# Patient Record
Sex: FEMALE | Race: BLACK OR AFRICAN AMERICAN | Employment: UNEMPLOYED | URBAN - METROPOLITAN AREA
[De-identification: names, ages, dates, MRNs, and addresses within clinical notes are randomized per-mention and may not be internally consistent; named-entity substitution may affect disease eponyms.]

---

## 2017-02-02 RX ORDER — TRAMADOL HYDROCHLORIDE 50 MG/1
50 TABLET ORAL EVERY 6 HOURS PRN
Qty: 30 TABLET | Refills: 0 | Status: SHIPPED | OUTPATIENT
Start: 2017-02-02 | End: 2017-03-29 | Stop reason: SDUPTHER

## 2017-02-24 RX ORDER — ATORVASTATIN CALCIUM 20 MG/1
TABLET, FILM COATED ORAL
Qty: 90 TABLET | Refills: 0 | Status: SHIPPED | OUTPATIENT
Start: 2017-02-24 | End: 2017-03-29 | Stop reason: SDUPTHER

## 2017-03-29 ENCOUNTER — OFFICE VISIT (OUTPATIENT)
Dept: PRIMARY CARE CLINIC | Age: 57
End: 2017-03-29
Payer: MEDICARE

## 2017-03-29 VITALS
DIASTOLIC BLOOD PRESSURE: 96 MMHG | RESPIRATION RATE: 16 BRPM | SYSTOLIC BLOOD PRESSURE: 122 MMHG | HEART RATE: 92 BPM | BODY MASS INDEX: 50.02 KG/M2 | HEIGHT: 64 IN | WEIGHT: 293 LBS

## 2017-03-29 DIAGNOSIS — E66.01 MORBID OBESITY WITH BMI OF 50.0-59.9, ADULT (HCC): ICD-10-CM

## 2017-03-29 DIAGNOSIS — I10 ESSENTIAL HYPERTENSION: Primary | ICD-10-CM

## 2017-03-29 DIAGNOSIS — Z86.73 HISTORY OF CVA (CEREBROVASCULAR ACCIDENT): ICD-10-CM

## 2017-03-29 PROCEDURE — G8419 CALC BMI OUT NRM PARAM NOF/U: HCPCS | Performed by: INTERNAL MEDICINE

## 2017-03-29 PROCEDURE — G8484 FLU IMMUNIZE NO ADMIN: HCPCS | Performed by: INTERNAL MEDICINE

## 2017-03-29 PROCEDURE — 1036F TOBACCO NON-USER: CPT | Performed by: INTERNAL MEDICINE

## 2017-03-29 PROCEDURE — G8427 DOCREV CUR MEDS BY ELIG CLIN: HCPCS | Performed by: INTERNAL MEDICINE

## 2017-03-29 PROCEDURE — 99213 OFFICE O/P EST LOW 20 MIN: CPT | Performed by: INTERNAL MEDICINE

## 2017-03-29 PROCEDURE — 3017F COLORECTAL CA SCREEN DOC REV: CPT | Performed by: INTERNAL MEDICINE

## 2017-03-29 PROCEDURE — 3014F SCREEN MAMMO DOC REV: CPT | Performed by: INTERNAL MEDICINE

## 2017-03-29 RX ORDER — AMLODIPINE BESYLATE 10 MG/1
TABLET ORAL
Qty: 90 TABLET | Refills: 3 | Status: SHIPPED | OUTPATIENT
Start: 2017-03-29 | End: 2018-03-14 | Stop reason: SDUPTHER

## 2017-03-29 RX ORDER — LORATADINE 10 MG/1
TABLET ORAL
Qty: 90 TABLET | Refills: 3 | Status: SHIPPED | OUTPATIENT
Start: 2017-03-29 | End: 2018-03-01 | Stop reason: SDUPTHER

## 2017-03-29 RX ORDER — ACETAMINOPHEN, ASPIRIN AND CAFFEINE 250; 250; 65 MG/1; MG/1; MG/1
1 TABLET, FILM COATED ORAL EVERY 6 HOURS PRN
Qty: 90 TABLET | Refills: 3 | Status: SHIPPED | OUTPATIENT
Start: 2017-03-29

## 2017-03-29 RX ORDER — ATORVASTATIN CALCIUM 20 MG/1
TABLET, FILM COATED ORAL
Qty: 90 TABLET | Refills: 0 | Status: SHIPPED | OUTPATIENT
Start: 2017-03-29 | End: 2017-07-13 | Stop reason: SDUPTHER

## 2017-03-29 RX ORDER — ASPIRIN AND DIPYRIDAMOLE 25; 200 MG/1; MG/1
CAPSULE, EXTENDED RELEASE ORAL
Qty: 180 CAPSULE | Refills: 3 | Status: SHIPPED | OUTPATIENT
Start: 2017-03-29 | End: 2018-06-07 | Stop reason: SDUPTHER

## 2017-03-29 RX ORDER — MELOXICAM 7.5 MG/1
TABLET ORAL
Qty: 90 TABLET | Refills: 3 | Status: SHIPPED | OUTPATIENT
Start: 2017-03-29 | End: 2018-03-14 | Stop reason: SDUPTHER

## 2017-03-29 RX ORDER — TRAMADOL HYDROCHLORIDE 50 MG/1
50 TABLET ORAL EVERY 6 HOURS PRN
Qty: 30 TABLET | Refills: 0 | Status: SHIPPED | OUTPATIENT
Start: 2017-03-29 | End: 2018-03-14 | Stop reason: SDUPTHER

## 2017-03-29 RX ORDER — HYDROCHLOROTHIAZIDE 25 MG/1
TABLET ORAL
Qty: 90 TABLET | Refills: 3 | Status: SHIPPED | OUTPATIENT
Start: 2017-03-29 | End: 2018-03-14 | Stop reason: SDUPTHER

## 2017-03-29 RX ORDER — HYDRALAZINE HYDROCHLORIDE 25 MG/1
TABLET, FILM COATED ORAL
Qty: 180 TABLET | Refills: 3 | Status: SHIPPED | OUTPATIENT
Start: 2017-03-29 | End: 2018-05-02 | Stop reason: SDUPTHER

## 2017-03-29 RX ORDER — LORATADINE 10 MG/1
10 CAPSULE, LIQUID FILLED ORAL DAILY
Qty: 90 CAPSULE | Refills: 3 | Status: SHIPPED | OUTPATIENT
Start: 2017-03-29 | End: 2018-04-20 | Stop reason: SDUPTHER

## 2017-03-29 RX ORDER — MECLIZINE HCL 12.5 MG/1
TABLET ORAL
Qty: 90 TABLET | Refills: 3 | Status: SHIPPED | OUTPATIENT
Start: 2017-03-29 | End: 2018-06-07 | Stop reason: SDUPTHER

## 2017-03-31 ENCOUNTER — CLINICAL DOCUMENTATION (OUTPATIENT)
Dept: PRIMARY CARE CLINIC | Age: 57
End: 2017-03-31

## 2017-04-03 ASSESSMENT — ENCOUNTER SYMPTOMS
SHORTNESS OF BREATH: 0
CHEST TIGHTNESS: 0
ABDOMINAL PAIN: 0

## 2017-07-13 RX ORDER — ATORVASTATIN CALCIUM 20 MG/1
TABLET, FILM COATED ORAL
Qty: 90 TABLET | Refills: 0 | Status: SHIPPED | OUTPATIENT
Start: 2017-07-13 | End: 2017-12-15 | Stop reason: SDUPTHER

## 2017-12-15 RX ORDER — ATORVASTATIN CALCIUM 20 MG/1
TABLET, FILM COATED ORAL
Qty: 90 TABLET | Refills: 0 | Status: SHIPPED | OUTPATIENT
Start: 2017-12-15 | End: 2018-03-14 | Stop reason: SDUPTHER

## 2018-03-01 ENCOUNTER — OFFICE VISIT (OUTPATIENT)
Dept: PRIMARY CARE CLINIC | Age: 58
End: 2018-03-01
Payer: MEDICARE

## 2018-03-01 VITALS
WEIGHT: 293 LBS | TEMPERATURE: 98.7 F | OXYGEN SATURATION: 98 % | BODY MASS INDEX: 50.02 KG/M2 | DIASTOLIC BLOOD PRESSURE: 84 MMHG | HEIGHT: 64 IN | SYSTOLIC BLOOD PRESSURE: 130 MMHG | HEART RATE: 106 BPM

## 2018-03-01 DIAGNOSIS — M17.10 ARTHRITIS OF KNEE: Primary | ICD-10-CM

## 2018-03-01 DIAGNOSIS — M17.0 PRIMARY OSTEOARTHRITIS OF BOTH KNEES: ICD-10-CM

## 2018-03-01 DIAGNOSIS — I10 ESSENTIAL HYPERTENSION: ICD-10-CM

## 2018-03-01 DIAGNOSIS — Z86.73 HISTORY OF CVA (CEREBROVASCULAR ACCIDENT): ICD-10-CM

## 2018-03-01 DIAGNOSIS — E66.01 MORBID OBESITY WITH BMI OF 50.0-59.9, ADULT (HCC): ICD-10-CM

## 2018-03-01 PROCEDURE — G8484 FLU IMMUNIZE NO ADMIN: HCPCS | Performed by: INTERNAL MEDICINE

## 2018-03-01 PROCEDURE — 1036F TOBACCO NON-USER: CPT | Performed by: INTERNAL MEDICINE

## 2018-03-01 PROCEDURE — 3014F SCREEN MAMMO DOC REV: CPT | Performed by: INTERNAL MEDICINE

## 2018-03-01 PROCEDURE — 99214 OFFICE O/P EST MOD 30 MIN: CPT | Performed by: INTERNAL MEDICINE

## 2018-03-01 PROCEDURE — G8427 DOCREV CUR MEDS BY ELIG CLIN: HCPCS | Performed by: INTERNAL MEDICINE

## 2018-03-01 PROCEDURE — G8417 CALC BMI ABV UP PARAM F/U: HCPCS | Performed by: INTERNAL MEDICINE

## 2018-03-01 PROCEDURE — 3017F COLORECTAL CA SCREEN DOC REV: CPT | Performed by: INTERNAL MEDICINE

## 2018-03-01 ASSESSMENT — PATIENT HEALTH QUESTIONNAIRE - PHQ9
1. LITTLE INTEREST OR PLEASURE IN DOING THINGS: 1
2. FEELING DOWN, DEPRESSED OR HOPELESS: 1
SUM OF ALL RESPONSES TO PHQ QUESTIONS 1-9: 2
SUM OF ALL RESPONSES TO PHQ9 QUESTIONS 1 & 2: 2

## 2018-03-01 NOTE — Clinical Note
Referral to pain management clinic at [facility] [place] [address] [facility] care [facility]. For bilateral knee injections. Patient's BMI is over 50 so you know.

## 2018-03-01 NOTE — PROGRESS NOTES
[address]  [address]  [place] [address]  Dept: [phone]  Dept Fax: [phone]    Dr. Lexie Villegas    Progress Note    Date of patient's visit: 3/1/2018  YOB: 1960  Patient's Name:  Juan Manuel Bundy    Patient Care Team:  Arsen Ervin MD as PCP - General (Internal Medicine)  Beatriz Westbrook DO as PCP - Internal Medicine (Internal Medicine)  Beatriz Westbrook DO as PCP - [facility] Attributed Provider    REASON FOR VISIT: Patient is here to discuss the following:  Hypertension, joint pains, knee pain, left foot pain. Stroke in 2006with weakness of right arm and right leg. Speech was not affected. medications, laboratory values and test results    HISTORY OF PRESENT ILLNESS:    History was obtained from the patient. Juan Manuel Bundy is a 61 y.o. is here for a      Patient Active Problem List   Diagnosis    Syncope    Osteoarthritis    History of CVA (cerebrovascular accident)    Essential hypertension       Subjective: Juan Manuel Bundy is a 61 y.o. female who presents with knee swelling involving and knee pain; both knees. Onset was gradual, starting about several months ago. Inciting event: this is a longstanding problem which has been getting worse. Current symptoms include: crepitus sensation, giving out, locking, stiffness and swelling. Pain is aggravated by any weight bearing, going up and down stairs, inactivity, lateral movements, pivoting, rising after sitting, standing and walking. Patient has had prior knee problems. Evaluation to date: plain films: abnormal with severe arthritic changes. . Treatment to date: avoidance of offending activity and OTC analgesics which are ineffective. Patient's medications, allergies, past medical, surgical, social and family histories were reviewed and updated as appropriate.                ALLERGIES      Allergies   Allergen Reactions    Ace Inhibitors     Pcn [Penicillins] voiced      understanding. 4.  Reviewed prior labs and health maintenance  5. Continue current medications, diet and exercise. Form  for [facility] non physician information needs filled out by office staff. Return in 4  Months with Lauro Michelle M.D., for Re-evaluation and further plan of treatment.      Ortega Conklin M.D.      3/1/2018, 9:59 AM

## 2018-03-05 ENCOUNTER — TELEPHONE (OUTPATIENT)
Dept: PAIN MANAGEMENT | Age: 58
End: 2018-03-05

## 2018-03-12 ENCOUNTER — TELEPHONE (OUTPATIENT)
Dept: PAIN MANAGEMENT | Age: 58
End: 2018-03-12

## 2018-03-14 DIAGNOSIS — M17.10 ARTHRITIS OF KNEE: Primary | ICD-10-CM

## 2018-03-14 RX ORDER — AMLODIPINE BESYLATE 10 MG/1
TABLET ORAL
Qty: 90 TABLET | Refills: 3 | Status: SHIPPED | OUTPATIENT
Start: 2018-03-14 | End: 2018-06-07 | Stop reason: SDUPTHER

## 2018-03-14 RX ORDER — MELOXICAM 7.5 MG/1
TABLET ORAL
Qty: 90 TABLET | Refills: 3 | Status: SHIPPED | OUTPATIENT
Start: 2018-03-14 | End: 2018-04-20 | Stop reason: SDUPTHER

## 2018-03-14 RX ORDER — TRAMADOL HYDROCHLORIDE 50 MG/1
50 TABLET ORAL EVERY 6 HOURS PRN
Qty: 30 TABLET | Refills: 0 | Status: SHIPPED | OUTPATIENT
Start: 2018-03-14 | End: 2018-04-13 | Stop reason: SDUPTHER

## 2018-03-14 RX ORDER — HYDROCHLOROTHIAZIDE 25 MG/1
TABLET ORAL
Qty: 90 TABLET | Refills: 3 | Status: SHIPPED | OUTPATIENT
Start: 2018-03-14 | End: 2018-04-20 | Stop reason: SDUPTHER

## 2018-03-14 RX ORDER — ATORVASTATIN CALCIUM 20 MG/1
TABLET, FILM COATED ORAL
Qty: 90 TABLET | Refills: 0 | Status: SHIPPED | OUTPATIENT
Start: 2018-03-14 | End: 2018-06-07 | Stop reason: SDUPTHER

## 2018-03-16 ENCOUNTER — TELEPHONE (OUTPATIENT)
Dept: PRIMARY CARE CLINIC | Age: 58
End: 2018-03-16

## 2018-03-20 ENCOUNTER — HOSPITAL ENCOUNTER (OUTPATIENT)
Dept: GENERAL RADIOLOGY | Age: 58
Discharge: HOME | End: 2018-03-22
Payer: MEDICARE

## 2018-03-20 ENCOUNTER — HOSPITAL ENCOUNTER (OUTPATIENT)
Dept: PAIN MANAGEMENT | Age: 58
Discharge: HOME | End: 2018-03-20
Payer: MEDICARE

## 2018-03-20 VITALS
DIASTOLIC BLOOD PRESSURE: 76 MMHG | BODY MASS INDEX: 50.02 KG/M2 | HEIGHT: 64 IN | SYSTOLIC BLOOD PRESSURE: 157 MMHG | HEART RATE: 88 BPM | TEMPERATURE: 98.2 F | RESPIRATION RATE: 16 BRPM | OXYGEN SATURATION: 98 % | WEIGHT: 293 LBS

## 2018-03-20 DIAGNOSIS — M17.0 PRIMARY OSTEOARTHRITIS OF BOTH KNEES: Primary | ICD-10-CM

## 2018-03-20 DIAGNOSIS — M17.0 PRIMARY OSTEOARTHRITIS OF BOTH KNEES: ICD-10-CM

## 2018-03-20 PROCEDURE — 77002 NEEDLE LOCALIZATION BY XRAY: CPT

## 2018-03-20 PROCEDURE — 3209999900 FLUORO FOR SURGICAL PROCEDURES

## 2018-03-20 PROCEDURE — 6360000002 HC RX W HCPCS

## 2018-03-20 PROCEDURE — 20610 DRAIN/INJ JOINT/BURSA W/O US: CPT | Performed by: PAIN MEDICINE

## 2018-03-20 PROCEDURE — 20610 DRAIN/INJ JOINT/BURSA W/O US: CPT

## 2018-03-20 ASSESSMENT — ACTIVITIES OF DAILY LIVING (ADL): EFFECT OF PAIN ON DAILY ACTIVITIES: PAIN INCREASES WITH STANDING

## 2018-03-20 ASSESSMENT — PAIN DESCRIPTION - ONSET: ONSET: ON-GOING

## 2018-03-20 ASSESSMENT — PAIN - FUNCTIONAL ASSESSMENT: PAIN_FUNCTIONAL_ASSESSMENT: 0-10

## 2018-03-20 ASSESSMENT — PAIN DESCRIPTION - PROGRESSION: CLINICAL_PROGRESSION: NOT CHANGED

## 2018-03-20 ASSESSMENT — PAIN DESCRIPTION - ORIENTATION: ORIENTATION: RIGHT;LEFT

## 2018-03-20 ASSESSMENT — PAIN DESCRIPTION - PAIN TYPE: TYPE: CHRONIC PAIN

## 2018-03-20 ASSESSMENT — PAIN SCALES - GENERAL: PAINLEVEL_OUTOF10: 5

## 2018-03-20 ASSESSMENT — PAIN DESCRIPTION - FREQUENCY: FREQUENCY: INTERMITTENT

## 2018-03-20 ASSESSMENT — PAIN DESCRIPTION - LOCATION: LOCATION: KNEE

## 2018-03-20 ASSESSMENT — PAIN DESCRIPTION - DESCRIPTORS: DESCRIPTORS: ACHING

## 2018-03-20 NOTE — PROGRESS NOTES
Discharge criteria met. Post procedure dressing dry and intact. Sensory and motor function intact as per pre-procedure. Patient alert and oriented x3  Instructions and follow up reviewed with pt at patient at discharge. Discharged home transported by wheelchair, accompanied by family .   Discharged @237

## 2018-04-04 ENCOUNTER — HOSPITAL ENCOUNTER (OUTPATIENT)
Dept: PAIN MANAGEMENT | Age: 58
Discharge: HOME | End: 2018-04-04
Payer: MEDICARE

## 2018-04-04 VITALS
OXYGEN SATURATION: 98 % | HEIGHT: 64 IN | TEMPERATURE: 98.6 F | SYSTOLIC BLOOD PRESSURE: 139 MMHG | HEART RATE: 98 BPM | BODY MASS INDEX: 50.02 KG/M2 | DIASTOLIC BLOOD PRESSURE: 85 MMHG | WEIGHT: 293 LBS | RESPIRATION RATE: 18 BRPM

## 2018-04-04 DIAGNOSIS — Z86.73 HISTORY OF CVA (CEREBROVASCULAR ACCIDENT): ICD-10-CM

## 2018-04-04 DIAGNOSIS — M17.0 PRIMARY OSTEOARTHRITIS OF BOTH KNEES: Primary | ICD-10-CM

## 2018-04-04 PROCEDURE — 99213 OFFICE O/P EST LOW 20 MIN: CPT

## 2018-04-04 PROCEDURE — 99214 OFFICE O/P EST MOD 30 MIN: CPT | Performed by: PAIN MEDICINE

## 2018-04-04 ASSESSMENT — ENCOUNTER SYMPTOMS
DIARRHEA: 0
WHEEZING: 0
ABDOMINAL PAIN: 0
DOUBLE VISION: 0
CONSTIPATION: 0
SPUTUM PRODUCTION: 0
BACK PAIN: 0
EYE REDNESS: 0
SINUS PAIN: 0
SHORTNESS OF BREATH: 0
NAUSEA: 1
SORE THROAT: 0
EYE PAIN: 0
PHOTOPHOBIA: 0
STRIDOR: 0
HEARTBURN: 0
EYE DISCHARGE: 0
COUGH: 0
BLURRED VISION: 0
VOMITING: 0
HEMOPTYSIS: 0
RESPIRATORY NEGATIVE: 1
BLOOD IN STOOL: 0
ORTHOPNEA: 0
EYES NEGATIVE: 1

## 2018-04-04 ASSESSMENT — PAIN DESCRIPTION - ORIENTATION: ORIENTATION: RIGHT;LEFT

## 2018-04-04 ASSESSMENT — PAIN DESCRIPTION - DESCRIPTORS: DESCRIPTORS: ACHING

## 2018-04-04 ASSESSMENT — PAIN DESCRIPTION - LOCATION: LOCATION: KNEE

## 2018-04-04 ASSESSMENT — PAIN DESCRIPTION - FREQUENCY: FREQUENCY: INTERMITTENT

## 2018-04-04 ASSESSMENT — PAIN DESCRIPTION - ONSET: ONSET: ON-GOING

## 2018-04-04 ASSESSMENT — PAIN SCALES - GENERAL: PAINLEVEL_OUTOF10: 7

## 2018-04-04 ASSESSMENT — PAIN DESCRIPTION - PAIN TYPE: TYPE: CHRONIC PAIN

## 2018-04-10 DIAGNOSIS — M51.36 DDD (DEGENERATIVE DISC DISEASE), LUMBAR: Primary | ICD-10-CM

## 2018-04-10 NOTE — TELEPHONE ENCOUNTER
Handicapped placard done again. It is there that it is for five years.      Electronically signed by Simone Hodgkin, MD on 4/10/2018 at 4:35 PM

## 2018-04-13 DIAGNOSIS — M17.10 ARTHRITIS OF KNEE: ICD-10-CM

## 2018-04-13 RX ORDER — TRAMADOL HYDROCHLORIDE 50 MG/1
50 TABLET ORAL EVERY 6 HOURS PRN
Qty: 30 TABLET | Refills: 0 | Status: SHIPPED | OUTPATIENT
Start: 2018-04-13 | End: 2018-04-24 | Stop reason: SDUPTHER

## 2018-04-16 DIAGNOSIS — M17.10 ARTHRITIS OF KNEE: ICD-10-CM

## 2018-04-16 RX ORDER — TRAMADOL HYDROCHLORIDE 50 MG/1
TABLET ORAL
Qty: 30 TABLET | Refills: 0 | OUTPATIENT
Start: 2018-04-16

## 2018-04-17 ENCOUNTER — HOSPITAL ENCOUNTER (OUTPATIENT)
Dept: PAIN MANAGEMENT | Age: 58
Discharge: HOME | End: 2018-04-17
Payer: MEDICARE

## 2018-04-17 ENCOUNTER — HOSPITAL ENCOUNTER (OUTPATIENT)
Dept: GENERAL RADIOLOGY | Age: 58
Discharge: HOME | End: 2018-04-19
Payer: MEDICARE

## 2018-04-17 VITALS
DIASTOLIC BLOOD PRESSURE: 90 MMHG | TEMPERATURE: 98.6 F | HEART RATE: 99 BPM | OXYGEN SATURATION: 97 % | WEIGHT: 293 LBS | SYSTOLIC BLOOD PRESSURE: 160 MMHG | BODY MASS INDEX: 50.02 KG/M2 | RESPIRATION RATE: 16 BRPM | HEIGHT: 64 IN

## 2018-04-17 DIAGNOSIS — M17.0 PRIMARY OSTEOARTHRITIS OF BOTH KNEES: ICD-10-CM

## 2018-04-17 PROCEDURE — 2500000003 HC RX 250 WO HCPCS

## 2018-04-17 PROCEDURE — 20610 DRAIN/INJ JOINT/BURSA W/O US: CPT

## 2018-04-17 PROCEDURE — 77002 NEEDLE LOCALIZATION BY XRAY: CPT

## 2018-04-17 PROCEDURE — 20610 DRAIN/INJ JOINT/BURSA W/O US: CPT | Performed by: ANESTHESIOLOGY

## 2018-04-17 PROCEDURE — 3209999900 FLUORO FOR SURGICAL PROCEDURES

## 2018-04-17 PROCEDURE — 6360000002 HC RX W HCPCS

## 2018-04-17 ASSESSMENT — PAIN DESCRIPTION - ONSET: ONSET: ON-GOING

## 2018-04-17 ASSESSMENT — PAIN DESCRIPTION - LOCATION: LOCATION: KNEE

## 2018-04-17 ASSESSMENT — PAIN DESCRIPTION - FREQUENCY: FREQUENCY: INTERMITTENT

## 2018-04-17 ASSESSMENT — PAIN DESCRIPTION - DESCRIPTORS: DESCRIPTORS: ACHING

## 2018-04-17 ASSESSMENT — PAIN SCALES - GENERAL: PAINLEVEL_OUTOF10: 7

## 2018-04-17 ASSESSMENT — PAIN - FUNCTIONAL ASSESSMENT: PAIN_FUNCTIONAL_ASSESSMENT: 0-10

## 2018-04-17 ASSESSMENT — PAIN DESCRIPTION - PAIN TYPE: TYPE: CHRONIC PAIN

## 2018-04-17 ASSESSMENT — PAIN DESCRIPTION - ORIENTATION: ORIENTATION: RIGHT;LEFT

## 2018-04-23 RX ORDER — LORATADINE 10 MG/1
10 CAPSULE, LIQUID FILLED ORAL DAILY
Qty: 90 CAPSULE | Refills: 3 | Status: SHIPPED | OUTPATIENT
Start: 2018-04-23 | End: 2018-06-07 | Stop reason: SDUPTHER

## 2018-04-23 RX ORDER — HYDROCHLOROTHIAZIDE 25 MG/1
TABLET ORAL
Qty: 90 TABLET | Refills: 3 | Status: SHIPPED | OUTPATIENT
Start: 2018-04-23 | End: 2018-06-07 | Stop reason: SDUPTHER

## 2018-04-23 RX ORDER — MELOXICAM 7.5 MG/1
TABLET ORAL
Qty: 90 TABLET | Refills: 3 | Status: SHIPPED | OUTPATIENT
Start: 2018-04-23 | End: 2018-06-07 | Stop reason: SDUPTHER

## 2018-04-24 RX ORDER — TRAMADOL HYDROCHLORIDE 50 MG/1
50 TABLET ORAL 3 TIMES DAILY PRN
Qty: 90 TABLET | Refills: 0 | Status: SHIPPED | OUTPATIENT
Start: 2018-04-24 | End: 2018-06-07 | Stop reason: SDUPTHER

## 2018-05-02 RX ORDER — HYDRALAZINE HYDROCHLORIDE 25 MG/1
TABLET, FILM COATED ORAL
Qty: 180 TABLET | Refills: 3 | Status: SHIPPED | OUTPATIENT
Start: 2018-05-02 | End: 2018-06-07 | Stop reason: SDUPTHER

## 2018-06-07 ENCOUNTER — OFFICE VISIT (OUTPATIENT)
Dept: PRIMARY CARE CLINIC | Age: 58
End: 2018-06-07
Payer: MEDICARE

## 2018-06-07 VITALS
WEIGHT: 293 LBS | OXYGEN SATURATION: 96 % | BODY MASS INDEX: 50.02 KG/M2 | DIASTOLIC BLOOD PRESSURE: 83 MMHG | HEIGHT: 64 IN | SYSTOLIC BLOOD PRESSURE: 137 MMHG | HEART RATE: 100 BPM

## 2018-06-07 DIAGNOSIS — Z86.73 HISTORY OF CVA (CEREBROVASCULAR ACCIDENT): ICD-10-CM

## 2018-06-07 DIAGNOSIS — J30.9 CHRONIC ALLERGIC RHINITIS, UNSPECIFIED SEASONALITY, UNSPECIFIED TRIGGER: ICD-10-CM

## 2018-06-07 DIAGNOSIS — R42 VERTIGO: ICD-10-CM

## 2018-06-07 DIAGNOSIS — Z12.39 SCREENING FOR BREAST CANCER: ICD-10-CM

## 2018-06-07 DIAGNOSIS — I10 ESSENTIAL HYPERTENSION: ICD-10-CM

## 2018-06-07 DIAGNOSIS — M17.10 ARTHRITIS OF KNEE: Primary | ICD-10-CM

## 2018-06-07 DIAGNOSIS — Z00.00 HEALTHCARE MAINTENANCE: ICD-10-CM

## 2018-06-07 PROCEDURE — 1036F TOBACCO NON-USER: CPT | Performed by: NURSE PRACTITIONER

## 2018-06-07 PROCEDURE — G8417 CALC BMI ABV UP PARAM F/U: HCPCS | Performed by: NURSE PRACTITIONER

## 2018-06-07 PROCEDURE — 99214 OFFICE O/P EST MOD 30 MIN: CPT | Performed by: NURSE PRACTITIONER

## 2018-06-07 PROCEDURE — G8427 DOCREV CUR MEDS BY ELIG CLIN: HCPCS | Performed by: NURSE PRACTITIONER

## 2018-06-07 PROCEDURE — 3017F COLORECTAL CA SCREEN DOC REV: CPT | Performed by: NURSE PRACTITIONER

## 2018-06-07 RX ORDER — HYDRALAZINE HYDROCHLORIDE 25 MG/1
TABLET, FILM COATED ORAL
Qty: 180 TABLET | Refills: 1 | Status: SHIPPED | OUTPATIENT
Start: 2018-06-07 | End: 2019-07-18 | Stop reason: SDUPTHER

## 2018-06-07 RX ORDER — ATORVASTATIN CALCIUM 20 MG/1
TABLET, FILM COATED ORAL
Qty: 90 TABLET | Refills: 1 | Status: SHIPPED | OUTPATIENT
Start: 2018-06-07 | End: 2018-12-07 | Stop reason: SDUPTHER

## 2018-06-07 RX ORDER — MELOXICAM 7.5 MG/1
TABLET ORAL
Qty: 90 TABLET | Refills: 1 | Status: SHIPPED | OUTPATIENT
Start: 2018-06-07 | End: 2018-12-09 | Stop reason: SDUPTHER

## 2018-06-07 RX ORDER — MECLIZINE HCL 12.5 MG/1
TABLET ORAL
Qty: 90 TABLET | Refills: 1 | Status: SHIPPED | OUTPATIENT
Start: 2018-06-07 | End: 2019-07-18 | Stop reason: SDUPTHER

## 2018-06-07 RX ORDER — TRAMADOL HYDROCHLORIDE 50 MG/1
50 TABLET ORAL 3 TIMES DAILY PRN
Qty: 90 TABLET | Refills: 0 | Status: SHIPPED | OUTPATIENT
Start: 2018-06-07 | End: 2018-08-22 | Stop reason: SDUPTHER

## 2018-06-07 RX ORDER — HYDROCHLOROTHIAZIDE 25 MG/1
TABLET ORAL
Qty: 90 TABLET | Refills: 3 | Status: SHIPPED | OUTPATIENT
Start: 2018-06-07 | End: 2019-07-18 | Stop reason: SDUPTHER

## 2018-06-07 RX ORDER — ASPIRIN AND DIPYRIDAMOLE 25; 200 MG/1; MG/1
CAPSULE, EXTENDED RELEASE ORAL
Qty: 180 CAPSULE | Refills: 1 | Status: SHIPPED | OUTPATIENT
Start: 2018-06-07 | End: 2018-12-07 | Stop reason: SDUPTHER

## 2018-06-07 RX ORDER — AMLODIPINE BESYLATE 10 MG/1
TABLET ORAL
Qty: 90 TABLET | Refills: 1 | Status: SHIPPED | OUTPATIENT
Start: 2018-06-07 | End: 2019-01-19 | Stop reason: SDUPTHER

## 2018-06-07 RX ORDER — LORATADINE 10 MG/1
10 CAPSULE, LIQUID FILLED ORAL DAILY
Qty: 90 CAPSULE | Refills: 1 | Status: SHIPPED | OUTPATIENT
Start: 2018-06-07 | End: 2019-07-18 | Stop reason: SDUPTHER

## 2018-06-07 ASSESSMENT — ENCOUNTER SYMPTOMS
CONSTIPATION: 0
VOMITING: 0
COUGH: 0
CHEST TIGHTNESS: 0
NAUSEA: 0
DIARRHEA: 0
SHORTNESS OF BREATH: 0

## 2018-08-22 DIAGNOSIS — M17.10 ARTHRITIS OF KNEE: ICD-10-CM

## 2018-08-22 RX ORDER — TRAMADOL HYDROCHLORIDE 50 MG/1
50 TABLET ORAL 3 TIMES DAILY PRN
Qty: 90 TABLET | Refills: 0 | Status: SHIPPED | OUTPATIENT
Start: 2018-08-22 | End: 2018-12-09 | Stop reason: SDUPTHER

## 2018-12-07 DIAGNOSIS — Z86.73 HISTORY OF CVA (CEREBROVASCULAR ACCIDENT): ICD-10-CM

## 2018-12-07 RX ORDER — ASPIRIN/DIPYRIDAMOLE 25MG-200MG
CAPSULE,EXTENDED RELEASE MULTIPHASE 12HR ORAL
Qty: 180 CAPSULE | Refills: 1 | Status: SHIPPED | OUTPATIENT
Start: 2018-12-07 | End: 2019-07-18 | Stop reason: SDUPTHER

## 2018-12-07 RX ORDER — ATORVASTATIN CALCIUM 20 MG/1
TABLET, FILM COATED ORAL
Qty: 90 TABLET | Refills: 1 | Status: SHIPPED | OUTPATIENT
Start: 2018-12-07 | End: 2019-06-06 | Stop reason: SDUPTHER

## 2018-12-09 DIAGNOSIS — M17.10 ARTHRITIS OF KNEE: ICD-10-CM

## 2018-12-10 RX ORDER — MELOXICAM 7.5 MG/1
TABLET ORAL
Qty: 90 TABLET | Refills: 0 | Status: SHIPPED | OUTPATIENT
Start: 2018-12-10 | End: 2019-07-18 | Stop reason: SDUPTHER

## 2018-12-10 RX ORDER — TRAMADOL HYDROCHLORIDE 50 MG/1
50 TABLET ORAL 3 TIMES DAILY PRN
Qty: 90 TABLET | Refills: 0 | Status: SHIPPED | OUTPATIENT
Start: 2018-12-10 | End: 2019-07-18 | Stop reason: SDUPTHER

## 2019-01-19 DIAGNOSIS — I10 ESSENTIAL HYPERTENSION: ICD-10-CM

## 2019-01-21 RX ORDER — AMLODIPINE BESYLATE 10 MG/1
TABLET ORAL
Qty: 90 TABLET | Refills: 1 | Status: SHIPPED | OUTPATIENT
Start: 2019-01-21 | End: 2019-07-18 | Stop reason: SDUPTHER

## 2019-06-06 DIAGNOSIS — Z86.73 HISTORY OF CVA (CEREBROVASCULAR ACCIDENT): ICD-10-CM

## 2019-06-06 RX ORDER — ATORVASTATIN CALCIUM 20 MG/1
TABLET, FILM COATED ORAL
Qty: 30 TABLET | Refills: 0 | Status: SHIPPED | OUTPATIENT
Start: 2019-06-06 | End: 2019-07-07 | Stop reason: SDUPTHER

## 2019-06-06 NOTE — TELEPHONE ENCOUNTER
Last OV 06/07/2018    Health Maintenance   Topic Date Due    Creatinine monitoring  1960    DTaP/Tdap/Td vaccine (1 - Tdap) 09/02/1978    Diabetes screen  09/02/1999    Shingles Vaccine (1 of 2) 09/02/2009    Breast cancer screen  09/01/2015    Potassium monitoring  03/24/2016    Cervical cancer screen  09/01/2017    Flu vaccine (Season Ended) 09/01/2019    Lipid screen  06/06/2021    Colon cancer screen colonoscopy  09/01/2022    Hepatitis C screen  Addressed    HIV screen  Addressed    Pneumococcal 0-64 years Vaccine  Aged Out             (applicable per patient's age: Cancer Screenings, Depression Screening, Fall Risk Screening, Immunizations)    LDL Cholesterol (mg/dL)   Date Value   06/06/2016 72     AST (U/L)   Date Value   03/24/2015 15     ALT (U/L)   Date Value   03/24/2015 9     BUN (mg/dL)   Date Value   03/24/2015 18      (goal A1C is < 7)   (goal LDL is <100) need 30-50% reduction from baseline     BP Readings from Last 3 Encounters:   06/07/18 137/83   04/17/18 (!) 160/90   04/04/18 139/85    (goal /80)      All Future Testing planned in [facility]:  Lab Frequency Next Occurrence   HEMA DIGITAL SCREEN W OR WO CAD BILATERAL Once 06/07/2018       Next Visit Date:  No future appointments.          Patient Active Problem List:     Syncope     Osteoarthritis     History of CVA (cerebrovascular accident)     Essential hypertension     Vertigo     Chronic allergic rhinitis

## 2019-07-07 DIAGNOSIS — Z86.73 HISTORY OF CVA (CEREBROVASCULAR ACCIDENT): ICD-10-CM

## 2019-07-08 RX ORDER — ATORVASTATIN CALCIUM 20 MG/1
TABLET, FILM COATED ORAL
Qty: 30 TABLET | Refills: 0 | Status: SHIPPED | OUTPATIENT
Start: 2019-07-08 | End: 2019-07-18 | Stop reason: SDUPTHER

## 2019-07-18 ENCOUNTER — OFFICE VISIT (OUTPATIENT)
Dept: PRIMARY CARE CLINIC | Age: 59
End: 2019-07-18
Payer: MEDICARE

## 2019-07-18 VITALS
HEART RATE: 100 BPM | DIASTOLIC BLOOD PRESSURE: 72 MMHG | RESPIRATION RATE: 18 BRPM | BODY MASS INDEX: 53.92 KG/M2 | WEIGHT: 293 LBS | SYSTOLIC BLOOD PRESSURE: 118 MMHG | HEIGHT: 62 IN

## 2019-07-18 DIAGNOSIS — J30.9 CHRONIC ALLERGIC RHINITIS: ICD-10-CM

## 2019-07-18 DIAGNOSIS — R42 VERTIGO: ICD-10-CM

## 2019-07-18 DIAGNOSIS — E66.01 MORBID OBESITY WITH BMI OF 50.0-59.9, ADULT (HCC): ICD-10-CM

## 2019-07-18 DIAGNOSIS — E78.2 MIXED HYPERLIPIDEMIA: ICD-10-CM

## 2019-07-18 DIAGNOSIS — Z13.29 SCREENING FOR THYROID DISORDER: ICD-10-CM

## 2019-07-18 DIAGNOSIS — M17.0 PRIMARY OSTEOARTHRITIS OF BOTH KNEES: Primary | ICD-10-CM

## 2019-07-18 DIAGNOSIS — I10 ESSENTIAL HYPERTENSION: ICD-10-CM

## 2019-07-18 DIAGNOSIS — Z86.73 HISTORY OF CVA (CEREBROVASCULAR ACCIDENT): ICD-10-CM

## 2019-07-18 DIAGNOSIS — Z12.31 ENCOUNTER FOR SCREENING MAMMOGRAM FOR BREAST CANCER: ICD-10-CM

## 2019-07-18 DIAGNOSIS — Z13.0 SCREENING FOR OTHER AND UNSPECIFIED DEFICIENCY ANEMIA: ICD-10-CM

## 2019-07-18 PROCEDURE — 99214 OFFICE O/P EST MOD 30 MIN: CPT | Performed by: NURSE PRACTITIONER

## 2019-07-18 PROCEDURE — 3017F COLORECTAL CA SCREEN DOC REV: CPT | Performed by: NURSE PRACTITIONER

## 2019-07-18 PROCEDURE — 1036F TOBACCO NON-USER: CPT | Performed by: NURSE PRACTITIONER

## 2019-07-18 PROCEDURE — G8417 CALC BMI ABV UP PARAM F/U: HCPCS | Performed by: NURSE PRACTITIONER

## 2019-07-18 PROCEDURE — G8427 DOCREV CUR MEDS BY ELIG CLIN: HCPCS | Performed by: NURSE PRACTITIONER

## 2019-07-18 RX ORDER — ASPIRIN AND DIPYRIDAMOLE 25; 200 MG/1; MG/1
CAPSULE, EXTENDED RELEASE ORAL
Qty: 180 CAPSULE | Refills: 1 | Status: SHIPPED | OUTPATIENT
Start: 2019-07-18 | End: 2020-04-20

## 2019-07-18 RX ORDER — ATORVASTATIN CALCIUM 20 MG/1
TABLET, FILM COATED ORAL
Qty: 90 TABLET | Refills: 1 | Status: SHIPPED | OUTPATIENT
Start: 2019-07-18 | End: 2020-08-10 | Stop reason: SDUPTHER

## 2019-07-18 RX ORDER — HYDROCHLOROTHIAZIDE 25 MG/1
TABLET ORAL
Qty: 90 TABLET | Refills: 1 | Status: SHIPPED | OUTPATIENT
Start: 2019-07-18 | End: 2020-01-14

## 2019-07-18 RX ORDER — MECLIZINE HCL 12.5 MG/1
TABLET ORAL
Qty: 90 TABLET | Refills: 1 | Status: SHIPPED | OUTPATIENT
Start: 2019-07-18 | End: 2019-09-13 | Stop reason: SDUPTHER

## 2019-07-18 RX ORDER — LORATADINE 10 MG/1
10 CAPSULE, LIQUID FILLED ORAL DAILY
Qty: 90 CAPSULE | Refills: 1 | Status: SHIPPED | OUTPATIENT
Start: 2019-07-18 | End: 2020-08-16 | Stop reason: SDUPTHER

## 2019-07-18 RX ORDER — TRAMADOL HYDROCHLORIDE 50 MG/1
50 TABLET ORAL 3 TIMES DAILY PRN
Qty: 90 TABLET | Refills: 0 | Status: SHIPPED | OUTPATIENT
Start: 2019-07-18 | End: 2020-08-10 | Stop reason: SDUPTHER

## 2019-07-18 RX ORDER — HYDRALAZINE HYDROCHLORIDE 25 MG/1
TABLET, FILM COATED ORAL
Qty: 180 TABLET | Refills: 1 | Status: SHIPPED | OUTPATIENT
Start: 2019-07-18 | End: 2020-01-14

## 2019-07-18 RX ORDER — AMLODIPINE BESYLATE 10 MG/1
TABLET ORAL
Qty: 90 TABLET | Refills: 1 | Status: SHIPPED | OUTPATIENT
Start: 2019-07-18 | End: 2020-01-14

## 2019-07-18 RX ORDER — MELOXICAM 15 MG/1
TABLET ORAL
Qty: 90 TABLET | Refills: 3 | Status: SHIPPED | OUTPATIENT
Start: 2019-07-18 | End: 2020-08-10 | Stop reason: SDUPTHER

## 2019-07-18 ASSESSMENT — ENCOUNTER SYMPTOMS
TROUBLE SWALLOWING: 0
ABDOMINAL PAIN: 0
COUGH: 0
NAUSEA: 0
WHEEZING: 0
VOMITING: 0
SINUS PRESSURE: 0
BLOOD IN STOOL: 0
DIARRHEA: 0
SHORTNESS OF BREATH: 0
CONSTIPATION: 0
SORE THROAT: 0

## 2019-07-18 ASSESSMENT — PATIENT HEALTH QUESTIONNAIRE - PHQ9
2. FEELING DOWN, DEPRESSED OR HOPELESS: 0
1. LITTLE INTEREST OR PLEASURE IN DOING THINGS: 0
SUM OF ALL RESPONSES TO PHQ QUESTIONS 1-9: 0
SUM OF ALL RESPONSES TO PHQ QUESTIONS 1-9: 0
SUM OF ALL RESPONSES TO PHQ9 QUESTIONS 1 & 2: 0

## 2019-07-22 ENCOUNTER — TELEPHONE (OUTPATIENT)
Dept: PRIMARY CARE CLINIC | Age: 59
End: 2019-07-22

## 2019-07-22 NOTE — LETTER
[facility] [address]   [address] [facility] [address]  P: [phone] F: [phone]      07/22/19      Kaye Sexton  [facility] [address]      Our records are showing that you are due for your Colon Cancer Screening. We now have more options available for you to complete this requirement. A colonoscopy is the most effective screening method. Your primary care physician will   place an order for a referral to a Gastroenterologist of your choice. You will need to call   their office to schedule your colonoscopy. They will forward the results to our office, and   we will call you with the results. If any further treatment is needed, we will explain at   that point. Colonoscopies are repeated every 10 years. Another option is a Cologuard test. To complete this your primary care physician will   place an order, that we will then forward to Cologuard. Their staff will contact you to   set everything up. They will ship their kit to you and you can complete the test in the   comfort of your own home. All of the necessary supplies and directions will be included  in your kit. You will then mail the kit back to them, with the included return label. They   will forward the results back to our office, and we will call you with the results. If any   further treatment is needed, we will explain at that time. Cologuard tests are repeated every 3 years. Please call our office with your choice of the screening, [phone].       Sincerely,    DEMARCUS Hanley - CNP

## 2019-07-24 ENCOUNTER — TELEPHONE (OUTPATIENT)
Dept: PRIMARY CARE CLINIC | Age: 59
End: 2019-07-24

## 2020-01-14 RX ORDER — AMLODIPINE BESYLATE 10 MG/1
TABLET ORAL
Qty: 90 TABLET | Refills: 1 | Status: SHIPPED | OUTPATIENT
Start: 2020-01-14 | End: 2020-08-10 | Stop reason: SDUPTHER

## 2020-01-14 RX ORDER — HYDROCHLOROTHIAZIDE 25 MG/1
TABLET ORAL
Qty: 90 TABLET | Refills: 1 | Status: SHIPPED | OUTPATIENT
Start: 2020-01-14 | End: 2020-08-10 | Stop reason: SDUPTHER

## 2020-01-14 RX ORDER — HYDRALAZINE HYDROCHLORIDE 25 MG/1
TABLET, FILM COATED ORAL
Qty: 180 TABLET | Refills: 1 | Status: SHIPPED | OUTPATIENT
Start: 2020-01-14 | End: 2020-08-16 | Stop reason: SDUPTHER

## 2020-04-20 RX ORDER — ASPIRIN AND DIPYRIDAMOLE 25; 200 MG/1; MG/1
CAPSULE, EXTENDED RELEASE ORAL
Qty: 180 CAPSULE | Refills: 1 | Status: SHIPPED | OUTPATIENT
Start: 2020-04-20 | End: 2020-10-19

## 2020-08-14 ENCOUNTER — TELEMEDICINE (OUTPATIENT)
Dept: PRIMARY CARE CLINIC | Age: 60
End: 2020-08-14
Payer: MEDICARE

## 2020-08-14 ENCOUNTER — TELEPHONE (OUTPATIENT)
Dept: PRIMARY CARE CLINIC | Age: 60
End: 2020-08-14

## 2020-08-14 PROCEDURE — 3017F COLORECTAL CA SCREEN DOC REV: CPT | Performed by: NURSE PRACTITIONER

## 2020-08-14 PROCEDURE — G0439 PPPS, SUBSEQ VISIT: HCPCS | Performed by: NURSE PRACTITIONER

## 2020-08-14 NOTE — PATIENT INSTRUCTIONS
Personalized Preventive Plan for Cirilo Bauer - 8/14/2020  Medicare offers a range of preventive health benefits. Some of the tests and screenings are paid in full while other may be subject to a deductible, co-insurance, and/or copay. Some of these benefits include a comprehensive review of your medical history including lifestyle, illnesses that may run in your family, and various assessments and screenings as appropriate. After reviewing your medical record and screening and assessments performed today your provider may have ordered immunizations, labs, imaging, and/or referrals for you. A list of these orders (if applicable) as well as your Preventive Care list are included within your After Visit Summary for your review. Other Preventive Recommendations:    · A preventive eye exam performed by an eye specialist is recommended every 1-2 years to screen for glaucoma; cataracts, macular degeneration, and other eye disorders. · A preventive dental visit is recommended every 6 months. · Try to get at least 150 minutes of exercise per week or 10,000 steps per day on a pedometer . · Order or download the FREE \"Exercise & Physical Activity: Your Everyday Guide\" from [employer]. Call 7-723.925.8470 or search [employer] online. · You need 1653-0397 mg of calcium and 5605-1816 IU of vitamin D per day. It is possible to meet your calcium requirement with diet alone, but a vitamin D supplement is usually necessary to meet this goal.  · When exposed to the sun, use a sunscreen that protects against both UVA and UVB radiation with an SPF of 30 or greater. Reapply every 2 to 3 hours or after sweating, drying off with a towel, or swimming. · Always wear a seat belt when traveling in a car. Always wear a helmet when riding a bicycle or motorcycle.

## 2020-08-14 NOTE — PROGRESS NOTES
Medicare Annual Wellness Visit  Are Name: Ramiro Donaldson Date: 2020   MRN: [id number] Sex: Female   Age: 60 y.o. Ethnicity: Non-/Non    : 1960 Race: Inocencia Boss is here for Medicare AWV    Screenings for behavioral, psychosocial and functional/safety risks, and cognitive dysfunction are all negative except as indicated below. These results, as well as other patient data from the [address] form, are documented in Flowsheets linked to this Encounter. Allergies   Allergen Reactions    Latex     Ace Inhibitors     Dye [Iodides]      rash    Pcn [Penicillins]          Prior to Visit Medications    Medication Sig Taking? Authorizing Provider   meloxicam (MOBIC) 15 MG tablet TAKE 1 TABLET BY MOUTH EVERY DAY  DEMARCUS Ortega CNP   amLODIPine (NORVASC) 10 MG tablet Take 1 tablet by mouth daily  DEMARCUS Su CNP   hydroCHLOROthiazide (HYDRODIURIL) 25 MG tablet TAKE 1 TABLET BY MOUTH EVERY DAY  DEMARCUS Su CNP   traMADol (ULTRAM) 50 MG tablet Take 1 tablet by mouth 3 times daily as needed for Pain for up to 220 days.   DEMARCUS Johnson CNP   atorvastatin (LIPITOR) 20 MG tablet TAKE 1 TABLET BY MOUTH EVERY DAY  DEMARCUS Ortega CNP   aspirin-dipyridamole (AGGRENOX)  MG per extended release capsule TAKE 1 CAPSULE BY MOUTH TWICE A DAY  DEMARCUS Su CNP   hydrALAZINE (APRESOLINE) 25 MG tablet TAKE 1 TABLET BY MOUTH TWICE A DAY  DEMARCUS Ortega CNP   meclizine (ANTIVERT) 12.5 MG tablet TAKE 1 TABLET BY MOUTH THREE TIMES A DAY AS NEEDED  DEMARCUS Su CNP   loratadine (CLARITIN) 10 MG capsule Take 1 capsule by mouth daily  DEMARCUS Johnson CNP   aspirin-acetaminophen-caffeine (EXCEDRIN MIGRAINE) [phone] MG per tablet Take 1 tablet by mouth every 6 hours as needed for Pain  Britt POLANCO Blood, DO         Past Medical History:   Diagnosis Date    CVA (cerebral vascular 09/02/1978    Shingles Vaccine (1 of 2) 09/02/2009    Breast cancer screen  09/01/2015    Potassium monitoring  03/24/2016    Lipid screen  06/06/2017    Cervical cancer screen  09/01/2017    Annual Wellness Visit (AWV)  06/19/2019    Flu vaccine (1) 09/01/2020    Colon cancer screen colonoscopy  09/01/2022    Hepatitis C screen  Addressed    HIV screen  Addressed    Hepatitis A vaccine  Aged Out    Hepatitis B vaccine  Aged Out    Hib vaccine  Aged Out    Meningococcal (ACWY) vaccine  Aged Out    Pneumococcal 0-64 years Vaccine  Aged Out     Recommendations for [facility] Due: see orders and patient instructions/AVS.  . Recommended screening schedule for the next 5-10 years is provided to the patient in written form: see Patient Ayush Grimaldo was seen today for medicare awv. Diagnoses and all orders for this visit:    Routine general medical examination at a health care facility    Essential hypertension  -     Comprehensive Metabolic Panel; Future    Morbid obesity with BMI of 50.0-59.9, adult (HCC)    Mixed hyperlipidemia  -     Lipid Panel; Future    Screening, anemia, deficiency, iron  -     CBC Auto Differential; Future      No new concerns, will check labs as has not completed on over 1 year. Continue current meds for nowOfelia Roca is a 60 y.o. female being evaluated by a Virtual Visit (phone) encounter to address concerns as mentioned above. A caregiver was present when appropriate. Due to this being a TeleHealth encounter (During [place] [address] public health emergency), evaluation of the following organ systems was limited: Vitals/Constitutional/EENT/Resp/CV/GI//MS/Neuro/Skin/Heme-Lymph-Imm.   Pursuant to the emergency declaration under the [facility] [address] [place] [address] waiver authority and the [employer] and [employer] Act, this Virtual Visit was conducted with patient's (and/or legal guardian's) consent, to reduce the patient's risk of exposure to COVID-19 and provide necessary medical care. The patient (and/or legal guardian) has also been advised to contact this office for worsening conditions or problems, and seek emergency medical treatment and/or call 911 if deemed necessary. Patient identification was verified at the start of the visit: Yes    Services were provided through phone to substitute for in-person clinic visit. Patient and provider were located at their individual homes. --DEMARCUS Vargas CNP on 8/14/2020 at 11:08 AM    An electronic signature was used to authenticate this note.

## 2020-08-16 RX ORDER — AMLODIPINE BESYLATE 10 MG/1
TABLET ORAL
Qty: 90 TABLET | Refills: 1 | Status: SHIPPED | OUTPATIENT
Start: 2020-08-16 | End: 2021-02-05

## 2020-08-16 RX ORDER — MELOXICAM 15 MG/1
TABLET ORAL
Qty: 90 TABLET | Refills: 3 | Status: SHIPPED | OUTPATIENT
Start: 2020-08-16 | End: 2021-08-23

## 2020-08-16 RX ORDER — HYDROCHLOROTHIAZIDE 25 MG/1
TABLET ORAL
Qty: 90 TABLET | Refills: 1 | Status: SHIPPED | OUTPATIENT
Start: 2020-08-16 | End: 2021-02-05

## 2020-08-16 RX ORDER — HYDRALAZINE HYDROCHLORIDE 25 MG/1
TABLET, FILM COATED ORAL
Qty: 180 TABLET | Refills: 1 | Status: SHIPPED | OUTPATIENT
Start: 2020-08-16 | End: 2021-02-05

## 2020-08-16 RX ORDER — LORATADINE 10 MG/1
TABLET ORAL
Qty: 90 TABLET | Refills: 1 | Status: SHIPPED | OUTPATIENT
Start: 2020-08-16 | End: 2021-11-08

## 2020-10-19 RX ORDER — ASPIRIN AND DIPYRIDAMOLE 25; 200 MG/1; MG/1
CAPSULE, EXTENDED RELEASE ORAL
Qty: 180 CAPSULE | Refills: 1 | Status: SHIPPED | OUTPATIENT
Start: 2020-10-19 | End: 2021-05-11

## 2020-10-19 NOTE — TELEPHONE ENCOUNTER
Health Maintenance   Topic Date Due    Creatinine monitoring  1960    DTaP/Tdap/Td vaccine (1 - Tdap) 09/02/1978    Shingles Vaccine (1 of 2) 09/02/2009    Breast cancer screen  09/01/2015    Potassium monitoring  03/24/2016    Lipid screen  06/06/2017    Cervical cancer screen  09/01/2017    Flu vaccine (1) 09/01/2020    Annual Wellness Visit (AWV)  08/15/2021    Colon cancer screen colonoscopy  09/01/2022    Hepatitis C screen  Addressed    HIV screen  Addressed    Hepatitis A vaccine  Aged Out    Hepatitis B vaccine  Aged Out    Hib vaccine  Aged Out    Meningococcal (ACWY) vaccine  Aged Out    Pneumococcal 0-64 years Vaccine  Aged Out             (applicable per patient's age: Cancer Screenings, Depression Screening, Fall Risk Screening, Immunizations)    LDL Cholesterol (mg/dL)   Date Value   06/06/2016 72     AST (U/L)   Date Value   03/24/2015 15     ALT (U/L)   Date Value   03/24/2015 9     BUN (mg/dL)   Date Value   03/24/2015 18      (goal A1C is < 7)   (goal LDL is <100) need 30-50% reduction from baseline     BP Readings from Last 3 Encounters:   07/18/19 118/72   06/07/18 137/83   04/17/18 (!) 160/90    (goal /80)      All Future Testing planned in [facility]:  Lab Frequency Next Occurrence   CBC Auto Differential Once 08/14/2021   Comprehensive Metabolic Panel Once 31/02/5998   Lipid Panel Once 08/14/2021       Next Visit Date:  No future appointments.          Patient Active Problem List:     Syncope     Osteoarthritis     History of CVA (cerebrovascular accident)     Essential hypertension     Vertigo     Chronic allergic rhinitis     Morbid obesity with BMI of 50.0-59.9, adult ([facility] [address]     Mixed hyperlipidemia

## 2021-02-05 DIAGNOSIS — I10 ESSENTIAL HYPERTENSION: ICD-10-CM

## 2021-02-05 RX ORDER — AMLODIPINE BESYLATE 10 MG/1
TABLET ORAL
Qty: 90 TABLET | Refills: 0 | Status: SHIPPED | OUTPATIENT
Start: 2021-02-05 | End: 2021-05-11

## 2021-02-05 RX ORDER — HYDROCHLOROTHIAZIDE 25 MG/1
TABLET ORAL
Qty: 90 TABLET | Refills: 0 | Status: SHIPPED | OUTPATIENT
Start: 2021-02-05 | End: 2021-05-26

## 2021-02-05 RX ORDER — HYDRALAZINE HYDROCHLORIDE 25 MG/1
TABLET, FILM COATED ORAL
Qty: 180 TABLET | Refills: 0 | Status: SHIPPED | OUTPATIENT
Start: 2021-02-05 | End: 2021-05-26

## 2021-02-05 NOTE — TELEPHONE ENCOUNTER
Last OV 08/14/2020    Health Maintenance   Topic Date Due    Creatinine monitoring  1960    DTaP/Tdap/Td vaccine (1 - Tdap) 09/02/1978    Shingles Vaccine (1 of 2) 09/02/2009    Breast cancer screen  09/01/2015    Potassium monitoring  03/24/2016    Lipid screen  06/06/2017    Cervical cancer screen  09/01/2017    Flu vaccine (1) 09/01/2020    Annual Wellness Visit (AWV)  08/15/2021    Colon cancer screen colonoscopy  09/01/2022    Hepatitis C screen  Completed    HIV screen  Addressed    Hepatitis A vaccine  Aged Out    Hepatitis B vaccine  Aged Out    Hib vaccine  Aged Out    Meningococcal (ACWY) vaccine  Aged Out    Pneumococcal 0-64 years Vaccine  Aged Out             (applicable per patient's age: Cancer Screenings, Depression Screening, Fall Risk Screening, Immunizations)    LDL Cholesterol (mg/dL)   Date Value   06/06/2016 72     AST (U/L)   Date Value   03/24/2015 15     ALT (U/L)   Date Value   03/24/2015 9     BUN (mg/dL)   Date Value   03/24/2015 18      (goal A1C is < 7)   (goal LDL is <100) need 30-50% reduction from baseline     BP Readings from Last 3 Encounters:   07/18/19 118/72   06/07/18 137/83   04/17/18 (!) 160/90    (goal /80)      All Future Testing planned in [facility]:  Lab Frequency Next Occurrence   CBC Auto Differential Once 08/14/2021   Comprehensive Metabolic Panel Once 74/02/8843   Lipid Panel Once 08/14/2021       Next Visit Date:  No future appointments.          Patient Active Problem List:     Syncope     Osteoarthritis     History of CVA (cerebrovascular accident)     Essential hypertension     Vertigo     Chronic allergic rhinitis     Morbid obesity with BMI of 50.0-59.9, adult ([facility] [address]     Mixed hyperlipidemia

## 2021-05-11 DIAGNOSIS — Z86.73 HISTORY OF CVA (CEREBROVASCULAR ACCIDENT): ICD-10-CM

## 2021-05-11 RX ORDER — ASPIRIN AND DIPYRIDAMOLE 25; 200 MG/1; MG/1
CAPSULE, EXTENDED RELEASE ORAL
Qty: 180 CAPSULE | Refills: 1 | Status: SHIPPED | OUTPATIENT
Start: 2021-05-11 | End: 2021-11-03

## 2021-08-23 RX ORDER — MELOXICAM 15 MG/1
TABLET ORAL
Qty: 90 TABLET | Refills: 3 | Status: SHIPPED | OUTPATIENT
Start: 2021-08-23 | End: 2022-08-17

## 2021-09-09 ENCOUNTER — OFFICE VISIT (OUTPATIENT)
Dept: PRIMARY CARE CLINIC | Age: 61
End: 2021-09-09
Payer: MEDICARE

## 2021-09-09 ENCOUNTER — HOSPITAL ENCOUNTER (OUTPATIENT)
Age: 61
Setting detail: SPECIMEN
Discharge: HOME | End: 2021-09-09
Payer: MEDICARE

## 2021-09-09 VITALS
OXYGEN SATURATION: 97 % | HEART RATE: 107 BPM | BODY MASS INDEX: 53.92 KG/M2 | DIASTOLIC BLOOD PRESSURE: 88 MMHG | SYSTOLIC BLOOD PRESSURE: 124 MMHG | WEIGHT: 293 LBS | RESPIRATION RATE: 18 BRPM | HEIGHT: 62 IN

## 2021-09-09 DIAGNOSIS — Z23 NEED FOR INFLUENZA VACCINATION: ICD-10-CM

## 2021-09-09 DIAGNOSIS — Z74.09 DECREASED MOBILITY AND ENDURANCE: ICD-10-CM

## 2021-09-09 DIAGNOSIS — I10 ESSENTIAL HYPERTENSION: ICD-10-CM

## 2021-09-09 DIAGNOSIS — Z13.0 SCREENING, ANEMIA, DEFICIENCY, IRON: ICD-10-CM

## 2021-09-09 DIAGNOSIS — M17.0 PRIMARY OSTEOARTHRITIS OF BOTH KNEES: ICD-10-CM

## 2021-09-09 DIAGNOSIS — E78.2 MIXED HYPERLIPIDEMIA: ICD-10-CM

## 2021-09-09 DIAGNOSIS — Z51.81 THERAPEUTIC DRUG MONITORING: ICD-10-CM

## 2021-09-09 DIAGNOSIS — R26.81 UNSTABLE GAIT: ICD-10-CM

## 2021-09-09 DIAGNOSIS — I10 ESSENTIAL HYPERTENSION: Primary | ICD-10-CM

## 2021-09-09 DIAGNOSIS — Z12.31 ENCOUNTER FOR SCREENING MAMMOGRAM FOR BREAST CANCER: ICD-10-CM

## 2021-09-09 LAB
ABSOLUTE EOS #: 0.04 K/UL (ref 0–0.44)
ABSOLUTE IMMATURE GRANULOCYTE: <0.03 K/UL (ref 0–0.3)
ABSOLUTE LYMPH #: 2.13 K/UL (ref 1.1–3.7)
ABSOLUTE MONO #: 0.39 K/UL (ref 0.1–1.2)
ALBUMIN SERPL-MCNC: 4.3 G/DL (ref 3.5–5.2)
ALBUMIN/GLOBULIN RATIO: 1 (ref 1–2.5)
ALCOHOL URINE: NORMAL
ALP BLD-CCNC: 71 U/L (ref 35–104)
ALT SERPL-CCNC: 11 U/L (ref 5–33)
AMPHETAMINE SCREEN, URINE: NORMAL
ANION GAP SERPL CALCULATED.3IONS-SCNC: 14 MMOL/L (ref 9–17)
AST SERPL-CCNC: 20 U/L
BARBITURATE SCREEN, URINE: NORMAL
BASOPHILS # BLD: 1 % (ref 0–2)
BASOPHILS ABSOLUTE: 0.06 K/UL (ref 0–0.2)
BENZODIAZEPINE SCREEN, URINE: NORMAL
BILIRUB SERPL-MCNC: 0.4 MG/DL (ref 0.3–1.2)
BUN BLDV-MCNC: 17 MG/DL (ref 8–23)
BUN/CREAT BLD: ABNORMAL (ref 9–20)
BUPRENORPHINE URINE: NORMAL
CALCIUM SERPL-MCNC: 9.6 MG/DL (ref 8.6–10.4)
CHLORIDE BLD-SCNC: 103 MMOL/L (ref 98–107)
CHOLESTEROL, FASTING: 252 MG/DL
CHOLESTEROL/HDL RATIO: 4.1
CO2: 23 MMOL/L (ref 20–31)
COCAINE METABOLITE SCREEN URINE: NORMAL
CREAT SERPL-MCNC: 0.65 MG/DL (ref 0.5–0.9)
DIFFERENTIAL TYPE: NORMAL
EOSINOPHILS RELATIVE PERCENT: 1 % (ref 1–4)
FENTANYL SCREEN, URINE: NORMAL
GABAPENTIN SCREEN, URINE: NORMAL
GFR AFRICAN AMERICAN: >60 ML/MIN
GFR NON-AFRICAN AMERICAN: >60 ML/MIN
GFR SERPL CREATININE-BSD FRML MDRD: ABNORMAL ML/MIN/{1.73_M2}
GFR SERPL CREATININE-BSD FRML MDRD: ABNORMAL ML/MIN/{1.73_M2}
GLUCOSE BLD-MCNC: 75 MG/DL (ref 70–99)
HCT VFR BLD CALC: 41 % (ref 36.3–47.1)
HDLC SERPL-MCNC: 61 MG/DL
HEMOGLOBIN: 12.5 G/DL (ref 11.9–15.1)
IMMATURE GRANULOCYTES: 0 %
LDL CHOLESTEROL: 176 MG/DL (ref 0–130)
LYMPHOCYTES # BLD: 33 % (ref 24–43)
MCH RBC QN AUTO: 27 PG (ref 25.2–33.5)
MCHC RBC AUTO-ENTMCNC: 30.5 G/DL (ref 28.4–34.8)
MCV RBC AUTO: 88.6 FL (ref 82.6–102.9)
MDMA URINE: NORMAL
METHADONE SCREEN, URINE: NORMAL
METHAMPHETAMINE, URINE: NORMAL
MONOCYTES # BLD: 6 % (ref 3–12)
NRBC AUTOMATED: 0 PER 100 WBC
OPIATE SCREEN URINE: NORMAL
OXYCODONE SCREEN URINE: NORMAL
PDW BLD-RTO: 13.2 % (ref 11.8–14.4)
PHENCYCLIDINE SCREEN URINE: NORMAL
PLATELET # BLD: 340 K/UL (ref 138–453)
PLATELET ESTIMATE: NORMAL
PMV BLD AUTO: 10 FL (ref 8.1–13.5)
POTASSIUM SERPL-SCNC: 3.7 MMOL/L (ref 3.7–5.3)
PROPOXYPHENE SCREEN, URINE: NORMAL
RBC # BLD: 4.63 M/UL (ref 3.95–5.11)
RBC # BLD: NORMAL 10*6/UL
SEG NEUTROPHILS: 59 % (ref 36–65)
SEGMENTED NEUTROPHILS ABSOLUTE COUNT: 3.74 K/UL (ref 1.5–8.1)
SODIUM BLD-SCNC: 140 MMOL/L (ref 135–144)
SYNTHETIC CANNABINOIDS(K2) SCREEN, URINE: NORMAL
THC SCREEN, URINE: NORMAL
TOTAL PROTEIN: 8.5 G/DL (ref 6.4–8.3)
TRAMADOL SCREEN URINE: NORMAL
TRICYCLIC ANTIDEPRESSANTS, UR: NORMAL
TRIGLYCERIDE, FASTING: 75 MG/DL
VLDLC SERPL CALC-MCNC: ABNORMAL MG/DL (ref 1–30)
WBC # BLD: 6.4 K/UL (ref 3.5–11.3)
WBC # BLD: NORMAL 10*3/UL

## 2021-09-09 PROCEDURE — 3017F COLORECTAL CA SCREEN DOC REV: CPT | Performed by: NURSE PRACTITIONER

## 2021-09-09 PROCEDURE — 99214 OFFICE O/P EST MOD 30 MIN: CPT | Performed by: NURSE PRACTITIONER

## 2021-09-09 PROCEDURE — 80305 DRUG TEST PRSMV DIR OPT OBS: CPT | Performed by: NURSE PRACTITIONER

## 2021-09-09 PROCEDURE — 90674 CCIIV4 VAC NO PRSV 0.5 ML IM: CPT | Performed by: NURSE PRACTITIONER

## 2021-09-09 PROCEDURE — G8427 DOCREV CUR MEDS BY ELIG CLIN: HCPCS | Performed by: NURSE PRACTITIONER

## 2021-09-09 PROCEDURE — G0008 ADMIN INFLUENZA VIRUS VAC: HCPCS | Performed by: NURSE PRACTITIONER

## 2021-09-09 PROCEDURE — 1036F TOBACCO NON-USER: CPT | Performed by: NURSE PRACTITIONER

## 2021-09-09 PROCEDURE — G8417 CALC BMI ABV UP PARAM F/U: HCPCS | Performed by: NURSE PRACTITIONER

## 2021-09-09 RX ORDER — TRAMADOL HYDROCHLORIDE 50 MG/1
50 TABLET ORAL 3 TIMES DAILY PRN
Qty: 90 TABLET | Refills: 3 | Status: SHIPPED | OUTPATIENT
Start: 2021-09-09 | End: 2022-10-20 | Stop reason: SDUPTHER

## 2021-09-09 SDOH — ECONOMIC STABILITY: FOOD INSECURITY: WITHIN THE PAST 12 MONTHS, YOU WORRIED THAT YOUR FOOD WOULD RUN OUT BEFORE YOU GOT MONEY TO BUY MORE.: NEVER TRUE

## 2021-09-09 SDOH — ECONOMIC STABILITY: FOOD INSECURITY: WITHIN THE PAST 12 MONTHS, THE FOOD YOU BOUGHT JUST DIDN'T LAST AND YOU DIDN'T HAVE MONEY TO GET MORE.: NEVER TRUE

## 2021-09-09 ASSESSMENT — ENCOUNTER SYMPTOMS
WHEEZING: 0
SHORTNESS OF BREATH: 0
DIARRHEA: 0
SORE THROAT: 0
TROUBLE SWALLOWING: 0
ABDOMINAL PAIN: 0
SINUS PRESSURE: 0
NAUSEA: 0
CONSTIPATION: 0
COUGH: 0
VOMITING: 0
BLOOD IN STOOL: 0

## 2021-09-09 ASSESSMENT — PATIENT HEALTH QUESTIONNAIRE - PHQ9
1. LITTLE INTEREST OR PLEASURE IN DOING THINGS: 0
2. FEELING DOWN, DEPRESSED OR HOPELESS: 1
SUM OF ALL RESPONSES TO PHQ QUESTIONS 1-9: 1
SUM OF ALL RESPONSES TO PHQ QUESTIONS 1-9: 1
SUM OF ALL RESPONSES TO PHQ9 QUESTIONS 1 & 2: 1
SUM OF ALL RESPONSES TO PHQ QUESTIONS 1-9: 1

## 2021-09-09 ASSESSMENT — SOCIAL DETERMINANTS OF HEALTH (SDOH): HOW HARD IS IT FOR YOU TO PAY FOR THE VERY BASICS LIKE FOOD, HOUSING, MEDICAL CARE, AND HEATING?: NOT HARD AT ALL

## 2021-09-09 NOTE — PROGRESS NOTES
Is the person to be vaccinated sick today? no    Does the person to be vaccinated have an allergy to eggs or to a component of the vaccine? No    Has the person to be vaccinated ever had a serious reaction to influenza vaccine in the past? No    Has the person to be vaccinated ever had [place]' Syndrome? No      After obtaining consent, and per orders of Veronika Piper CNP, injection of Influenza Vaccine given in Left deltoid by Jojo Dawkins MA. Patient instructed to remain in clinic for 20 minutes afterwards, and to report any adverse reaction to me immediately.

## 2021-09-09 NOTE — PROGRESS NOTES
[facility] PRIMARY CARE  [facility] [address] [facility] [address]  [address] [address]  Dept: [phone]  Dept Fax: [phone]    Marguerite Min is a 57 y.o. female who presentstoday for her medical conditions/complaints as noted below. Marguerite Min is c/o of  Chief Complaint   Patient presents with    Follow-up     Discuss need for rolling walker       HPI:     Here today for follow up and face to face visit for rolling walker  She reports over the past year her arthritis pain has worsened making walking distances difficult  Will have to stop frequently to rest  Her bilateral knee pain will cause her to feel unsteady at times  She is currently using an old device of her sister's    Has been over 2 years since her last in office visit  She states she has been vigilant with minimizing exposures as she fears COVID-19  She has been vaccinated    She states she otherwise feels well in general  Requesting refill on tramadol, last refill was 1 year ago  She states she reserves the medication for severe pain only  Has been out of the medication for a few months      Hypertension  This is a chronic problem. The current episode started more than 1 year ago. The problem is controlled. Pertinent negatives include no chest pain, headaches, palpitations, peripheral edema or shortness of breath. Risk factors for coronary artery disease include sedentary lifestyle, obesity and post-menopausal state. Past treatments include direct vasodilators, diuretics and calcium channel blockers. The current treatment provides significant improvement.        No results found for: LABA1C          ( goal A1C is < 7)   No results found for: LABMICR  LDL Cholesterol (mg/dL)   Date Value   2016 72   2015 146 (H)       (goal LDL is <100)   AST (U/L)   Date Value   2015 15     ALT (U/L)   Date Value   2015 9     BUN (mg/dL)   Date Value   2015 18     BP Readings from Last 3 Encounters: 09/09/21 124/88   07/18/19 118/72   06/07/18 137/83          ([id number])    Past Medical History:   Diagnosis Date    CVA [facility]cerebral vascular accident) ([facility] [address]     Hypertension     Osteoarthritis     knee    Syncope       History reviewed. No pertinent surgical history. Family History   Problem Relation Age of Onset    Hypertension Mother     Breast Cancer Mother     Hypertension Father     Stroke Father     Tuberculosis Sister     Hypertension Sister     Colon Cancer Sister     Heart Disease Sister     Allergies Sister    [facility] Migraines Sister     Hypertension Brother     Heart Disease Brother           Social History     Tobacco Use    Smoking status: Never Smoker    Smokeless tobacco: Never Used   Substance Use Topics    Alcohol use: No      Current Outpatient Medications   Medication Sig Dispense Refill    traMADol (ULTRAM) 50 MG tablet Take 1 tablet by mouth 3 times daily as needed for Pain for up to 220 days. 90 tablet 3    Misc.  Devices MISC Provide rolling walker, please fit for appropriate sizing 1 each 0    meloxicam (MOBIC) 15 MG tablet TAKE 1 TABLET BY MOUTH EVERY DAY 90 tablet 3    hydrALAZINE (APRESOLINE) 25 MG tablet TAKE 1 TABLET BY MOUTH TWICE A  tablet 0    hydroCHLOROthiazide (HYDRODIURIL) 25 MG tablet TAKE 1 TABLET BY MOUTH EVERY DAY 90 tablet 0    amLODIPine (NORVASC) 10 MG tablet TAKE 1 TABLET BY MOUTH EVERY DAY 90 tablet 3    aspirin-dipyridamole (AGGRENOX)  MG per extended release capsule TAKE 1 CAPSULE BY MOUTH TWICE A  capsule 1    loratadine (CLARITIN) 10 MG tablet TAKE 1 TABLET BY MOUTH EVERY DAY 90 tablet 1    atorvastatin (LIPITOR) 20 MG tablet TAKE 1 TABLET BY MOUTH EVERY DAY 90 tablet 3    meclizine (ANTIVERT) 12.5 MG tablet TAKE 1 TABLET BY MOUTH THREE TIMES A DAY AS NEEDED 90 tablet 5    aspirin-acetaminophen-caffeine (EXCEDRIN MIGRAINE) 410-446-20 MG per tablet Take 1 tablet by mouth every 6 hours as needed for Pain 90 tablet 3 weakness, light-headedness and headaches. Psychiatric/Behavioral: Negative for confusion. The patient is not nervous/anxious. Objective:     Physical Exam  Constitutional:       Appearance: She is well-developed. HENT:      Head: Normocephalic. Eyes:      Conjunctiva/sclera: Conjunctivae normal.      Pupils: Pupils are equal, round, and reactive to light. Cardiovascular:      Rate and Rhythm: Normal rate and regular rhythm. Heart sounds: Normal heart sounds. No murmur heard. Pulmonary:      Effort: Pulmonary effort is normal.      Breath sounds: Normal breath sounds. No wheezing. Abdominal:      General: Bowel sounds are normal. There is no distension. Palpations: Abdomen is soft. Musculoskeletal:      Cervical back: Normal range of motion. Right knee: Decreased range of motion. Tenderness present. Left knee: Decreased range of motion. Tenderness present. Comments: Gait slow, using walker or cane at all times   Skin:     General: Skin is warm and dry. Neurological:      Mental Status: She is alert and oriented to person, place, and time. Psychiatric:         Behavior: Behavior normal.         Thought Content: Thought content normal.         Judgment: Judgment normal.       /88   Pulse 107   Resp 18   Ht 5' 2\" (1.575 m)   Wt 295 lb 6.4 oz (134 kg)   SpO2 97%   BMI 54.03 kg/m²     Assessment:       Diagnosis Orders   1. Essential hypertension  Comprehensive Metabolic Panel    Lipid Panel   2. Therapeutic drug monitoring  POCT Rapid Drug Screen   3. Primary osteoarthritis of both knees  traMADol (ULTRAM) 50 MG tablet    Misc. Devices MISC   4. Encounter for screening mammogram for breast cancer  HEMA Digital Screen Bilateral [[id number]]   5. Need for influenza vaccination  INFLUENZA, MDCK QUADV, 2 YRS AND OLDER, IM, PF, PREFILL SYR OR SDV, 0.5ML (FLUCELVAX QUADV, PF)   6. Mixed hyperlipidemia  Comprehensive Metabolic Panel    Lipid Panel   7.  Screening, anemia, deficiency, iron  CBC Auto Differential   8. Unstable gait  Misc. Devices MISC   9. Decreased mobility and endurance  Misc. Devices MISC             Plan:      Return in about 1 year (around 9/9/2022) for hypertension check, OA. Hypertension, hyperlipidemia-past due for labs, she is agreeable to complete today. Continue current medications for now  Osteoarthritis, unstable gait, decreased mobility and endurance-patient at high risk for falls due to osteoarthritis and decreased mobility. Prescription provided for rolling walker for use both at home and when she leaves her home  Urine drug screen negative, has been out of tramadol a few months, refill provided for severe pain  Orders Placed This Encounter   Procedures    HEMA Digital Screen Bilateral [[id number]]     Standing Status:   Future     Standing Expiration Date:   9/9/2022    INFLUENZA, MDCK QUADV, 2 YRS AND OLDER, IM, PF, PREFILL SYR OR SDV, 0.5ML (FLUCELVAX QUADV, PF)    CBC Auto Differential     Standing Status:   Future     Number of Occurrences:   1     Standing Expiration Date:   9/9/2022    Comprehensive Metabolic Panel     Standing Status:   Future     Number of Occurrences:   1     Standing Expiration Date:   9/9/2022    Lipid Panel     Standing Status:   Future     Number of Occurrences:   1     Standing Expiration Date:   9/9/2022     Order Specific Question:   Is Patient Fasting?/# of Hours     Answer:   8    POCT Rapid Drug Screen        Orders Placed This Encounter   Medications    traMADol (ULTRAM) 50 MG tablet     Sig: Take 1 tablet by mouth 3 times daily as needed for Pain for up to 220 days. Dispense:  90 tablet     Refill:  3     Reduce doses taken as pain becomes manageable    Misc. Devices MISC     Sig: Provide rolling walker, please fit for appropriate sizing     Dispense:  1 each     Refill:  0       Patient given educational materials - see patient instructions. Discussed use, benefit, and side effects of prescribed medications. All patientquestions answered. Pt voiced understanding. Reviewed health maintenance. Instructedto continue current medications, diet and exercise. Patient agreed with treatmentplan. Follow up as directed.      Electronicallysigned by DEMARCUS Soliman CNP on 9/9/2021 at 10:47 AM

## 2021-09-13 DIAGNOSIS — I10 ESSENTIAL HYPERTENSION: ICD-10-CM

## 2021-09-13 RX ORDER — HYDROCHLOROTHIAZIDE 25 MG/1
TABLET ORAL
Qty: 90 TABLET | Refills: 3 | Status: SHIPPED | OUTPATIENT
Start: 2021-09-13 | End: 2022-08-22

## 2021-11-08 DIAGNOSIS — J30.9 CHRONIC ALLERGIC RHINITIS: ICD-10-CM

## 2021-11-08 RX ORDER — LORATADINE 10 MG/1
TABLET ORAL
Qty: 90 TABLET | Refills: 1 | Status: SHIPPED | OUTPATIENT
Start: 2021-11-08 | End: 2022-10-20 | Stop reason: SDUPTHER

## 2022-02-25 ENCOUNTER — TELEPHONE (OUTPATIENT)
Dept: PRIMARY CARE CLINIC | Age: 62
End: 2022-02-25

## 2022-02-25 NOTE — TELEPHONE ENCOUNTER
Called patient to see if she had mammogram completed, she did not. She declines to have done, writer offered to schedule for her.

## 2022-03-08 DIAGNOSIS — I10 ESSENTIAL HYPERTENSION: ICD-10-CM

## 2022-03-08 RX ORDER — AMLODIPINE BESYLATE 10 MG/1
TABLET ORAL
Qty: 90 TABLET | Refills: 3 | Status: SHIPPED | OUTPATIENT
Start: 2022-03-08

## 2022-08-17 RX ORDER — MELOXICAM 15 MG/1
TABLET ORAL
Qty: 90 TABLET | Refills: 3 | Status: SHIPPED | OUTPATIENT
Start: 2022-08-17

## 2022-08-22 DIAGNOSIS — I10 ESSENTIAL HYPERTENSION: ICD-10-CM

## 2022-08-22 RX ORDER — HYDROCHLOROTHIAZIDE 25 MG/1
TABLET ORAL
Qty: 90 TABLET | Refills: 3 | Status: SHIPPED | OUTPATIENT
Start: 2022-08-22

## 2022-10-20 ENCOUNTER — HOSPITAL ENCOUNTER (OUTPATIENT)
Age: 62
Setting detail: SPECIMEN
Discharge: HOME | End: 2022-10-20

## 2022-10-20 ENCOUNTER — CLINICAL DOCUMENTATION (OUTPATIENT)
Dept: SPIRITUAL SERVICES | Age: 62
End: 2022-10-20

## 2022-10-20 ENCOUNTER — OFFICE VISIT (OUTPATIENT)
Dept: PRIMARY CARE CLINIC | Age: 62
End: 2022-10-20
Payer: MEDICARE

## 2022-10-20 VITALS
HEIGHT: 62 IN | BODY MASS INDEX: 52.81 KG/M2 | WEIGHT: 287 LBS | HEART RATE: 100 BPM | DIASTOLIC BLOOD PRESSURE: 70 MMHG | SYSTOLIC BLOOD PRESSURE: 130 MMHG | OXYGEN SATURATION: 99 %

## 2022-10-20 DIAGNOSIS — M17.0 PRIMARY OSTEOARTHRITIS OF BOTH KNEES: ICD-10-CM

## 2022-10-20 DIAGNOSIS — J30.9 CHRONIC ALLERGIC RHINITIS: ICD-10-CM

## 2022-10-20 DIAGNOSIS — Z00.00 MEDICARE ANNUAL WELLNESS VISIT, SUBSEQUENT: Primary | ICD-10-CM

## 2022-10-20 DIAGNOSIS — Z71.89 ACP (ADVANCE CARE PLANNING): ICD-10-CM

## 2022-10-20 DIAGNOSIS — R42 VERTIGO: ICD-10-CM

## 2022-10-20 DIAGNOSIS — Z86.73 HISTORY OF CVA (CEREBROVASCULAR ACCIDENT): ICD-10-CM

## 2022-10-20 DIAGNOSIS — E55.9 VITAMIN D DEFICIENCY: ICD-10-CM

## 2022-10-20 DIAGNOSIS — E78.2 MIXED HYPERLIPIDEMIA: ICD-10-CM

## 2022-10-20 DIAGNOSIS — I10 ESSENTIAL HYPERTENSION: ICD-10-CM

## 2022-10-20 DIAGNOSIS — Z23 NEED FOR INFLUENZA VACCINATION: ICD-10-CM

## 2022-10-20 DIAGNOSIS — Z12.31 ENCOUNTER FOR SCREENING MAMMOGRAM FOR MALIGNANT NEOPLASM OF BREAST: ICD-10-CM

## 2022-10-20 LAB
ABSOLUTE EOS #: 0.04 K/UL (ref 0–0.44)
ABSOLUTE IMMATURE GRANULOCYTE: 0.03 K/UL (ref 0–0.3)
ABSOLUTE LYMPH #: 2.22 K/UL (ref 1.1–3.7)
ABSOLUTE MONO #: 0.5 K/UL (ref 0.1–1.2)
ALBUMIN SERPL-MCNC: 4.3 G/DL (ref 3.5–5.2)
ALBUMIN/GLOBULIN RATIO: 1 (ref 1–2.5)
ALP BLD-CCNC: 78 U/L (ref 35–104)
ALT SERPL-CCNC: 16 U/L (ref 5–33)
ANION GAP SERPL CALCULATED.3IONS-SCNC: 17 MMOL/L (ref 9–17)
AST SERPL-CCNC: 22 U/L
BASOPHILS # BLD: 1 % (ref 0–2)
BASOPHILS ABSOLUTE: 0.06 K/UL (ref 0–0.2)
BILIRUB SERPL-MCNC: 0.4 MG/DL (ref 0.3–1.2)
BUN BLDV-MCNC: 20 MG/DL (ref 8–23)
CALCIUM SERPL-MCNC: 9.8 MG/DL (ref 8.6–10.4)
CHLORIDE BLD-SCNC: 100 MMOL/L (ref 98–107)
CHOLESTEROL/HDL RATIO: 3.4
CHOLESTEROL: 224 MG/DL
CO2: 23 MMOL/L (ref 20–31)
CREAT SERPL-MCNC: 0.69 MG/DL (ref 0.5–0.9)
EOSINOPHILS RELATIVE PERCENT: 1 % (ref 1–4)
GFR SERPL CREATININE-BSD FRML MDRD: >60 ML/MIN/1.73M2
GLUCOSE BLD-MCNC: 87 MG/DL (ref 70–99)
HCT VFR BLD CALC: 43.2 % (ref 36.3–47.1)
HDLC SERPL-MCNC: 65 MG/DL
HEMOGLOBIN: 13.2 G/DL (ref 11.9–15.1)
IMMATURE GRANULOCYTES: 0 %
LDL CHOLESTEROL: 145 MG/DL (ref 0–130)
LYMPHOCYTES # BLD: 30 % (ref 24–43)
MCH RBC QN AUTO: 27.5 PG (ref 25.2–33.5)
MCHC RBC AUTO-ENTMCNC: 30.6 G/DL (ref 28.4–34.8)
MCV RBC AUTO: 90 FL (ref 82.6–102.9)
MONOCYTES # BLD: 7 % (ref 3–12)
NRBC AUTOMATED: 0 PER 100 WBC
PDW BLD-RTO: 13.2 % (ref 11.8–14.4)
PLATELET # BLD: 338 K/UL (ref 138–453)
PMV BLD AUTO: 9.8 FL (ref 8.1–13.5)
POTASSIUM SERPL-SCNC: 3.8 MMOL/L (ref 3.7–5.3)
RBC # BLD: 4.8 M/UL (ref 3.95–5.11)
SEG NEUTROPHILS: 61 % (ref 36–65)
SEGMENTED NEUTROPHILS ABSOLUTE COUNT: 4.65 K/UL (ref 1.5–8.1)
SODIUM BLD-SCNC: 140 MMOL/L (ref 135–144)
TOTAL PROTEIN: 8.7 G/DL (ref 6.4–8.3)
TRIGL SERPL-MCNC: 71 MG/DL
VITAMIN D 25-HYDROXY: 35.6 NG/ML
WBC # BLD: 7.5 K/UL (ref 3.5–11.3)

## 2022-10-20 PROCEDURE — G0008 ADMIN INFLUENZA VIRUS VAC: HCPCS | Performed by: NURSE PRACTITIONER

## 2022-10-20 PROCEDURE — G8482 FLU IMMUNIZE ORDER/ADMIN: HCPCS | Performed by: NURSE PRACTITIONER

## 2022-10-20 PROCEDURE — G0439 PPPS, SUBSEQ VISIT: HCPCS | Performed by: NURSE PRACTITIONER

## 2022-10-20 PROCEDURE — 3017F COLORECTAL CA SCREEN DOC REV: CPT | Performed by: NURSE PRACTITIONER

## 2022-10-20 PROCEDURE — 90674 CCIIV4 VAC NO PRSV 0.5 ML IM: CPT | Performed by: NURSE PRACTITIONER

## 2022-10-20 RX ORDER — TRAMADOL HYDROCHLORIDE 50 MG/1
50 TABLET ORAL 3 TIMES DAILY PRN
Qty: 90 TABLET | Refills: 0 | Status: SHIPPED | OUTPATIENT
Start: 2022-10-20 | End: 2022-11-19

## 2022-10-20 RX ORDER — HYDRALAZINE HYDROCHLORIDE 25 MG/1
TABLET, FILM COATED ORAL
Qty: 180 TABLET | Refills: 3 | Status: SHIPPED | OUTPATIENT
Start: 2022-10-20

## 2022-10-20 RX ORDER — LORATADINE 10 MG/1
TABLET ORAL
Qty: 90 TABLET | Refills: 3 | Status: SHIPPED | OUTPATIENT
Start: 2022-10-20

## 2022-10-20 RX ORDER — ASPIRIN AND DIPYRIDAMOLE 25; 200 MG/1; MG/1
1 CAPSULE, EXTENDED RELEASE ORAL 2 TIMES DAILY
Qty: 180 CAPSULE | Refills: 3 | Status: SHIPPED | OUTPATIENT
Start: 2022-10-20

## 2022-10-20 RX ORDER — MECLIZINE HCL 12.5 MG/1
TABLET ORAL
Qty: 90 TABLET | Refills: 5 | Status: SHIPPED | OUTPATIENT
Start: 2022-10-20

## 2022-10-20 SDOH — ECONOMIC STABILITY: FOOD INSECURITY: WITHIN THE PAST 12 MONTHS, THE FOOD YOU BOUGHT JUST DIDN'T LAST AND YOU DIDN'T HAVE MONEY TO GET MORE.: NEVER TRUE

## 2022-10-20 SDOH — ECONOMIC STABILITY: FOOD INSECURITY: WITHIN THE PAST 12 MONTHS, YOU WORRIED THAT YOUR FOOD WOULD RUN OUT BEFORE YOU GOT MONEY TO BUY MORE.: SOMETIMES TRUE

## 2022-10-20 ASSESSMENT — PATIENT HEALTH QUESTIONNAIRE - PHQ9
SUM OF ALL RESPONSES TO PHQ9 QUESTIONS 1 & 2: 0
SUM OF ALL RESPONSES TO PHQ QUESTIONS 1-9: 0
SUM OF ALL RESPONSES TO PHQ QUESTIONS 1-9: 0
2. FEELING DOWN, DEPRESSED OR HOPELESS: 0
1. LITTLE INTEREST OR PLEASURE IN DOING THINGS: 0
SUM OF ALL RESPONSES TO PHQ QUESTIONS 1-9: 0
SUM OF ALL RESPONSES TO PHQ QUESTIONS 1-9: 0

## 2022-10-20 ASSESSMENT — LIFESTYLE VARIABLES
HOW OFTEN DO YOU HAVE A DRINK CONTAINING ALCOHOL: NEVER
HOW MANY STANDARD DRINKS CONTAINING ALCOHOL DO YOU HAVE ON A TYPICAL DAY: PATIENT DOES NOT DRINK

## 2022-10-20 ASSESSMENT — SOCIAL DETERMINANTS OF HEALTH (SDOH): HOW HARD IS IT FOR YOU TO PAY FOR THE VERY BASICS LIKE FOOD, HOUSING, MEDICAL CARE, AND HEATING?: SOMEWHAT HARD

## 2022-10-20 NOTE — PROGRESS NOTES
Medicare Annual Wellness Visit    Shayy Ruiz is here for Medicare AWV    Assessment & Plan   Medicare annual wellness visit, subsequent-reviewed and discussed health maintenance, she is declining colon cancer screening at this time despite awareness of importance of early detection. She will consider mammography, order placed. Willing to do screening labs  The below chronic conditions remained stable and well-controlled, refills provided  Need for influenza vaccination  -     Influenza, FLUCELVAX, (age 10 mo+), IM, Preservative Free, 0.5 mL  Primary osteoarthritis of both knees  The following orders have not been finalized:  -     traMADol (ULTRAM) 50 MG tablet  Essential hypertension  Vitamin D deficiency  Vertigo  Mixed hyperlipidemia    Recommendations for Preventive Services Due: see orders and patient instructions/AVS.  Recommended screening schedule for the next 5-10 years is provided to the patient in written form: see Patient Instructions/AVS.     Return for Medicare Annual Wellness Visit in 1 year. Subjective   The following acute and/or chronic problems were also addressed today:  No new concerns  She declines colon CA screening, will consider mamm but has difficulty obtaining transportation    Patient's complete Health Risk Assessment and screening values have been reviewed and are found in Flowsheets. The following problems were reviewed today and where indicated follow up appointments were made and/or referrals ordered.     Positive Risk Factor Screenings with Interventions:    Fall Risk:  Do you feel unsteady or are you worried about falling? : (!) yes (Vertigo)  2 or more falls in past year?: no  Fall with injury in past year?: no   Fall Risk Interventions:    Home safety tips provided  Having intermittent vertigo, refill on antivert provided            General Health and ACP:  General  In general, how would you say your health is?: Very Good  In the past 7 days, have you experienced any of the following: New or Increased Pain, New or Increased Fatigue, Loneliness, Social Isolation, Stress or Anger?: No  Do you get the social and emotional support that you need?: Yes  Do you have a Living Will?: (!) No    Advance Directives       Power of Lucinda Rose Will ACP-Advance Directive ACP-Power of     Not on File Not on File Not on File Not on File        General Health Risk Interventions:  No Living Will: Patient referred to [place] Habits/Nutrition:  Physical Activity: Insufficiently Active    Days of Exercise per Week: 7 days    Minutes of Exercise per Session: 20 min     Have you lost any weight without trying in the past 3 months?: No  Body mass index: (!) 52.49  Have you seen the dentist within the past year?: (!) No  Health Habits/Nutrition Interventions:  Dental exam overdue:  patient encouraged to make appointment with his/her dentist    Hearing/Vision:  Do you or your family notice any trouble with your hearing that hasn't been managed with hearing aids?: No  Do you have difficulty driving, watching TV, or doing any of your daily activities because of your eyesight?: No  Have you had an eye exam within the past year?: (!) No  No results found. Hearing/Vision Interventions:  Vision concerns:  patient encouraged to make appointment with his/her eye specialist            Objective   Vitals:    10/20/22 0921   BP: 130/70   Pulse: 100   SpO2: 99%   Weight: 287 lb (130.2 kg)   Height: 5' 2\" (1.575 m)      Body mass index is 52.49 kg/m². Lungs are clear bilaterally, heart rate regular       Allergies   Allergen Reactions    Latex     Ace Inhibitors     Dye [Iodides]      rash    Pcn [Penicillins]      Prior to Visit Medications    Medication Sig Taking?  Authorizing Provider   hydroCHLOROthiazide (HYDRODIURIL) 25 MG tablet TAKE 1 TABLET BY MOUTH EVERY DAY  DEMARCUS Bullard - CNP   meloxicam (MOBIC) 15 MG tablet TAKE 1 TABLET BY MOUTH EVERY DAY  Marlee MALAVE DEMARCUS Duron CNP   amLODIPine (NORVASC) 10 MG tablet TAKE 1 TABLET BY MOUTH EVERY DAY  DEMARUCS Juarez CNP   hydrALAZINE (APRESOLINE) 25 MG tablet TAKE 1 TABLET BY MOUTH TWICE A DAY  DEMARCUS Ortega CNP   loratadine (CLARITIN) 10 MG tablet TAKE 1 TABLET BY MOUTH EVERY DAY  DEMARCUS Ortega CNP   aspirin-dipyridamole (AGGRENOX)  MG per extended release capsule TAKE 1 CAPSULE BY MOUTH TWICE A DAY  Marlee Maryann Serum, APRN - CNP   traMADol (ULTRAM) 50 MG tablet Take 1 tablet by mouth 3 times daily as needed for Pain for up to 220 days. DEMARCUS Vines CNP   Misc.  Devices MISC Provide rolling walker, please fit for appropriate sizing  DEMARCUS Vines CNP   atorvastatin (LIPITOR) 20 MG tablet TAKE 1 TABLET BY MOUTH EVERY DAY  Marlee Maryann Serum, APRN - CNP   meclizine (ANTIVERT) 12.5 MG tablet TAKE 1 TABLET BY MOUTH THREE TIMES A DAY AS NEEDED  DEMARCUS Vines CNP   aspirin-acetaminophen-caffeine (EXCEDRIN MIGRAINE) 936-058-97 MG per tablet Take 1 tablet by mouth every 6 hours as needed for Pain  Morris POLANCO Blood, DO       CareTeam (Including outside providers/suppliers regularly involved in providing care):   Patient Care Team:  DEMARCUS Vines CNP as PCP - General (Family Nurse Practitioner)  DEMARCUS Vines CNP as PCP - [facility] Empaneled Provider     Reviewed and updated this visit:  Tobacco  Allergies  Meds  Problems  Med Hx  Surg Hx  Soc Hx  Fam Hx

## 2022-10-20 NOTE — PATIENT INSTRUCTIONS
Personalized Preventive Plan for Hussein Cardona - 10/20/2022  Medicare offers a range of preventive health benefits. Some of the tests and screenings are paid in full while other may be subject to a deductible, co-insurance, and/or copay. Some of these benefits include a comprehensive review of your medical history including lifestyle, illnesses that may run in your family, and various assessments and screenings as appropriate. After reviewing your medical record and screening and assessments performed today your provider may have ordered immunizations, labs, imaging, and/or referrals for you. A list of these orders (if applicable) as well as your Preventive Care list are included within your After Visit Summary for your review. Other Preventive Recommendations:    A preventive eye exam performed by an eye specialist is recommended every 1-2 years to screen for glaucoma; cataracts, macular degeneration, and other eye disorders. A preventive dental visit is recommended every 6 months. Try to get at least 150 minutes of exercise per week or 10,000 steps per day on a pedometer . Order or download the FREE \"Exercise & Physical Activity: Your Everyday Guide\" from [employer]. Call 5-118.876.7518 or search [employer] online. You need 5040-8932 mg of calcium and 2294-9833 IU of vitamin D per day. It is possible to meet your calcium requirement with diet alone, but a vitamin D supplement is usually necessary to meet this goal.  When exposed to the sun, use a sunscreen that protects against both UVA and UVB radiation with an SPF of 30 or greater. Reapply every 2 to 3 hours or after sweating, drying off with a towel, or swimming. Always wear a seat belt when traveling in a car. Always wear a helmet when riding a bicycle or motorcycle.

## 2022-10-20 NOTE — ACP (ADVANCE CARE PLANNING)
Advance Care Planning   Ambulatory ACP Specialist Patient Outreach    Date:  10/20/2022  ACP Specialist:  ANA Villatoro    Outreach call to patient in follow-up to ACP Specialist referral from: DEMARCUS Sarkar CNP    [x] PCP  [x] Provider   [] Ambulatory Care Management [] Other for Reason:    [x] Advance Directive Assistance  [] Code Status Discussion  [] Complete Portable DNR Order  [] Discuss Goals of Care  [] Complete POST/MOST  [] Early ACP Decision-Making  [] Other    Date Referral Received: 10/20/2022    Today's Outreach:  [x] First   [] Second  [] Third                               Third outreach made by []  phone  [] email []   [employer]     Intervention:  [x] Spoke with Patient  [] Left VM requesting return call      Outcome: This ACP Coordinator spoke with patient offering an ACP conversation, patient was not ready to schedule at time of call and requested a follow-up call within 2 weeks. Next Step:   [] ACP scheduled conversation  [x] Outreach again in one week               [] Email / Mail ACP Info Sheets  [] Email / Mail Advance Directive            [] Close Referral. Routing closure to referring provider/staff and to ACP Specialist . [] Closure Letter mailed to Patient with Invitation to Contact ACP Specialist if/when ready.     Thank you for this referral.

## 2022-12-10 ENCOUNTER — APPOINTMENT (OUTPATIENT)
Dept: GENERAL RADIOLOGY | Age: 62
DRG: 488 | End: 2022-12-10
Payer: MEDICARE

## 2022-12-10 ENCOUNTER — HOSPITAL ENCOUNTER (INPATIENT)
Age: 62
LOS: 4 days | Discharge: NURSING, REHAB OR LONG-TERM CARE | DRG: 488 | End: 2022-12-14
Attending: EMERGENCY MEDICINE | Admitting: SURGERY
Payer: MEDICARE

## 2022-12-10 DIAGNOSIS — S82.002A CLOSED DISPLACED FRACTURE OF LEFT PATELLA, UNSPECIFIED FRACTURE MORPHOLOGY, INITIAL ENCOUNTER: Primary | ICD-10-CM

## 2022-12-10 PROBLEM — T14.8XXA AVULSION FRACTURE: Status: ACTIVE | Noted: 2022-12-10

## 2022-12-10 LAB
ABSOLUTE EOS #: <0.03 K/UL (ref 0–0.44)
ABSOLUTE IMMATURE GRANULOCYTE: 0.1 K/UL (ref 0–0.3)
ABSOLUTE LYMPH #: 1.78 K/UL (ref 1.1–3.7)
ABSOLUTE MONO #: 0.77 K/UL (ref 0.1–1.2)
ANION GAP SERPL CALCULATED.3IONS-SCNC: 12 MMOL/L (ref 9–17)
BASOPHILS # BLD: 0 % (ref 0–2)
BASOPHILS ABSOLUTE: 0.07 K/UL (ref 0–0.2)
BUN BLDV-MCNC: 15 MG/DL (ref 8–23)
CALCIUM SERPL-MCNC: 9 MG/DL (ref 8.6–10.4)
CHLORIDE BLD-SCNC: 103 MMOL/L (ref 98–107)
CO2: 25 MMOL/L (ref 20–31)
CREAT SERPL-MCNC: 0.66 MG/DL (ref 0.5–0.9)
EOSINOPHILS RELATIVE PERCENT: 0 % (ref 1–4)
GFR SERPL CREATININE-BSD FRML MDRD: >60 ML/MIN/1.73M2
GLUCOSE BLD-MCNC: 96 MG/DL (ref 70–99)
HCT VFR BLD CALC: 38.2 % (ref 36.3–47.1)
HEMOGLOBIN: 12.1 G/DL (ref 11.9–15.1)
IMMATURE GRANULOCYTES: 1 %
INR BLD: 1
LYMPHOCYTES # BLD: 11 % (ref 24–43)
MCH RBC QN AUTO: 27.1 PG (ref 25.2–33.5)
MCHC RBC AUTO-ENTMCNC: 31.7 G/DL (ref 28.4–34.8)
MCV RBC AUTO: 85.7 FL (ref 82.6–102.9)
MONOCYTES # BLD: 5 % (ref 3–12)
NRBC AUTOMATED: 0 PER 100 WBC
PARTIAL THROMBOPLASTIN TIME: 23.6 SEC (ref 20.5–30.5)
PDW BLD-RTO: 13.1 % (ref 11.8–14.4)
PLATELET # BLD: 246 K/UL (ref 138–453)
PMV BLD AUTO: 9.5 FL (ref 8.1–13.5)
POTASSIUM SERPL-SCNC: 3.7 MMOL/L (ref 3.7–5.3)
PROTHROMBIN TIME: 11 SEC (ref 9.1–12.3)
RBC # BLD: 4.46 M/UL (ref 3.95–5.11)
REASON FOR REJECTION: NORMAL
SEG NEUTROPHILS: 83 % (ref 36–65)
SEGMENTED NEUTROPHILS ABSOLUTE COUNT: 12.92 K/UL (ref 1.5–8.1)
SODIUM BLD-SCNC: 140 MMOL/L (ref 135–144)
WBC # BLD: 15.7 K/UL (ref 3.5–11.3)
ZZ NTE CLEAN UP: ORDERED TEST: NORMAL
ZZ NTE WITH NAME CLEAN UP: SPECIMEN SOURCE: NORMAL

## 2022-12-10 PROCEDURE — 80048 BASIC METABOLIC PNL TOTAL CA: CPT

## 2022-12-10 PROCEDURE — 2580000003 HC RX 258

## 2022-12-10 PROCEDURE — 85610 PROTHROMBIN TIME: CPT

## 2022-12-10 PROCEDURE — 96372 THER/PROPH/DIAG INJ SC/IM: CPT

## 2022-12-10 PROCEDURE — 2580000003 HC RX 258: Performed by: STUDENT IN AN ORGANIZED HEALTH CARE EDUCATION/TRAINING PROGRAM

## 2022-12-10 PROCEDURE — 93005 ELECTROCARDIOGRAM TRACING: CPT

## 2022-12-10 PROCEDURE — 6370000000 HC RX 637 (ALT 250 FOR IP)

## 2022-12-10 PROCEDURE — 1200000000 HC SEMI PRIVATE

## 2022-12-10 PROCEDURE — 99221 1ST HOSP IP/OBS SF/LOW 40: CPT | Performed by: ORTHOPAEDIC SURGERY

## 2022-12-10 PROCEDURE — 73590 X-RAY EXAM OF LOWER LEG: CPT

## 2022-12-10 PROCEDURE — 73552 X-RAY EXAM OF FEMUR 2/>: CPT

## 2022-12-10 PROCEDURE — 6360000002 HC RX W HCPCS: Performed by: EMERGENCY MEDICINE

## 2022-12-10 PROCEDURE — 99285 EMERGENCY DEPT VISIT HI MDM: CPT

## 2022-12-10 PROCEDURE — 73030 X-RAY EXAM OF SHOULDER: CPT

## 2022-12-10 PROCEDURE — 73562 X-RAY EXAM OF KNEE 3: CPT

## 2022-12-10 PROCEDURE — 36415 COLL VENOUS BLD VENIPUNCTURE: CPT

## 2022-12-10 PROCEDURE — 71045 X-RAY EXAM CHEST 1 VIEW: CPT

## 2022-12-10 PROCEDURE — 6370000000 HC RX 637 (ALT 250 FOR IP): Performed by: EMERGENCY MEDICINE

## 2022-12-10 PROCEDURE — 85730 THROMBOPLASTIN TIME PARTIAL: CPT

## 2022-12-10 PROCEDURE — 73560 X-RAY EXAM OF KNEE 1 OR 2: CPT

## 2022-12-10 PROCEDURE — 85025 COMPLETE CBC W/AUTO DIFF WBC: CPT

## 2022-12-10 RX ORDER — ACETAMINOPHEN 500 MG
1000 TABLET ORAL EVERY 8 HOURS SCHEDULED
Status: DISCONTINUED | OUTPATIENT
Start: 2022-12-10 | End: 2022-12-14 | Stop reason: HOSPADM

## 2022-12-10 RX ORDER — SODIUM CHLORIDE 9 MG/ML
INJECTION, SOLUTION INTRAVENOUS PRN
Status: DISCONTINUED | OUTPATIENT
Start: 2022-12-10 | End: 2022-12-13

## 2022-12-10 RX ORDER — SODIUM CHLORIDE 0.9 % (FLUSH) 0.9 %
5-40 SYRINGE (ML) INJECTION PRN
Status: DISCONTINUED | OUTPATIENT
Start: 2022-12-10 | End: 2022-12-14 | Stop reason: HOSPADM

## 2022-12-10 RX ORDER — HYDROCHLOROTHIAZIDE 25 MG/1
25 TABLET ORAL ONCE
Status: COMPLETED | OUTPATIENT
Start: 2022-12-10 | End: 2022-12-10

## 2022-12-10 RX ORDER — ONDANSETRON 4 MG/1
4 TABLET, ORALLY DISINTEGRATING ORAL EVERY 8 HOURS PRN
Status: DISCONTINUED | OUTPATIENT
Start: 2022-12-10 | End: 2022-12-13

## 2022-12-10 RX ORDER — BISACODYL 10 MG
10 SUPPOSITORY, RECTAL RECTAL DAILY PRN
Status: DISCONTINUED | OUTPATIENT
Start: 2022-12-10 | End: 2022-12-13

## 2022-12-10 RX ORDER — SODIUM CHLORIDE 9 MG/ML
1000 INJECTION, SOLUTION INTRAVENOUS CONTINUOUS
Status: DISCONTINUED | OUTPATIENT
Start: 2022-12-11 | End: 2022-12-10

## 2022-12-10 RX ORDER — SODIUM CHLORIDE 0.9 % (FLUSH) 0.9 %
5-40 SYRINGE (ML) INJECTION EVERY 12 HOURS SCHEDULED
Status: DISCONTINUED | OUTPATIENT
Start: 2022-12-10 | End: 2022-12-13

## 2022-12-10 RX ORDER — ONDANSETRON 2 MG/ML
4 INJECTION INTRAMUSCULAR; INTRAVENOUS EVERY 6 HOURS PRN
Status: DISCONTINUED | OUTPATIENT
Start: 2022-12-10 | End: 2022-12-13

## 2022-12-10 RX ORDER — AMLODIPINE BESYLATE 10 MG/1
10 TABLET ORAL ONCE
Status: COMPLETED | OUTPATIENT
Start: 2022-12-10 | End: 2022-12-10

## 2022-12-10 RX ORDER — ACETAMINOPHEN 500 MG
1000 TABLET ORAL ONCE
Status: COMPLETED | OUTPATIENT
Start: 2022-12-10 | End: 2022-12-10

## 2022-12-10 RX ORDER — GABAPENTIN 300 MG/1
300 CAPSULE ORAL 3 TIMES DAILY
Status: DISCONTINUED | OUTPATIENT
Start: 2022-12-10 | End: 2022-12-14 | Stop reason: HOSPADM

## 2022-12-10 RX ORDER — POLYETHYLENE GLYCOL 3350 17 G/17G
17 POWDER, FOR SOLUTION ORAL DAILY PRN
Status: DISCONTINUED | OUTPATIENT
Start: 2022-12-10 | End: 2022-12-13

## 2022-12-10 RX ORDER — OXYCODONE HYDROCHLORIDE 5 MG/1
5 TABLET ORAL EVERY 6 HOURS PRN
Status: DISCONTINUED | OUTPATIENT
Start: 2022-12-10 | End: 2022-12-14

## 2022-12-10 RX ORDER — SODIUM CHLORIDE 9 MG/ML
1000 INJECTION, SOLUTION INTRAVENOUS CONTINUOUS
Status: ACTIVE | OUTPATIENT
Start: 2022-12-11 | End: 2022-12-11

## 2022-12-10 RX ORDER — KETOROLAC TROMETHAMINE 30 MG/ML
30 INJECTION, SOLUTION INTRAMUSCULAR; INTRAVENOUS ONCE
Status: COMPLETED | OUTPATIENT
Start: 2022-12-10 | End: 2022-12-10

## 2022-12-10 RX ORDER — SODIUM PHOSPHATE, DIBASIC AND SODIUM PHOSPHATE, MONOBASIC 7; 19 G/133ML; G/133ML
1 ENEMA RECTAL DAILY PRN
Status: DISCONTINUED | OUTPATIENT
Start: 2022-12-10 | End: 2022-12-13

## 2022-12-10 RX ORDER — METHOCARBAMOL 750 MG/1
750 TABLET, FILM COATED ORAL 3 TIMES DAILY
Status: DISCONTINUED | OUTPATIENT
Start: 2022-12-10 | End: 2022-12-14 | Stop reason: HOSPADM

## 2022-12-10 RX ADMIN — OXYCODONE 5 MG: 5 TABLET ORAL at 20:19

## 2022-12-10 RX ADMIN — KETOROLAC TROMETHAMINE 30 MG: 30 INJECTION, SOLUTION INTRAMUSCULAR; INTRAVENOUS at 10:25

## 2022-12-10 RX ADMIN — ACETAMINOPHEN 1000 MG: 500 TABLET ORAL at 10:25

## 2022-12-10 RX ADMIN — GABAPENTIN 300 MG: 300 CAPSULE ORAL at 23:30

## 2022-12-10 RX ADMIN — SODIUM CHLORIDE, PRESERVATIVE FREE 10 ML: 5 INJECTION INTRAVENOUS at 20:18

## 2022-12-10 RX ADMIN — AMLODIPINE BESYLATE 10 MG: 10 TABLET ORAL at 11:43

## 2022-12-10 RX ADMIN — HYDROCHLOROTHIAZIDE 25 MG: 25 TABLET ORAL at 11:43

## 2022-12-10 RX ADMIN — METHOCARBAMOL TABLETS 750 MG: 750 TABLET, COATED ORAL at 21:30

## 2022-12-10 RX ADMIN — SODIUM CHLORIDE 1000 ML: 9 INJECTION, SOLUTION INTRAVENOUS at 23:44

## 2022-12-10 RX ADMIN — ACETAMINOPHEN 1000 MG: 500 TABLET ORAL at 20:20

## 2022-12-10 ASSESSMENT — PAIN DESCRIPTION - ONSET
ONSET: ON-GOING

## 2022-12-10 ASSESSMENT — PAIN SCALES - GENERAL
PAINLEVEL_OUTOF10: 7
PAINLEVEL_OUTOF10: 7
PAINLEVEL_OUTOF10: 8
PAINLEVEL_OUTOF10: 7
PAINLEVEL_OUTOF10: 7

## 2022-12-10 ASSESSMENT — ENCOUNTER SYMPTOMS
DIARRHEA: 0
ABDOMINAL PAIN: 0
COUGH: 0
COLOR CHANGE: 0
WHEEZING: 0
PHOTOPHOBIA: 0
BACK PAIN: 0
CHEST TIGHTNESS: 0
VOMITING: 0
TROUBLE SWALLOWING: 0
NAUSEA: 0
SHORTNESS OF BREATH: 0
ABDOMINAL DISTENTION: 0

## 2022-12-10 ASSESSMENT — PAIN DESCRIPTION - LOCATION
LOCATION: LEG
LOCATION: LEG
LOCATION: KNEE;LEG

## 2022-12-10 ASSESSMENT — PAIN DESCRIPTION - PAIN TYPE
TYPE: ACUTE PAIN

## 2022-12-10 ASSESSMENT — PAIN - FUNCTIONAL ASSESSMENT
PAIN_FUNCTIONAL_ASSESSMENT: PREVENTS OR INTERFERES SOME ACTIVE ACTIVITIES AND ADLS
PAIN_FUNCTIONAL_ASSESSMENT: PREVENTS OR INTERFERES SOME ACTIVE ACTIVITIES AND ADLS
PAIN_FUNCTIONAL_ASSESSMENT: 0-10
PAIN_FUNCTIONAL_ASSESSMENT: PREVENTS OR INTERFERES SOME ACTIVE ACTIVITIES AND ADLS

## 2022-12-10 ASSESSMENT — PAIN DESCRIPTION - ORIENTATION
ORIENTATION: LEFT
ORIENTATION: LEFT;UPPER;MID
ORIENTATION: LEFT

## 2022-12-10 ASSESSMENT — PAIN DESCRIPTION - FREQUENCY
FREQUENCY: CONTINUOUS

## 2022-12-10 ASSESSMENT — PAIN DESCRIPTION - DESCRIPTORS
DESCRIPTORS: ACHING;STABBING;DISCOMFORT
DESCRIPTORS: ACHING;STABBING
DESCRIPTORS: SHARP

## 2022-12-10 NOTE — H&P
TRAUMA H&P/CONSULT    PATIENT NAME: Bina Birch  YOB: 1960  MEDICAL RECORD NO. [id number]   DATE: 12/10/2022  PRIMARY CARE PHYSICIAN: DEMARCUS Mcdaniels - CNP  PATIENT EVALUATED AT THE REQUEST OF : Alexia    ACTIVATION   []Trauma Alert     [] Trauma Priority     [x]Trauma Consult. Patient Active Problem List   Diagnosis    Syncope    Osteoarthritis    History of CVA (cerebrovascular accident)    Essential hypertension    Vertigo    Chronic allergic rhinitis    Morbid obesity with BMI of 50.0-59.9, adult ([facility] [address]    Mixed hyperlipidemia    Avulsion fracture       IMPRESSION AND PLAN:     23-year-old female with PMH including history of stroke \"in 2010\" with no residual deficits and HTN who originally presented to the ED on 12/10/2022 after a mechanical fall where her purse became wrapped around her walker causing her to fall down 4-5 stairs inside her house. She suffered a left 2.4 cm superiorly displaced avulsion fracture at the upper pole of the patella due to the fall. Patellar fracture  -Orthopedic surgery consulted. Likely OR tomorrow. N.p.o. at midnight  -EKG for pre-op risk stratification  -Pain control  - denies striking head, LOC, vomiting, chest pain, SOB. - bedside FAST negative  - will need PT/OT after surgery    If intracranial hemorrhage is present, is it a:  [] BIG 1  [] BIG 2  [] BIG 3 -- n/a  If chest wall injury: Rib score___n/a    CONSULT SERVICES    [] Neurosurgery     [x] Orthopedic Surgery    [] Cardiothoracic     [] Facial Trauma    [] Plastic Surgery (Burn)    [] Pediatric Surgery     [] Internal Medicine    [] Pulmonary Medicine    [] Geriatrics    [] Other:        HISTORY:     Chief Complaint:  \"I tripped\"    GENERAL DATA  Patient information was obtained from patient and past medical records. History/Exam limitations: none.   Injury Date: 12/10/2022        Transport mode:   []Ambulance      [] Helicopter     []Car       [] Other    SETTING OF TRAUMATIC EVENT Location (e.g., home, farm, industry, street): home  Specific Details of Location (e.g., bedroom, kitchen, garage, highway): stairs    MECHANISM OF INJURY         [x] Fall    []From Standing     []From Height __ Ft     [x]Down __4-5_steps  []Other___  ______________________________________________________    [] Eye protection  []Boots   []Flotation device   []Leather outerwear  []Sports gear []Other:___       HISTORY:     Sal Caba is a female with PMH including history of stroke \"in 2010\" with no residual deficits and HTN who originally presented to the ED on 12/10/2022 after a mechanical fall where her purse became wrapped around her walker causing her to fall down 4-5 stairs inside her house. She suffered a left 2.4 cm superiorly displaced avulsion fracture at the upper pole of the patella due to the fall. Denies loss of consciousness, striking her head, nausea, vomiting. She does tell me that she takes Aggrenox. She also mentions left shoulder pain which was x-rayed and did not reveal an acute fracture or dislocation. Traumatic loss of Consciousness [x]No   []Yes Duration(min)       [] Unknown     Total Fluids Given Prior To Arrival  0  mL    MEDICATIONS:   []  None     []  Information not available due to exam limitations documented above    Prior to Admission medications    Medication Sig Start Date End Date Taking? Authorizing Provider   traMADol (ULTRAM) 50 MG tablet Take 1 tablet by mouth 3 times daily as needed for Pain for up to 30 days.  10/20/22 11/19/22  DEMARCUS Siu CNP   hydrALAZINE (APRESOLINE) 25 MG tablet Take 1 tab twice a day 10/20/22   DEMARCUS Siu CNP   loratadine (CLARITIN) 10 MG tablet TAKE 1 TABLET BY MOUTH EVERY DAY 10/20/22   DEMARCUS Siu CNP   aspirin-dipyridamole (AGGRENOX)  MG per extended release capsule Take 1 capsule by mouth 2 times daily 10/20/22   Emiliano Canela APRN - CNP   meclizine (ANTIVERT) 12.5 MG tablet TAKE 1 TABLET BY MOUTH THREE TIMES A DAY AS NEEDED 10/20/22   DEMARCUS Aguirre - CNP   Misc. Devices MISC Provide handicap placard, expires 5 years from this date 10/20/2027  Medical conditions prevent the ability to walk long distances 10/20/22   DEMARCUS Aguirre - PAULA   hydroCHLOROthiazide (HYDRODIURIL) 25 MG tablet TAKE 1 TABLET BY MOUTH EVERY DAY 8/22/22   Venessa Esquivel APRN - CNP   meloxicam (MOBIC) 15 MG tablet TAKE 1 TABLET BY MOUTH EVERY DAY 8/17/22   Venessa Esquivel APRN - CNP   amLODIPine (NORVASC) 10 MG tablet TAKE 1 TABLET BY MOUTH EVERY DAY 3/8/22   Hydro NascimentoDEMARCUS - CNP   atorvastatin (LIPITOR) 20 MG tablet TAKE 1 TABLET BY MOUTH EVERY DAY 8/10/20   DEMARCUS Aguirre - CNP   aspirin-acetaminophen-caffeine (EXCEDRIN MIGRAINE) 002-426-48 MG per tablet Take 1 tablet by mouth every 6 hours as needed for Pain 3/29/17   Patrica Stage P Blood, DO       ALLERGIES:   []  None    []   Information not available due to exam limitations documented above     Latex, Ace inhibitors, Dye [iodides], and Pcn [penicillins]    PAST MEDICAL/SURGICAL HISTORY: []  None   []   Information not available due to exam limitations documented above      has a past medical history of CVA (cerebral vascular accident) ([facility] [address] Hypertension, Osteoarthritis, and Syncope.  has no past surgical history on file. FAMILY HISTORY   []   Information not available due to exam limitations documented above    family history includes Allergies in her sister; Breast Cancer in her mother; Colon Cancer in her sister; Heart Disease in her brother and sister; Hypertension in her brother, father, mother, and sister; Migraines in her sister; Stroke in her father; Tuberculosis in her sister. SOCIAL HISTORY  []   Information not available due to exam limitations documented above     reports that she has never smoked. She has never used smokeless tobacco.   reports no history of alcohol use.    reports no history of drug use.    Review of Systems:    Review of Systems   Constitutional:  Negative for fatigue and fever. HENT:  Negative for congestion, nosebleeds and trouble swallowing. Eyes:  Negative for photophobia and visual disturbance. Respiratory:  Negative for cough, shortness of breath and wheezing. Cardiovascular:  Negative for chest pain. Gastrointestinal:  Negative for abdominal distention, diarrhea, nausea and vomiting. Musculoskeletal:  Negative for arthralgias, back pain, myalgias, neck pain and neck stiffness. Skin:  Negative for wound. Neurological:  Negative for seizures, syncope, weakness, light-headedness, numbness and headaches. Psychiatric/Behavioral:  The patient is not nervous/anxious. PHYSICAL EXAMINATION:     VITAL SIGNS:   Vitals:    12/10/22 1215   BP: (!) 149/103   Pulse: (!) 121   Resp: 28   Temp:    SpO2:        Physical Exam  Constitutional:       General: She is not in acute distress. Appearance: She is obese. She is not ill-appearing. HENT:      Head: Normocephalic and atraumatic. Right Ear: External ear normal.      Left Ear: External ear normal.      Nose: Nose normal. No congestion. Mouth/Throat:      Mouth: Mucous membranes are moist.      Pharynx: Oropharynx is clear. Eyes:      General: No scleral icterus. Extraocular Movements: Extraocular movements intact. Pupils: Pupils are equal, round, and reactive to light. Cardiovascular:      Rate and Rhythm: Normal rate and regular rhythm. Pulses: Normal pulses. Heart sounds: Normal heart sounds. Pulmonary:      Effort: Pulmonary effort is normal. No respiratory distress. Breath sounds: Normal breath sounds. Abdominal:      General: There is no distension. Palpations: Abdomen is soft. Tenderness: There is no abdominal tenderness. There is no guarding. Musculoskeletal:         General: Tenderness and signs of injury present. No deformity.       Cervical back: Neck supple. Comments: Left LE tenderness to palpation. LLE in brace   Skin:     General: Skin is warm and dry. Neurological:      Mental Status: She is oriented to person, place, and time. Mental status is at baseline. Sensory: No sensory deficit. Motor: No weakness. Psychiatric:         Mood and Affect: Mood normal.         Behavior: Behavior normal.         Thought Content: Thought content normal.         Judgment: Judgment normal.        FOCUSED ABDOMINAL SONOGRAM FOR TRAUMA (FAST): A fair  quality examination was performed by Dr. Orosco and representative images were obtained. [x] No free fluid in the abdomen   [] Free fluid in RUQ   [] Free fluid in LUQ  [] Free fluid in Pelvis  [] Pericardial fluid  [] Other:        RADIOLOGY  XR KNEE LEFT (3 VIEWS)   Final Result   Left shoulder:      1. Mild degenerative changes as above. 2. No acute fracture or dislocation. Left femur:      1. Diffuse osteopenia. No acute osseous abnormality. 2. Tricompartmental osteoarthrosis of the left knee. Left knee:      1. Suspected age-indeterminate fracture at the upper pole of the patella. 2. Tricompartmental osteoarthrosis. Severe degenerative changes of the   medial compartment. Left tib fib:      1. Suspected superiorly displaced avulsion fractures at the upper pole of the   patella superiorly displaced by 2.4 cm. Small suprapatellar joint effusion. 2. Moderate degenerative changes as detailed above. 3. Tibia and fibula appear intact. 4. Mild-to-moderate soft tissue edema of the left leg and ankle. XR FEMUR LEFT (MIN 2 VIEWS)   Final Result   Left shoulder:      1. Mild degenerative changes as above. 2. No acute fracture or dislocation. Left femur:      1. Diffuse osteopenia. No acute osseous abnormality. 2. Tricompartmental osteoarthrosis of the left knee. Left knee:      1. Suspected age-indeterminate fracture at the upper pole of the patella.    2. Tricompartmental osteoarthrosis. Severe degenerative changes of the   medial compartment. Left tib fib:      1. Suspected superiorly displaced avulsion fractures at the upper pole of the   patella superiorly displaced by 2.4 cm. Small suprapatellar joint effusion. 2. Moderate degenerative changes as detailed above. 3. Tibia and fibula appear intact. 4. Mild-to-moderate soft tissue edema of the left leg and ankle. XR TIBIA FIBULA LEFT (2 VIEWS)   Final Result   Left shoulder:      1. Mild degenerative changes as above. 2. No acute fracture or dislocation. Left femur:      1. Diffuse osteopenia. No acute osseous abnormality. 2. Tricompartmental osteoarthrosis of the left knee. Left knee:      1. Suspected age-indeterminate fracture at the upper pole of the patella. 2. Tricompartmental osteoarthrosis. Severe degenerative changes of the   medial compartment. Left tib fib:      1. Suspected superiorly displaced avulsion fractures at the upper pole of the   patella superiorly displaced by 2.4 cm. Small suprapatellar joint effusion. 2. Moderate degenerative changes as detailed above. 3. Tibia and fibula appear intact. 4. Mild-to-moderate soft tissue edema of the left leg and ankle. XR SHOULDER LEFT (MIN 2 VIEWS)   Final Result   Left shoulder:      1. Mild degenerative changes as above. 2. No acute fracture or dislocation. Left femur:      1. Diffuse osteopenia. No acute osseous abnormality. 2. Tricompartmental osteoarthrosis of the left knee. Left knee:      1. Suspected age-indeterminate fracture at the upper pole of the patella. 2. Tricompartmental osteoarthrosis. Severe degenerative changes of the   medial compartment. Left tib fib:      1. Suspected superiorly displaced avulsion fractures at the upper pole of the   patella superiorly displaced by 2.4 cm. Small suprapatellar joint effusion. 2. Moderate degenerative changes as detailed above.    3. Tibia and fibula appear intact. 4. Mild-to-moderate soft tissue edema of the left leg and ankle. XR CHEST PORTABLE    (Results Pending)   XR KNEE LEFT (1-2 VIEWS)    (Results Pending)         LABS  Labs Reviewed   CBC WITH AUTO DIFFERENTIAL - Abnormal; Notable for the following components:       Result Value    WBC 15.7 (*)     Seg Neutrophils 83 (*)     Lymphocytes 11 (*)     Eosinophils % 0 (*)     Immature Granulocytes 1 (*)     Segs Absolute 12.92 (*)     All other components within normal limits   BASIC METABOLIC PANEL   PROTIME-INR   APTT   PREVIOUS SPECIMEN         Suresh Givens DO  12/10/22, 1:49 PM        Attending Note    Patient seen as a trauma consult for patellar fracture sustained in fall from wheelchair. Ortho plans repair  I have reviewed the above TECSS note(s) and I either performed the key elements of the medical history and physical exam or was present with the resident when the key elements of the medical history and physical exam were performed.  I have discussed the findings, established the care plan and recommendations with Resident Layne Benson MD  12/10/2022  7:54 PM

## 2022-12-10 NOTE — ED NOTES
Pt resting on stretcher, attached to monitor, RR even and non labored, call light within reach.       Selene Samuels RN  12/10/22 [id number]

## 2022-12-10 NOTE — ED NOTES
Pt resting on stretcher, attached to monitor, RR even and non labored, call light within reach, family at bedside.       Dash Smith RN  12/10/22 0150

## 2022-12-10 NOTE — ED TRIAGE NOTES
Pt comes to ED from home with c/o fall. Pt states falling about 30 minutes ago, had a misstep, fell four steps, no head injury, no LOC, does take blood thinners, has left knee and shoulder pain. Pt states CVA hx of 14 years ago and hypertension. Pt denies chest pain, SOB, taking pain meds after falling, and lung hx.

## 2022-12-10 NOTE — ED PROVIDER NOTES
[facility] ED  Emergency Department Encounter  EmergencyMedicine Resident     Pt Name:Kelle Puente  MRN: [id number]  Birthdate 1960  Date of evaluation: 12/10/22  PCP:  DEMARCUS Yost CNP    CHIEF COMPLAINT       Chief Complaint   Patient presents with    Fall    Knee Pain       HISTORY OF PRESENT ILLNESS  (Location/Symptom, Timing/Onset, Context/Setting, Quality, Duration, Modifying Factors, Severity.)      Ara Meadows is a 60 y.o. female who presents with left knee pain and left shoulder pain after a fall. Patient describes a mechanical fall where her purse got wrapped around her walker and caused her to fall. Patient denies any surgeries on her knee, does states that she supposed to have a knee replacement but has to lose weight first.  Patient denies any lightheadedness or dizziness at this time. Patient denies hitting her head. Patient is on Aggrenox. Patient describes bruising to the left knee, states that her knee is what hurts the most with 8 out of 10, localized to the knee, difficulty bending it. Patient states that she has no numbness or tingling in the distal extremities. PAST MEDICAL / SURGICAL / SOCIAL / FAMILY HISTORY      has a past medical history of CVA (cerebral vascular accident) ([address] Hypertension, Osteoarthritis, and Syncope. No additional pertinent     has no past surgical history on file.   No additional pertinent    Social History     Socioeconomic History    Marital status: Single     Spouse name: Not on file    Number of children: Not on file    Years of education: Not on file    Highest education level: Not on file   Occupational History    Not on file   Tobacco Use    Smoking status: Never    Smokeless tobacco: Never   Vaping Use    Vaping Use: Never used   Substance and Sexual Activity    Alcohol use: No    Drug use: No    Sexual activity: Not on file   Other Topics Concern    Not on file   Social History Narrative    Not on file     Social Determinants of Health     Financial Resource Strain: Medium Risk    Difficulty of Paying Living Expenses: Somewhat hard   Food Insecurity: Food Insecurity Present    Worried About Running Out of Food in the Last Year: Sometimes true    Ran Out of Food in the Last Year: Never true   Transportation Needs: Not on file   Physical Activity: Insufficiently Active    Days of Exercise per Week: 7 days    Minutes of Exercise per Session: 20 min   Stress: Not on file   Social Connections: Not on file   Intimate Partner Violence: Not on file   Housing Stability: Not on file       Family History   Problem Relation Age of Onset    Hypertension Mother     Breast Cancer Mother     Hypertension Father     Stroke Father     Tuberculosis Sister     Hypertension Sister     Colon Cancer Sister     Heart Disease Sister     Allergies Sister     Migraines Sister     Hypertension Brother     Heart Disease Brother        Allergies:  Latex, Ace inhibitors, Dye [iodides], and Pcn [penicillins]    Home Medications:  Prior to Admission medications    Medication Sig Start Date End Date Taking? Authorizing Provider   traMADol (ULTRAM) 50 MG tablet Take 1 tablet by mouth 3 times daily as needed for Pain for up to 30 days. 10/20/22 11/19/22  Anibal Delay, APRN - CNP   hydrALAZINE (APRESOLINE) 25 MG tablet Take 1 tab twice a day 10/20/22   Anibal Delay, APRN - CNP   loratadine (CLARITIN) 10 MG tablet TAKE 1 TABLET BY MOUTH EVERY DAY 10/20/22   Anibla Delay, APRN - CNP   aspirin-dipyridamole (AGGRENOX)  MG per extended release capsule Take 1 capsule by mouth 2 times daily 10/20/22   Anibal Delay, APRN - CNP   meclizine (ANTIVERT) 12.5 MG tablet TAKE 1 TABLET BY MOUTH THREE TIMES A DAY AS NEEDED 10/20/22   Anibal Delay, APRN - CNP   Misc.  Devices MISC Provide handicap placard, expires 5 years from this date 10/20/2027  Medical conditions prevent the ability to walk long distances 10/20/22   Anibal Delay, APRN - CNP hydroCHLOROthiazide (HYDRODIURIL) 25 MG tablet TAKE 1 TABLET BY MOUTH EVERY DAY 8/22/22   [facility], APRN - CNP   meloxicam (MOBIC) 15 MG tablet TAKE 1 TABLET BY MOUTH EVERY DAY 8/17/22   [facility], APRN - CNP   amLODIPine (NORVASC) 10 MG tablet TAKE 1 TABLET BY MOUTH EVERY DAY 3/8/22   Donnie Weber, APRN - CNP   atorvastatin (LIPITOR) 20 MG tablet TAKE 1 TABLET BY MOUTH EVERY DAY 8/10/20   [facility], APRN - CNP   aspirin-acetaminophen-caffeine (EXCEDRIN MIGRAINE) 051-035-02 MG per tablet Take 1 tablet by mouth every 6 hours as needed for Pain 3/29/17   Vidya POLANCO Blood, DO       REVIEW OF SYSTEMS    (2-9 systems for level 4, 10 or more for level 5)      Review of Systems   Respiratory:  Negative for chest tightness and shortness of breath. Cardiovascular:  Positive for leg swelling. Negative for chest pain. Gastrointestinal:  Negative for abdominal pain and vomiting. Musculoskeletal:  Positive for arthralgias (left shoulder and left knee pain). Skin:  Negative for color change. Neurological:  Negative for dizziness, weakness, light-headedness, numbness and headaches. PHYSICAL EXAM   (up to 7 for level 4, 8 or more for level 5)      INITIAL VITALS:   BP (!) 149/103   Pulse (!) 121   Temp 98.3 °F (36.8 °C) (Oral)   Resp 28   Ht 5' 2\" (1.575 m)   Wt 285 lb (129.3 kg)   SpO2 93%   BMI 52.13 kg/m²     Physical Exam  Constitutional:       Appearance: Normal appearance. HENT:      Head: Normocephalic and atraumatic. Mouth/Throat:      Mouth: Mucous membranes are moist.      Pharynx: Oropharynx is clear. Eyes:      Extraocular Movements: Extraocular movements intact. Conjunctiva/sclera: Conjunctivae normal.   Cardiovascular:      Rate and Rhythm: Normal rate and regular rhythm. Pulses: Normal pulses. Pulmonary:      Effort: Pulmonary effort is normal.      Breath sounds: Normal breath sounds.    Musculoskeletal:      Comments: Decreased range of motion of the left knee, unable to do flexion extension secondary to pain. Patient with multiple adipose tissue with difficult evaluation of bone structure, tenderness to palpation over the patella. Patient has distal pulses sensation and motor intact of the left lower extremity. Patient has normal range of motion of the left upper extremity, has some tenderness palpation of the shoulder, distal pulses sensation and motor intact of the left upper extremity. Skin:     General: Skin is warm and dry. Findings: No rash (On exposed skin). Neurological:      General: No focal deficit present. Mental Status: She is alert and oriented to person, place, and time.    Psychiatric:         Mood and Affect: Mood normal.         Behavior: Behavior normal.       DIFFERENTIAL  DIAGNOSIS     PLAN (LABS / IMAGING / EKG):  Orders Placed This Encounter   Procedures    XR KNEE LEFT (3 VIEWS)    XR FEMUR LEFT (MIN 2 VIEWS)    XR TIBIA FIBULA LEFT (2 VIEWS)    XR SHOULDER LEFT (MIN 2 VIEWS)    XR CHEST PORTABLE    XR KNEE LEFT (1-2 VIEWS)    Basic Metabolic Panel w/ Reflex to MG    CBC with Auto Differential    CBC with Auto Differential    Basic Metabolic Panel    Protime-INR    APTT    PREVIOUS SPECIMEN    Diet NPO    Vital signs per unit routine    Notify patient's primary care physician of admission    Place intermittent pneumatic compression device    Telemetry monitoring - 48 hour duration    Strict Bedrest    Daily weights    Intake and output    Full Code    Inpatient consult to Orthopedic Surgery    Inpatient consult to Trauma Surgery    Initiate Oxygen Therapy Protocol    EKG 12 Lead    ADMIT TO INPATIENT       MEDICATIONS ORDERED:  Orders Placed This Encounter   Medications    acetaminophen (TYLENOL) tablet 1,000 mg    ketorolac (TORADOL) injection 30 mg    amLODIPine (NORVASC) tablet 10 mg    hydroCHLOROthiazide (HYDRODIURIL) tablet 25 mg    sodium chloride flush 0.9 % injection 5-40 mL    sodium chloride flush 0.9 % injection 5-40 mL    0.9 % sodium chloride infusion    OR Linked Order Group     ondansetron (ZOFRAN-ODT) disintegrating tablet 4 mg     ondansetron (ZOFRAN) injection 4 mg    polyethylene glycol (GLYCOLAX) packet 17 g    bisacodyl (DULCOLAX) suppository 10 mg    sodium phosphate (FLEET) rectal enema 1 enema       DDX: Fracture versus sprain versus ecchymosis/bruising of the left knee. DIAGNOSTIC RESULTS / EMERGENCY DEPARTMENT COURSE / MDM   LAB RESULTS:  Results for orders placed or performed during the hospital encounter of 12/10/22   CBC with Auto Differential   Result Value Ref Range    WBC 15.7 (H) 3.5 - 11.3 k/uL    RBC 4.46 3.95 - 5.11 m/uL    Hemoglobin 12.1 11.9 - 15.1 g/dL    Hematocrit 38.2 36.3 - 47.1 %    MCV 85.7 82.6 - 102.9 fL    MCH 27.1 25.2 - 33.5 pg    MCHC 31.7 28.4 - 34.8 g/dL    RDW 13.1 11.8 - 14.4 %    Platelets 259 884 - 983 k/uL    MPV 9.5 8.1 - 13.5 fL    NRBC Automated 0.0 0.0 per 100 WBC    Seg Neutrophils 83 (H) 36 - 65 %    Lymphocytes 11 (L) 24 - 43 %    Monocytes 5 3 - 12 %    Eosinophils % 0 (L) 1 - 4 %    Basophils 0 0 - 2 %    Immature Granulocytes 1 (H) 0 %    Segs Absolute 12.92 (H) 1.50 - 8.10 k/uL    Absolute Lymph # 1.78 1.10 - 3.70 k/uL    Absolute Mono # 0.77 0.10 - 1.20 k/uL    Absolute Eos # <0.03 0.00 - 0.44 k/uL    Basophils Absolute 0.07 0.00 - 0.20 k/uL    Absolute Immature Granulocyte 0.10 0.00 - 0.30 k/uL   Basic Metabolic Panel   Result Value Ref Range    Glucose 96 70 - 99 mg/dL    BUN 15 8 - 23 mg/dL    Creatinine 0.66 0.50 - 0.90 mg/dL    Est, Glom Filt Rate >60 >60 mL/min/1.73m2    Calcium 9.0 8.6 - 10.4 mg/dL    Sodium 140 135 - 144 mmol/L    Potassium 3.7 3.7 - 5.3 mmol/L    Chloride 103 98 - 107 mmol/L    CO2 25 20 - 31 mmol/L    Anion Gap 12 9 - 17 mmol/L       RADIOLOGY:  XR KNEE LEFT (3 VIEWS)   Final Result   Left shoulder:      1. Mild degenerative changes as above. 2. No acute fracture or dislocation. Left femur:      1. Diffuse osteopenia.   No acute osseous abnormality. 2. Tricompartmental osteoarthrosis of the left knee. Left knee:      1. Suspected age-indeterminate fracture at the upper pole of the patella. 2. Tricompartmental osteoarthrosis. Severe degenerative changes of the   medial compartment. Left tib fib:      1. Suspected superiorly displaced avulsion fractures at the upper pole of the   patella superiorly displaced by 2.4 cm. Small suprapatellar joint effusion. 2. Moderate degenerative changes as detailed above. 3. Tibia and fibula appear intact. 4. Mild-to-moderate soft tissue edema of the left leg and ankle. XR FEMUR LEFT (MIN 2 VIEWS)   Final Result   Left shoulder:      1. Mild degenerative changes as above. 2. No acute fracture or dislocation. Left femur:      1. Diffuse osteopenia. No acute osseous abnormality. 2. Tricompartmental osteoarthrosis of the left knee. Left knee:      1. Suspected age-indeterminate fracture at the upper pole of the patella. 2. Tricompartmental osteoarthrosis. Severe degenerative changes of the   medial compartment. Left tib fib:      1. Suspected superiorly displaced avulsion fractures at the upper pole of the   patella superiorly displaced by 2.4 cm. Small suprapatellar joint effusion. 2. Moderate degenerative changes as detailed above. 3. Tibia and fibula appear intact. 4. Mild-to-moderate soft tissue edema of the left leg and ankle. XR TIBIA FIBULA LEFT (2 VIEWS)   Final Result   Left shoulder:      1. Mild degenerative changes as above. 2. No acute fracture or dislocation. Left femur:      1. Diffuse osteopenia. No acute osseous abnormality. 2. Tricompartmental osteoarthrosis of the left knee. Left knee:      1. Suspected age-indeterminate fracture at the upper pole of the patella. 2. Tricompartmental osteoarthrosis. Severe degenerative changes of the   medial compartment. Left tib fib:      1.  Suspected superiorly displaced avulsion fractures at the upper pole of the   patella superiorly displaced by 2.4 cm. Small suprapatellar joint effusion. 2. Moderate degenerative changes as detailed above. 3. Tibia and fibula appear intact. 4. Mild-to-moderate soft tissue edema of the left leg and ankle. XR SHOULDER LEFT (MIN 2 VIEWS)   Final Result   Left shoulder:      1. Mild degenerative changes as above. 2. No acute fracture or dislocation. Left femur:      1. Diffuse osteopenia. No acute osseous abnormality. 2. Tricompartmental osteoarthrosis of the left knee. Left knee:      1. Suspected age-indeterminate fracture at the upper pole of the patella. 2. Tricompartmental osteoarthrosis. Severe degenerative changes of the   medial compartment. Left tib fib:      1. Suspected superiorly displaced avulsion fractures at the upper pole of the   patella superiorly displaced by 2.4 cm. Small suprapatellar joint effusion. 2. Moderate degenerative changes as detailed above. 3. Tibia and fibula appear intact. 4. Mild-to-moderate soft tissue edema of the left leg and ankle. XR CHEST PORTABLE    (Results Pending)   XR KNEE LEFT (1-2 VIEWS)    (Results Pending)          EMERGENCY DEPARTMENT COURSE:  ED Course as of 12/10/22 1353   Sat Dec 10, 2022   1136 X-ray results discussed with patient, orthopedics consulted due to having avulsion fracture of the patella and with this being a unique fracture with concern for quadriceps tendon involvement will discuss with orthopedics. [CR]   9108 Discussed patient with orthopedics, they recommend additional imaging but with further discussion with their attending they recommend operative management and plan for OR tomorrow. Patient was informed of this. Trauma team consulted for evaluation and admission of patient.  [CR]      ED Course User Index  [CR] Katt Campos, DO       PROCEDURES:  None    CONSULTS:  IP CONSULT TO ORTHOPEDIC SURGERY  IP CONSULT TO TRAUMA SURGERY    MEDICAL DECISION MAKING:  Patient presenting with fall, denies hitting her head, denies any neck pain. Patient is on blood thinners  Acting appropriately, stable vitals. Patient is hypertensive and tachycardic. Patient was given her home blood pressure medications as she has not taken them yet today. Patient tachycardia most likely secondary to pain. Patient was given Tylenol and Toradol with some improvement in her pain, patient was offered Roxicodone and declined at this time. X-rays demonstrated concerns for superior patellar fracture. Orthopedics evaluated patient was taken to the OR tomorrow. Trauma team consulted for management and admission. Trauma team admitted patient after further work-up including EKg for surgical risk stratification. CRITICAL CARE:  Please see attending note    FINAL IMPRESSION      1. Closed displaced fracture of left patella, unspecified fracture morphology, initial encounter          DISPOSITION / Mili q. 291 Admitted 12/10/2022 12:58:27 PM      PATIENT REFERRED TO:  No follow-up provider specified.     DISCHARGE MEDICATIONS:  New Prescriptions    No medications on file       Benito Crigler, [address], DO  Emergency Medicine Resident    (Please note that portions of thisnote were completed with a voice recognition program.  Efforts were made to edit the dictations but occasionally words are mis-transcribed.)        [facility], DO  Resident  12/10/22 3949

## 2022-12-10 NOTE — ED NOTES
Report given to Shara Bergman RN 1C; all questions addressed; writer RN informs Elif Mcclellan the need of blue top collection for aPTT.       Santos Body, RN  12/10/22 0377

## 2022-12-10 NOTE — ED NOTES
The following labs were labeled with appropriate pt sticker and tubed to lab:     [x] Blue     [] Lavender   [] on ice  [] Green/yellow  [] Green/black [] on ice  [] Symone Rodney  [] on ice  [] Yellow  [] Red  [] Type/ Screen  [] ABG  [] VBG    [] COVID-19 swab    [] Rapid  [] PCR  [] Flu swab  [] Peds Viral Panel     [] Urine Sample  [] Fecal Sample  [] Pelvic Cultures  [] Blood Cultures  [] X 2  [] STREP Cultures     Carma Cogan, RN  12/10/22 [id number]

## 2022-12-10 NOTE — ED NOTES
Pt resting on stretcher, attached to monitor, RR even and non labored, call light within reach.      Sherald Kehr, RN  12/10/22 4769

## 2022-12-10 NOTE — PROGRESS NOTES
[facility] CARE DEPARTMENT - [address]     Emergency/Trauma Note    PATIENT NAME: Sandra Brennan    Shift date: 12/10/2022  Shift day: Saturday   Shift # 1    Room # 20/20     Name: Sandra Brennan            Age: 60 y.o. Gender: female          [place]ism: Tenriism   Place of Shinto:     Trauma/Incident type: Adult Trauma Consult  Admit Date & Time: 12/10/2022  9:48 AM  TRAUMA NAME: None    ADVANCE DIRECTIVES IN CHART? No    NAME OF DECISION MAKER: Unknown    RELATIONSHIP OF DECISION MAKER TO PATIENT:     PATIENT/EVENT DESCRIPTION:  Sandra Brennan is a 60 y.o. female who arrived ED as adult trauma consult. Per report, patient took a fall. Patient to be admitted to 20/20. SPIRITUAL ASSESSMENT-INTERVENTION-OUTCOME:  Patient was raised Tenriism and very receptive to spiritual care. Family was present at the time. Patient was having a rough time but seemed to have confidence in the medical staff.  offered support, prayed with patient and family and reassured them that they were in good hands. Patient and family expressed appreciation for the blessing and spiritual support they received. PATIENT BELONGINGS:  No belongings noted    ANY BELONGINGS OF SIGNIFICANT VALUE NOTED:  Unknown    REGISTRATION STAFF NOTIFIED? Yes    WHAT IS YOUR SPIRITUAL CARE PLAN FOR THIS PATIENT?:  Follow up visits recommended for ongoing assessment of patient's condition and for more prayers and support. Electronically signed by Fr. Ashlee Mendieta on 12/10/2022 at 1:00 PM.  [facility]  [phone]

## 2022-12-10 NOTE — ED NOTES
Pt resting on stretcher, attached to monitor, RR even and non labored, call light within reach, family at bedside.       Dash Smith RN  12/10/22 0932

## 2022-12-10 NOTE — ED NOTES
Pt resting on stretcher, attached to monitor, RR even and non labored, call light within reach, family at bedside.       Andrea Whitfield RN  12/10/22 [id number]

## 2022-12-10 NOTE — ED NOTES
Xray at bedside   Writer attempts to help position pt. For imaging, pt.  Unable to tolerate  Dr. Parveen Carlton notified      Willis Vital RN  12/10/22 4270

## 2022-12-10 NOTE — CONSULTS
Orthopedic Surgery Consult   Dr. Padma Velasco                   CC/Reason for consult: Left knee pain     HPI:      The patient is a 60 y.o. female who presents to [facility] via EMS after sustaining a fall earlier today. Patient states that she uses a cane at baseline for ambulation and was walking down the stairs backwards, as she always does, with her cane. She states that she mispositioned her cane and ended up losing her balance and fell directly onto her left knee. She had immediate pain after the fall and was unable to ambulate. EMS was called and she was transported to the hospital for further evaluation. On evaluation at the emergency department, the patient noted left knee pain, but also had left shoulder pain as well, although she does not believe there is anything serious going on in the left shoulder. She denies any numbness or tingling. She did not hit her head when she fell. She did not lose consciousness. She states that she has never injured the left knee before but does have a history of osteoarthritis in the left knee and states that that she has had this since she was 11years old. She denies taking any sort of chemical anticoagulation. She uses a cane at baseline for ambulation. She has no prior orthopedic injuries or complaints although she does state that she has received corticosteroid injections into the left knee in the past.  Past medical history is significant for hypertension and prior history of stroke. She is retired and no longer works. Past Medical History:    Past Medical History:   Diagnosis Date    CVA (cerebral vascular accident) ([facility] [address]     Hypertension     Osteoarthritis     knee    Syncope        Past Surgical History:    History reviewed. No pertinent surgical history. Medications Prior to Admission:   Prior to Admission medications    Medication Sig Start Date End Date Taking?  Authorizing Provider   traMADol (ULTRAM) 50 MG tablet Take 1 tablet by mouth 3 times daily as needed for Pain for up to 30 days. 10/20/22 11/19/22  Green Chiquis, APRN - CNP   hydrALAZINE (APRESOLINE) 25 MG tablet Take 1 tab twice a day 10/20/22   Green Chiquis, APRN - CNP   loratadine (CLARITIN) 10 MG tablet TAKE 1 TABLET BY MOUTH EVERY DAY 10/20/22   Green Chiquis, APRN - CNP   aspirin-dipyridamole (AGGRENOX)  MG per extended release capsule Take 1 capsule by mouth 2 times daily 10/20/22   Green Chiquis, APRN - CNP   meclizine (ANTIVERT) 12.5 MG tablet TAKE 1 TABLET BY MOUTH THREE TIMES A DAY AS NEEDED 10/20/22   Green Chiquis APRN - CNP   Misc.  Devices MISC Provide handicap placard, expires 5 years from this date 10/20/2027  Medical conditions prevent the ability to walk long distances 10/20/22   Green Chiquis, APRN - CNP   hydroCHLOROthiazide (HYDRODIURIL) 25 MG tablet TAKE 1 TABLET BY MOUTH EVERY DAY 8/22/22   Green Chiquis, APRN - CNP   meloxicam (MOBIC) 15 MG tablet TAKE 1 TABLET BY MOUTH EVERY DAY 8/17/22   Green Chiquis, APRN - CNP   amLODIPine (NORVASC) 10 MG tablet TAKE 1 TABLET BY MOUTH EVERY DAY 3/8/22   Aiyana Coleman, APRN - CNP   atorvastatin (LIPITOR) 20 MG tablet TAKE 1 TABLET BY MOUTH EVERY DAY 8/10/20   Green Chiquis, APRN - CNP   aspirin-acetaminophen-caffeine (EXCEDRIN MIGRAINE) 380-434-63 MG per tablet Take 1 tablet by mouth every 6 hours as needed for Pain 3/29/17   Britta More P Blood, DO       Allergies:    Latex, Ace inhibitors, Dye [iodides], and Pcn [penicillins]    Social History:   Social History     Socioeconomic History    Marital status: Single     Spouse name: None    Number of children: None    Years of education: None    Highest education level: None   Tobacco Use    Smoking status: Never    Smokeless tobacco: Never   Vaping Use    Vaping Use: Never used   Substance and Sexual Activity    Alcohol use: No    Drug use: No     Social Determinants of Health     Financial Resource Strain: Medium Risk Difficulty of Paying Living Expenses: Somewhat hard   Food Insecurity: Food Insecurity Present    Worried About Running Out of Food in the Last Year: Sometimes true    Ran Out of Food in the Last Year: Never true   Physical Activity: Insufficiently Active    Days of Exercise per Week: 7 days    Minutes of Exercise per Session: 20 min       Family History:  Family History   Problem Relation Age of Onset    Hypertension Mother     Breast Cancer Mother     Hypertension Father     Stroke Father     Tuberculosis Sister     Hypertension Sister     Colon Cancer Sister     Heart Disease Sister     Allergies Sister     Migraines Sister     Hypertension Brother     Heart Disease Brother        REVIEW OF SYSTEMS:    General: No fevers/chills. CV: No chest pain. Resp: No SOB. MSK: Pain in left knee, left shoulder  Neuro: No numbness, tingling, weakness  10 point ROS negative other than stated above    PHYSICAL EXAM:  BP (!) 183/145   Pulse 98   Temp 98.3 °F (36.8 °C) (Oral)   Resp 16   Ht 5' 2\" (1.575 m)   Wt 285 lb (129.3 kg)   SpO2 95%   BMI 52.13 kg/m²   Gen: AAOx3, NAD, cooperative     Head: normocephalic atraumatic     Chest: Non labored breathing, symmetrical chest expanison     Heart: Regular rate    LUE: Mild tenderness to palpation over posterior shoulder. No instability. Full range of motion forward flexion/abduction/internal/external rotation. No ecchymoses, abrasions, deformity, or lacerations. Skin intact. No pain at elbow/wrist/digits. Compartments soft and compressible. Ulnar/Median/AIN/Radial/PIN gross motor intact. Axillary/Median/Radial/Ulnar nerve SILT. Hand and fingers warm and well-perfused w/ BCR; radial pulse 2+. LLE: Ecchymoses present to anterior aspect of left knee with associated tenderness to palpation. No lacerations or abrasions. Unable to perform straight leg raise. In attempted straight leg raise, only was able to flex the knee to approximately 15 degrees.  Unable to tolerate more than 15 degrees passive flexion at the knee. With passive straight leg is performed, patient was unable to hold leg against gravity. Skin intact. Non tender along medial/lateral joint line. Unable to obtain full ligamentous evaluation secondary to patient discomfort. Full AROM without pain ankle/toes. Negative log roll. Compartments soft and compressible. Sural, saphenous, superificial/deep peroneal, and plantar nerve distribution SILT. Foot and toes warm and well-perfused w/ BCR; DP/PT pulses 2+. LABS:  No results for input(s): WBC, HGB, HCT, PLT, INR, PTT, NA, K, BUN, CREATININE, GLUCOSE, SEDRATE, CRP in the last 72 hours. Invalid input(s): PT     Radiology:   X-rays of left knee demonstrating a superior pole of patella avulsion fracture with small knee effusion. There is no evidence of other fractures in the knee or other osseous abnormalities. A/P: 60 y.o. female being seen after a fall from standing height with the following:   - Left superior pole of patella avulsion fracture   - Quadriceps tendon avulsion       -To OR 12/11 with Dr. Jessica Ovalles for quadriceps tendon repair   -Would appreciate risk stratification note from primary team   -NPO at midnight   -Ancef on call to OR   -NWB LLE   -Knee immobilizer on   -Consent obtained and operative site marked  -Trauma team primary   -Pain control per primary team discretion   -Ice and elevation for pain/swelling  -DVT ppx: EPC.  Please hold chemical anticoagulation until POD#1   -Please page Ortho with any questions or concerns      Chris Montana DO  PGY-3 Orthopedic Surgery  11:57 AM 12/10/2022

## 2022-12-10 NOTE — ED NOTES
Pt given fresh icepack by writer. Pt verbalized no other needs at this time.      Abelardo Paz RN  12/10/22 [id number]

## 2022-12-10 NOTE — ED NOTES
Pt resting on stretcher, attached to monitor, RR even and non labored, call light within reach.      Ramon Hooks RN  12/10/22 1366

## 2022-12-10 NOTE — ED PROVIDER NOTES
[id number]     Emergency Department     Faculty Attestation    I performed a history and physical examination of the patient and discussed management with the resident. I reviewed the residents note and agree with the documented findings including all diagnostic interpretations and plan of care. Any areas of disagreement are noted on the chart. I was personally present for the key portions of any procedures. I have documented in the chart those procedures where I was not present during the key portions. I have reviewed the emergency nurses triage note. I agree with the chief complaint, past medical history, past surgical history, allergies, medications, social and family history as documented unless otherwise noted below. Documentation of the HPI, Physical Exam and Medical Decision Making performed by scribmary beth is based on my personal performance of the HPI, PE and MDM. For Physician Assistant/ Nurse Practitioner cases/documentation I have personally evaluated this patient and have completed at least one if not all key elements of the E/M (history, physical exam, and MDM). Additional findings are as noted. Primary Care Physician: Henrry Sim, APRN - CNP    History: This is a 60 y.o. female who presents to the Emergency Department with complaint of fall. Fall downstairs. She is on Aggrenox. Did not strike head no loss consciousness. Complaining of knee and leg pain on the left as well as left shoulder pain. Physical:     height is 5' 2\" (1.575 m) and weight is 285 lb (129.3 kg). Her oral temperature is 98.3 °F (36.8 °C). Her blood pressure is 195/117 (abnormal) and her pulse is 103 (abnormal). Her respiration is 20 and oxygen saturation is 97%.    60 y.o. female no acute distress but appears uncomfortable, bruising over the left patella and some tenderness on palpation.   Left shoulder shows some tenderness but no significant swelling or deformity.     Impression: Fall, musculoskeletal injury    Plan: X-rays, analgesia, reassess      Elisabeth Heller MD, Chiqui Garza  Attending Emergency Physician        Donal Elizabeth MD  12/10/22 1051

## 2022-12-10 NOTE — ED NOTES
Ortho at bedside. Dr Rock Reeves placed pt on left knee immobilizer.       Rika Melara RN  12/10/22 [id number]

## 2022-12-11 ENCOUNTER — ANESTHESIA (OUTPATIENT)
Dept: OPERATING ROOM | Age: 62
DRG: 488 | End: 2022-12-11
Payer: MEDICARE

## 2022-12-11 ENCOUNTER — ANESTHESIA EVENT (OUTPATIENT)
Dept: OPERATING ROOM | Age: 62
DRG: 488 | End: 2022-12-11
Payer: MEDICARE

## 2022-12-11 LAB
ABSOLUTE EOS #: 0.07 K/UL (ref 0–0.44)
ABSOLUTE IMMATURE GRANULOCYTE: <0.03 K/UL (ref 0–0.3)
ABSOLUTE LYMPH #: 1.93 K/UL (ref 1.1–3.7)
ABSOLUTE MONO #: 0.59 K/UL (ref 0.1–1.2)
ANION GAP SERPL CALCULATED.3IONS-SCNC: 12 MMOL/L (ref 9–17)
BASOPHILS # BLD: 1 % (ref 0–2)
BASOPHILS ABSOLUTE: 0.05 K/UL (ref 0–0.2)
BUN BLDV-MCNC: 13 MG/DL (ref 8–23)
CALCIUM SERPL-MCNC: 8.4 MG/DL (ref 8.6–10.4)
CHLORIDE BLD-SCNC: 101 MMOL/L (ref 98–107)
CO2: 25 MMOL/L (ref 20–31)
CREAT SERPL-MCNC: 0.63 MG/DL (ref 0.5–0.9)
EOSINOPHILS RELATIVE PERCENT: 1 % (ref 1–4)
GFR SERPL CREATININE-BSD FRML MDRD: >60 ML/MIN/1.73M2
GLUCOSE BLD-MCNC: 96 MG/DL (ref 70–99)
HCT VFR BLD CALC: 30.9 % (ref 36.3–47.1)
HEMOGLOBIN: 9.8 G/DL (ref 11.9–15.1)
IMMATURE GRANULOCYTES: 0 %
LYMPHOCYTES # BLD: 29 % (ref 24–43)
MCH RBC QN AUTO: 27.3 PG (ref 25.2–33.5)
MCHC RBC AUTO-ENTMCNC: 31.7 G/DL (ref 28.4–34.8)
MCV RBC AUTO: 86.1 FL (ref 82.6–102.9)
MONOCYTES # BLD: 9 % (ref 3–12)
NRBC AUTOMATED: 0 PER 100 WBC
PDW BLD-RTO: 13.2 % (ref 11.8–14.4)
PLATELET # BLD: 212 K/UL (ref 138–453)
PMV BLD AUTO: 9.2 FL (ref 8.1–13.5)
POTASSIUM SERPL-SCNC: 3.7 MMOL/L (ref 3.7–5.3)
RBC # BLD: 3.59 M/UL (ref 3.95–5.11)
SEG NEUTROPHILS: 60 % (ref 36–65)
SEGMENTED NEUTROPHILS ABSOLUTE COUNT: 3.98 K/UL (ref 1.5–8.1)
SODIUM BLD-SCNC: 138 MMOL/L (ref 135–144)
WBC # BLD: 6.6 K/UL (ref 3.5–11.3)

## 2022-12-11 PROCEDURE — 36415 COLL VENOUS BLD VENIPUNCTURE: CPT

## 2022-12-11 PROCEDURE — 6370000000 HC RX 637 (ALT 250 FOR IP): Performed by: STUDENT IN AN ORGANIZED HEALTH CARE EDUCATION/TRAINING PROGRAM

## 2022-12-11 PROCEDURE — 80048 BASIC METABOLIC PNL TOTAL CA: CPT

## 2022-12-11 PROCEDURE — 93306 TTE W/DOPPLER COMPLETE: CPT

## 2022-12-11 PROCEDURE — 2580000003 HC RX 258

## 2022-12-11 PROCEDURE — 6370000000 HC RX 637 (ALT 250 FOR IP)

## 2022-12-11 PROCEDURE — 85025 COMPLETE CBC W/AUTO DIFF WBC: CPT

## 2022-12-11 PROCEDURE — 2580000003 HC RX 258: Performed by: STUDENT IN AN ORGANIZED HEALTH CARE EDUCATION/TRAINING PROGRAM

## 2022-12-11 PROCEDURE — 1200000000 HC SEMI PRIVATE

## 2022-12-11 RX ORDER — SODIUM CHLORIDE 9 MG/ML
1000 INJECTION, SOLUTION INTRAVENOUS CONTINUOUS
Status: ACTIVE | OUTPATIENT
Start: 2022-12-12 | End: 2022-12-12

## 2022-12-11 RX ORDER — AMLODIPINE BESYLATE 10 MG/1
10 TABLET ORAL DAILY
Status: DISCONTINUED | OUTPATIENT
Start: 2022-12-11 | End: 2022-12-14 | Stop reason: HOSPADM

## 2022-12-11 RX ORDER — CARVEDILOL 6.25 MG/1
6.25 TABLET ORAL 2 TIMES DAILY WITH MEALS
Status: DISCONTINUED | OUTPATIENT
Start: 2022-12-11 | End: 2022-12-14 | Stop reason: HOSPADM

## 2022-12-11 RX ORDER — ATORVASTATIN CALCIUM 20 MG/1
20 TABLET, FILM COATED ORAL DAILY
Status: DISCONTINUED | OUTPATIENT
Start: 2022-12-11 | End: 2022-12-14 | Stop reason: HOSPADM

## 2022-12-11 RX ORDER — MECLIZINE HCL 12.5 MG/1
25 TABLET ORAL 3 TIMES DAILY PRN
Status: DISCONTINUED | OUTPATIENT
Start: 2022-12-11 | End: 2022-12-13

## 2022-12-11 RX ORDER — ATORVASTATIN CALCIUM 20 MG/1
20 TABLET, FILM COATED ORAL NIGHTLY
Status: DISCONTINUED | OUTPATIENT
Start: 2022-12-11 | End: 2022-12-11

## 2022-12-11 RX ORDER — SODIUM CHLORIDE 9 MG/ML
INJECTION, SOLUTION INTRAVENOUS PRN
Status: DISCONTINUED | OUTPATIENT
Start: 2022-12-11 | End: 2022-12-11

## 2022-12-11 RX ORDER — HYDRALAZINE HYDROCHLORIDE 25 MG/1
25 TABLET, FILM COATED ORAL 2 TIMES DAILY
Status: DISCONTINUED | OUTPATIENT
Start: 2022-12-11 | End: 2022-12-14 | Stop reason: HOSPADM

## 2022-12-11 RX ADMIN — METHOCARBAMOL TABLETS 750 MG: 750 TABLET, COATED ORAL at 20:28

## 2022-12-11 RX ADMIN — CARVEDILOL 6.25 MG: 6.25 TABLET, FILM COATED ORAL at 12:02

## 2022-12-11 RX ADMIN — CARVEDILOL 6.25 MG: 6.25 TABLET, FILM COATED ORAL at 17:22

## 2022-12-11 RX ADMIN — AMLODIPINE BESYLATE 10 MG: 10 TABLET ORAL at 13:21

## 2022-12-11 RX ADMIN — SODIUM CHLORIDE, PRESERVATIVE FREE 10 ML: 5 INJECTION INTRAVENOUS at 20:24

## 2022-12-11 RX ADMIN — ACETAMINOPHEN 1000 MG: 500 TABLET ORAL at 22:40

## 2022-12-11 RX ADMIN — ACETAMINOPHEN 1000 MG: 500 TABLET ORAL at 08:32

## 2022-12-11 RX ADMIN — SODIUM CHLORIDE, PRESERVATIVE FREE 10 ML: 5 INJECTION INTRAVENOUS at 08:33

## 2022-12-11 RX ADMIN — GABAPENTIN 300 MG: 300 CAPSULE ORAL at 22:40

## 2022-12-11 RX ADMIN — HYDRALAZINE HYDROCHLORIDE 25 MG: 25 TABLET, FILM COATED ORAL at 20:29

## 2022-12-11 RX ADMIN — HYDRALAZINE HYDROCHLORIDE 25 MG: 25 TABLET, FILM COATED ORAL at 13:21

## 2022-12-11 RX ADMIN — SODIUM CHLORIDE 1000 ML: 9 INJECTION, SOLUTION INTRAVENOUS at 07:40

## 2022-12-11 RX ADMIN — METHOCARBAMOL TABLETS 750 MG: 750 TABLET, COATED ORAL at 13:22

## 2022-12-11 RX ADMIN — ACETAMINOPHEN 1000 MG: 500 TABLET ORAL at 13:21

## 2022-12-11 RX ADMIN — ATORVASTATIN CALCIUM 20 MG: 20 TABLET, FILM COATED ORAL at 13:22

## 2022-12-11 RX ADMIN — GABAPENTIN 300 MG: 300 CAPSULE ORAL at 17:21

## 2022-12-11 ASSESSMENT — PAIN DESCRIPTION - DESCRIPTORS
DESCRIPTORS: ACHING;DISCOMFORT
DESCRIPTORS: ACHING;DISCOMFORT
DESCRIPTORS: ACHING;STABBING
DESCRIPTORS: ACHING;DISCOMFORT

## 2022-12-11 ASSESSMENT — PAIN DESCRIPTION - PAIN TYPE
TYPE: CHRONIC PAIN
TYPE: ACUTE PAIN

## 2022-12-11 ASSESSMENT — PAIN SCALES - GENERAL
PAINLEVEL_OUTOF10: 3
PAINLEVEL_OUTOF10: 3
PAINLEVEL_OUTOF10: 4
PAINLEVEL_OUTOF10: 3
PAINLEVEL_OUTOF10: 4
PAINLEVEL_OUTOF10: 3

## 2022-12-11 ASSESSMENT — PAIN DESCRIPTION - FREQUENCY
FREQUENCY: CONTINUOUS
FREQUENCY: CONTINUOUS

## 2022-12-11 ASSESSMENT — PAIN - FUNCTIONAL ASSESSMENT
PAIN_FUNCTIONAL_ASSESSMENT: PREVENTS OR INTERFERES SOME ACTIVE ACTIVITIES AND ADLS

## 2022-12-11 ASSESSMENT — PAIN DESCRIPTION - LOCATION
LOCATION: KNEE
LOCATION: LEG;KNEE
LOCATION: KNEE
LOCATION: KNEE

## 2022-12-11 ASSESSMENT — PAIN DESCRIPTION - ORIENTATION
ORIENTATION: RIGHT;ANTERIOR
ORIENTATION: LEFT
ORIENTATION: RIGHT
ORIENTATION: RIGHT;LEFT

## 2022-12-11 ASSESSMENT — PAIN DESCRIPTION - ONSET: ONSET: ON-GOING

## 2022-12-11 NOTE — PROGRESS NOTES
Trauma Tertiary Survey    Admit Date: 12/10/2022  Hospital day 0    Fall down stairs       Past Medical History:   Diagnosis Date    CVA (cerebral vascular accident) ([facility] [address]     Hypertension     Osteoarthritis     knee    Syncope        Scheduled Meds:   sodium chloride flush  5-40 mL IntraVENous 2 times per day    ceFAZolin  2,000 mg IntraVENous On Call to OR    acetaminophen  1,000 mg Oral 3 times per day    methocarbamol  750 mg Oral TID    gabapentin  300 mg Oral TID     Continuous Infusions:   sodium chloride      sodium chloride       PRN Meds:sodium chloride flush, sodium chloride, ondansetron **OR** ondansetron, polyethylene glycol, bisacodyl, sodium phosphate, oxyCODONE    Subjective:     Patient has no complaint of right knee pain. .  Pain is mild, worsens with movement, and some relief by rest.  There is not associated numbness, tingling, weakness. Objective:   Patient Vitals for the past 8 hrs:   BP Temp Temp src Pulse Resp SpO2   12/10/22 2200 -- -- -- (!) 103 23 98 %   12/10/22 2130 -- -- -- (!) 103 18 96 %   12/10/22 2100 -- -- -- 96 19 99 %   12/10/22 2049 -- -- -- -- -- 98 %   12/10/22 2018 -- -- -- -- 18 --   12/10/22 2015 (!) 145/83 98.1 °F (36.7 °C) Oral (!) 103 16 97 %   12/10/22 1722 (!) 156/87 -- -- (!) 108 18 99 %   12/10/22 1715 (!) 156/87 98.4 °F (36.9 °C) Oral (!) 104 18 97 %       No intake/output data recorded. I/O this shift:  In: 360 [P.O.:360]  Out: 325 [Urine:325]    Radiology:  EXAMINATION:  TWO XRAY VIEWS OF THE LEFT KNEE    12/10/2022 1:40 pm    COMPARISON:  12/10/2022    HISTORY:  ORDERING SYSTEM PROVIDED HISTORY: fractured patella. TECHNOLOGIST PROVIDED HISTORY:  Davy/merchant view and  new lateral views please  fractured patella. Reason for Exam: very difficult to position patient    58-year-old female with possible fracture patella    FINDINGS:  There is age-indeterminate fracture versus bony resorption of the superior  pole of the patella. Small joint effusion. Moderate edema along the  anterior knee. Remaining visualized osseous structures appear intact. IMPRESSION:     Age-indeterminate fracture and/or bony resorption of the upper pole of the  patella. Small joint effusion. Moderate edema. Underlying  infection/osteomyelitis not excluded on the basis of this study in the  appropriate clinical setting. Interpreted by:  Aruna Saldana MD    Signed by:  Aruna Saldana MD  12/10/22    Final result      PHYSICAL EXAM:   GCS:  4 - Opens eyes on own   6 - Follows simple motor commands  5 - Alert and oriented    Pupil size:  Left 2 mm Right 2 mm  Pupil reaction: Yes  Wiggles fingers: Left Yes Right Yes  Hand grasp:   Left normal   Right normal  Wiggles toes: Left Yes    Right Yes  Plantar flexion: Left normal  Right normal      BP (!) 144/90   Pulse 85   Temp 98.7 °F (37.1 °C) (Oral)   Resp 15   Ht 5' 2\" (1.575 m)   Wt (!) 313 lb 4.4 oz (142.1 kg)   SpO2 97%   BMI 57.30 kg/m²   General appearance: alert, appears stated age, cooperative, and morbidly obese  Head: Normocephalic, without obvious abnormality, atraumatic  Nose: Nares normal. Septum midline. Mucosa normal. No drainage or sinus tenderness.   Lungs:  No increased work of breathing  Heart:  tachycardia, regular rhythm  Abdomen:  soft, nontender, nondistended  Pulses: 2+ and symmetric  Skin: Skin color, texture, turgor normal. No rashes or lesions or bruising over posterior left shoulder  Neurologic: Grossly normal    Spine:     Spine Tenderness ROM   Cervical 0 /10 Normal   Thoracic 0 /10 Normal   Lumbar 0 /10 Normal     Musculoskeletal    Joint Tenderness Swelling ROM   Right shoulder present absent normal   Left shoulder absent absent normal   Right elbow absent absent normal   Left elbow absent absent normal   Right wrist absent absent normal   Left wrist absent absent normal   Right hand grasp absent absent normal   Left hand grasp absent absent normal   Right hip absent absent normal   Left hip absent absent Limited secondary to knee pain   Right knee absent Absent  normal   Left knee present present In immobilizer   Right ankle absent absent normal   Left ankle absent absent normal   Right foot absent absent normal   Left foot absent absent normal             CONSULTS: orthopedics    PROCEDURES:     INJURIES:        Patient Active Problem List   Diagnosis    Syncope    Osteoarthritis    History of CVA (cerebrovascular accident)    Essential hypertension    Vertigo    Chronic allergic rhinitis    Morbid obesity with BMI of 50.0-59.9, adult ([address]    Mixed hyperlipidemia    Avulsion fracture         Assessment/Plan:     - Orthopedic repair of knee  - preop evaluation by cardiology  - MMPT      Flakita Melchor DO  12/11/22, 12:45 AM

## 2022-12-11 NOTE — ACP (ADVANCE CARE PLANNING)
Advance Care Planning     Advance Care Planning Inpatient Note  [facility] Department    Today's Date: 12/10/2022  Unit: [facility] NEURO    Received request from [place]. Upon review of chart and communication with care team, patient's decision making abilities are not in question. . Patient was/were present in the room during visit. Goals of ACP Conversation:  Discuss advance care planning documents    Health Care Decision Makers:       Primary Decision Maker: Moustapha Marques - Brother/Sister - [phone]    Secondary Decision Maker: Iris Cadena - Brother/Sister - [phone]    Secondary Decision Maker: Servando Azizas - Brother/Sister - [phone]    Secondary Decision Maker: Nichole Matthewgi - Brother/Sister - [phone]  Summary:  Completed new documents    Advance Care Planning Documents (Patient Wishes):  Healthcare Power of /Advance Directive Appointment of Health Care Agent  Living Will/Advance Directive     Assessment:   connected with patient's nurse who confirmed capacity.  facilitated ACP conversation/documentation.  provided education, and answered questions regarding, ACP documentation. Interventions:  Provided education on documents for clarity and greater understanding  Discussed and provided education on state decision maker hierarchy  Assisted in the completion of documents according to patient's wishes at this time  Encouraged ongoing ACP conversation with future decision makers and loved ones    Care Preferences Communicated:   No    Outcomes/Plan:  New advance directive completed. Returned original document(s) to patient, as well as copies for distribution to appointed agents  Copy of advance directive given to staff to scan into medical record.   Teach Back Method used to verify the patient's and/or Healthcare Decision Maker's understanding of key information in the advance directive documents    Electronically signed by Susy Ponce  Resident on 12/10/2022 at 7:54 PM

## 2022-12-11 NOTE — PROGRESS NOTES
PROGRESS NOTE          PATIENT NAME: Syed Bermudez  MEDICAL RECORD NO. [id number]  DATE: 2022  SURGEON: Charlette Rodriguez  PRIMARY CARE PHYSICIAN: DEMARCUS Liu - CNP    HD: # 1    ASSESSMENT    Patient Active Problem List   Diagnosis    Syncope    Osteoarthritis    History of CVA (cerebrovascular accident)    Essential hypertension    Vertigo    Chronic allergic rhinitis    Morbid obesity with BMI of 50.0-59.9, adult ([address]    Mixed hyperlipidemia    Avulsion fracture       MEDICAL DECISION MAKING AND PLAN    43-year-old female with PMH including history of stroke \"in \" with no residual deficits and HTN who originally presented to the ED on 12/10/2022 after a mechanical fall where her purse became wrapped around her walker causing her to fall down 4-5 stairs inside her house. She suffered a left 2.4 cm superiorly displaced avulsion fracture at the upper pole of the patella due to the fall. Patellar fracture  -Plan for OR tomorrow with Dr. Joe French for quadriceps tendon/superior patella avulsion fracture repair. Diet started. NPO at midnight  -echo for risk stratification pending. Cardiology consulted. Below average surgical risk (see below). EKG with sinus tachycardia  -NWB LLE   -Keep knee immobilizer on please  -Ancef  - holding pharm AC until POD#1  - PT/OT after surgery  - pain control        Chief Complaint: \"doing well\"    SUBJECTIVE    Seen resting comfortably this morning. Plans for the OR today. Echocardiogram pending for preop clearance. Cardiology message regarding preop clearance.   No complaints and slept well overnight      OBJECTIVE  VITALS: Temp: Temp: 97.7 °F (36.5 °C)Temp  Av.1 °F (36.7 °C)  Min: 97.7 °F (36.5 °C)  Max: 98.4 °F (42.0 °C) BP Systolic (52MJC), EOZ:654 , Min:131 , CTN:815   Diastolic (36UUU), EMETERIO:49, Min:71, Max:145   Pulse Pulse  Av.3  Min: 83  Max: 128 Resp Resp  Av  Min: 14  Max: 28 Pulse ox SpO2  Av.2 %  Min: 93 %  Max: 99 %  Constitutional:       General: She is not in acute distress. Appearance: She is obese. She is not ill-appearing. HENT:      Head: Normocephalic and atraumatic. Right Ear: External ear normal.      Left Ear: External ear normal.      Nose: Nose normal. No congestion. Mouth/Throat:      Mouth: Mucous membranes are moist.      Pharynx: Oropharynx is clear. Eyes:      General: No scleral icterus. Extraocular Movements: Extraocular movements intact. Pupils: Pupils are equal, round, and reactive to light. Cardiovascular:      Rate and Rhythm: Normal rate and regular rhythm. Pulses: Normal pulses. Heart sounds: Normal heart sounds. Pulmonary:      Effort: Pulmonary effort is normal. No respiratory distress. Breath sounds: Normal breath sounds. Abdominal:      General: There is no distension. Palpations: Abdomen is soft. Tenderness: There is no abdominal tenderness. There is no guarding. Musculoskeletal:         General: Tenderness and signs of injury present. No deformity. Cervical back: Neck supple. Comments: Left LE tenderness to palpation. LLE in brace   Skin:     General: Skin is warm and dry. Neurological:      Mental Status: She is oriented to person, place, and time. Mental status is at baseline. Sensory: No sensory deficit. Motor: No weakness. Psychiatric:         Mood and Affect: Mood normal.         Behavior: Behavior normal.         Thought Content: Thought content normal.         Judgment: Judgment normal.        No intake/output data recorded. Drain/tube output:   In: 884.7 [P.O.:360; I.V.:524.7]  Out: 325 [Urine:325]    LAB:  CBC:   Recent Labs     12/10/22  1257 12/11/22  0301   WBC 15.7* 6.6   HGB 12.1 9.8*   HCT 38.2 30.9*   MCV 85.7 86.1    212     BMP:   Recent Labs     12/10/22  1257 12/11/22  0301    138   K 3.7 3.7    101   CO2 25 25   BUN 15 13   CREATININE 0.66 0.63   GLUCOSE 96 96     COAGS: Recent Labs     12/10/22  1744   APTT 23.6   INR 1.0       RADIOLOGY:  XR FEMUR LEFT (MIN 2 VIEWS)    Result Date: 12/10/2022  Left shoulder: 1. Mild degenerative changes as above. 2. No acute fracture or dislocation. Left femur: 1. Diffuse osteopenia. No acute osseous abnormality. 2. Tricompartmental osteoarthrosis of the left knee. Left knee: 1. Suspected age-indeterminate fracture at the upper pole of the patella. 2. Tricompartmental osteoarthrosis. Severe degenerative changes of the medial compartment. Left tib fib: 1. Suspected superiorly displaced avulsion fractures at the upper pole of the patella superiorly displaced by 2.4 cm. Small suprapatellar joint effusion. 2. Moderate degenerative changes as detailed above. 3. Tibia and fibula appear intact. 4. Mild-to-moderate soft tissue edema of the left leg and ankle. XR KNEE LEFT (1-2 VIEWS)    Result Date: 12/10/2022  Age-indeterminate fracture and/or bony resorption of the upper pole of the patella. Small joint effusion. Moderate edema. Underlying infection/osteomyelitis not excluded on the basis of this study in the appropriate clinical setting. XR KNEE LEFT (3 VIEWS)    Result Date: 12/10/2022  Left shoulder: 1. Mild degenerative changes as above. 2. No acute fracture or dislocation. Left femur: 1. Diffuse osteopenia. No acute osseous abnormality. 2. Tricompartmental osteoarthrosis of the left knee. Left knee: 1. Suspected age-indeterminate fracture at the upper pole of the patella. 2. Tricompartmental osteoarthrosis. Severe degenerative changes of the medial compartment. Left tib fib: 1. Suspected superiorly displaced avulsion fractures at the upper pole of the patella superiorly displaced by 2.4 cm. Small suprapatellar joint effusion. 2. Moderate degenerative changes as detailed above. 3. Tibia and fibula appear intact. 4. Mild-to-moderate soft tissue edema of the left leg and ankle.      XR TIBIA FIBULA LEFT (2 VIEWS)    Result Date: 12/10/2022  Left shoulder: 1. Mild degenerative changes as above. 2. No acute fracture or dislocation. Left femur: 1. Diffuse osteopenia. No acute osseous abnormality. 2. Tricompartmental osteoarthrosis of the left knee. Left knee: 1. Suspected age-indeterminate fracture at the upper pole of the patella. 2. Tricompartmental osteoarthrosis. Severe degenerative changes of the medial compartment. Left tib fib: 1. Suspected superiorly displaced avulsion fractures at the upper pole of the patella superiorly displaced by 2.4 cm. Small suprapatellar joint effusion. 2. Moderate degenerative changes as detailed above. 3. Tibia and fibula appear intact. 4. Mild-to-moderate soft tissue edema of the left leg and ankle. XR SHOULDER LEFT (MIN 2 VIEWS)    Result Date: 12/10/2022  Left shoulder: 1. Mild degenerative changes as above. 2. No acute fracture or dislocation. Left femur: 1. Diffuse osteopenia. No acute osseous abnormality. 2. Tricompartmental osteoarthrosis of the left knee. Left knee: 1. Suspected age-indeterminate fracture at the upper pole of the patella. 2. Tricompartmental osteoarthrosis. Severe degenerative changes of the medial compartment. Left tib fib: 1. Suspected superiorly displaced avulsion fractures at the upper pole of the patella superiorly displaced by 2.4 cm. Small suprapatellar joint effusion. 2. Moderate degenerative changes as detailed above. 3. Tibia and fibula appear intact. 4. Mild-to-moderate soft tissue edema of the left leg and ankle. XR CHEST PORTABLE    Result Date: 12/10/2022  No acute focal airspace consolidation. David Everett DO  12/11/22, 6:46 AM        Attending Note    Awaiting ECHO results for risk stratification  I have reviewed the above TECSS note(s) and I either performed the key elements of the medical history and physical exam or was present with the resident when the key elements of the medical history and physical exam were performed.  I have discussed the findings, established the care plan and recommendations with Residents.     Clementine Duran MD  12/11/2022  12:10 PM

## 2022-12-11 NOTE — PROGRESS NOTES
Orthopedic Progress Note    Patient:  Santosh Sanchez  YOB: 1960     60 y.o. female    Subjective:  Patient seen and examined at bedside this morning. No new complaints or concerns per patient this morning. Pain well-controlled. No acute issues overnight per nursing. Patient needs echo prior to OR per trauma team.   Denies: fever/chills, HA, CP, SOB, N/V, dysuria, or numbness/tingling in extremities. Objective:   Vitals:    12/11/22 0400   BP:    Pulse: 83   Resp: 15   Temp:    SpO2:      Gen: NAD, cooperative   Cardiovascular: Regular rate  Respiratory: no audible wheezing, symmetrical chest expansion   MSK: Left lower extremity: Knee immobilizer on. Ecchymoses present to anterior knee. No lacerations/abrasions. Skin intact. Tenderness over anterior aspect of knee. Compartments soft and compressible. EHL/FHL/TA/GSC gross motor intact. Sural, saphenous, superificial/deep peroneal, and plantar nerve distribution SILT. DP/PT pulses 2+ with BCR; foot warm and perfused. Recent Labs     12/10/22  1744 12/11/22  0301   WBC  --  6.6   HGB  --  9.8*   HCT  --  30.9*   PLT  --  212   INR 1.0  --    NA  --  138   K  --  3.7   BUN  --  13   CREATININE  --  0.63   GLUCOSE  --  96       Meds: See rec for complete list    Impression: 60 y.o. female being seen after a fall from standing height with the following:   - Left superior pole of patella avulsion fracture   - Quadriceps tendon avulsion          Plan:     -Plan for OR today with Dr. Jessica Ovalles for quadriceps tendon/superior patella avulsion fracture repair   -Would appreciate risk stratification note from primary team   -NWB LLE   -Knee immobilizer on. Please maintain.   -Hemoglobin 9.8 this morning. -NPO  -Ancef on call to OR   -Consent in chart   -Pain control: Per primary team discretion   -Ice (20 min, 1 hour off) and elevation for edema/pain control  -Encourage deep breathing and IS  -DVT ppx: EPC.  Please hold chemical anticoagulation until POD#1   -PT/OT to evaluate and treat   -Please page Ortho on call with any questions or concerns    Autumn Katz, DO  PGY-3 Orthopedic Surgery  5:23 AM 12/11/2022

## 2022-12-11 NOTE — CARE COORDINATION
Case Management Assessment  Initial Evaluation    Date/Time of Evaluation: 12/11/2022 9:57 AM  Assessment Completed by: Cassandra Luu RN    If patient is discharged prior to next notation, then this note serves as note for discharge by case management. Patient Name: Delilah Carl                   YOB: 1960  Diagnosis: Avulsion fracture [T14. 8XXA]  Closed displaced fracture of left patella, unspecified fracture morphology, initial encounter [S82.002A]                   Date / Time: 12/10/2022  9:48 AM    Patient Admission Status: Inpatient   Readmission Risk (Low < 19, Mod (19-27), High > 27): Readmission Risk Score: 12.2    Current PCP: DEMARCUS Devine - CNP  PCP verified by CM? (P) Yes (Evelyn TRACY)    Chart Reviewed: Yes      History Provided by: (P) Patient  Patient Orientation: (P) Alert and Oriented, Person, Place, Situation    Patient Cognition: (P) Alert    Hospitalization in the last 30 days (Readmission):  No    If yes, Readmission Assessment in CM Navigator will be completed.     Advance Directives:      Code Status: Full Code   Patient's Primary Decision Maker is: (P) Legal Next of Kin    Primary Decision Maker: Moustapha Marques - Brother/Sister - [phone]    Secondary Decision Maker: Rhona Daniel - Brother/Sister - [phone]    Secondary Decision Maker: Cindy Monteiro - Brother/Sister - [phone]    Secondary Decision Maker: Gabo Adorno - Brother/Sister - [phone]    Discharge Planning:    Patient lives with: (P) Alone Type of Home: (P) House  Primary Care Giver: (P) Self  Patient Support Systems include: (P) Family Members   Current Financial resources: (P) Medicare  Current community resources:    Current services prior to admission: (P) Durable Medical Equipment            Current DME: (P) [employer] [facility] [employer]            Type of Home Care services:  (P) Skilled Therapy, Nursing Services    ADLS  Prior functional level: (P) Independent in ADLs/IADLs  Current functional level: (P) Assistance with the following:, Toileting, Mobility, Dressing    PT AM-PAC:   /24  OT AM-PAC:   /24    Family can provide assistance at DC: (P) Yes  Would you like Case Management to discuss the discharge plan with any other family members/significant others, and if so, who? (P) No  Plans to Return to Present Housing: (P) Yes  Other Identified Issues/Barriers to RETURNING to current housing: na  Potential Assistance needed at discharge: (P) Home Care            Potential DME:    Patient expects to discharge to: (P) [address] [place] [address] for transportation at discharge: (P) Family    Financial    Payor: MEDICARE / Plan: MEDICARE PART A AND B / Product Type: *No Product type* /     Does insurance require precert for SNF: No    Potential assistance Purchasing Medications: (P) No  Meds-to-Beds request:        [address] [place] [address],[address] [facility] [phone] - F [phone]  [address] [facility] [address]  Phone: [phone] Fax: [phone]    [facility] [place] [address]  Phone: [phone] Fax: [phone]      Notes:    Factors facilitating achievement of predicted outcomes: Family support, Cooperative, Pleasant, and Good insight into deficits    Barriers to discharge: Pain, Decreased endurance, Lower extremity weakness  Additional Case Management Notes: Spoke with patient at bedside, role explained. Plan for surgery on 12/12/2022. Goal home with sister and home care. List provided. Address, telephone numbers, PCP, ER contacts and insurance reviewed. Will continue to monitor needs following surgery. The Plan for Transition of Care is related to the following treatment goals of Avulsion fracture [T14. 8XXA]  Closed displaced fracture of left patella, unspecified fracture morphology, initial encounter [J42.237T]    IF APPLICABLE: The Patient and/or patient representative Rick Cowan and her family were provided with a choice of provider and agrees with the discharge plan. Freedom of choice list with basic dialogue that supports the patient's individualized plan of care/goals and shares the quality data associated with the providers was provided to: (P) Patient   Patient Representative Name:       The Patient and/or Patient Representative Agree with the Discharge Plan? (P) Yes    Megan Kern RN  Case Management Department  Ph: [phone]                       Post Acute Facility/Agency List     Provided patient with the following list, the list includes the overall star ratings obtained from CMS per the Medicare Web site (www.Medicare.gov):     [] [facility]  [] Acute Inpatient Rehabilitation Facilities  [] Skilled Nursing Facilities  [x] Home Care    Provided verbal instructions on how to utilize the QR Code to obtain additional detailed star ratings from www. Medicare. gov     offered to print and provide the detailed list:    []Accepted   [x]Declined    The following printed detailed lists were provided:  choices to follow

## 2022-12-11 NOTE — PROGRESS NOTES
[facility] [address]  PROGRESS NOTE    Shift date: 12/10/22    Shift day: Saturday   Shift # 2    Room # 0102/0102-01   Name: Olga Lidia Hernandez                  Referral:  physician    Admit Date & Time: 12/10/2022  9:48 AM    Assessment:  Olga Lidia Hernandez is a 60 y.o. female in the hospital after experiencing a fall; patient will undergo surgery tomorrow.  responded to patient's room per Spiritual Consult for ACP conversation/documentation (see ACP note).  was welcomed into the room by patient, observed patient to be calm and coping well, and heard feelings of anxiety and hopefulness through conversation; patient appeared to be experiencing physical discomfort at times during visit. Intervention:  Writer introduced self and title as  Writer offered space for the patient  to express feelings, needs, and concerns and provided a ministry presence.  explored feelings shared and learned that the patient has never had surgery prior to her scheduled procedure (tomorrow), and expressed anxiety being sedated and having no control.  explored sources of support and learned that patient has 9 sisters (8 living) and one brother, and lives in her family house with four of her siblings; patient expressed receiving comfort and christopher from her family.  reflected feelings shared, extended hospitality, and offered prayer and a blessing for patient. Outcome:  Patient appeared to receive comfort from  presence and prayer, and expressed gratitude for  visit and ACP facilitation. Plan:  Chaplains will remain available to offer spiritual and emotional support as needed.        12/10/22 1943   Encounter Summary   Service Provided For: Patient   Referral/Consult From: Physician   Support System Family members   Last Encounter  12/10/22   Complexity of Encounter Moderate   Begin Time 1900   End Time  2000   Total Time Calculated 60 min   Spiritual/Emotional needs Type Spiritual Support   Advance Care Planning   Type ACP conversation; Completed AD/ACP document(s)   Assessment/Intervention/Outcome   Assessment Anxious; Coping   Intervention Active listening;Sustaining Presence/Ministry of presence;Prayer (assurance of)/[place]   Outcome Comfort;Expressed Gratitude   Plan and Referrals   Plan/Referrals Continue Support (comment)     Electronically signed by Cher Barrientos, on 12/10/2022 at 7:44 PM.  [facility]  [phone]

## 2022-12-11 NOTE — PLAN OF CARE
Problem: Discharge Planning  Goal: Discharge to home or other facility with appropriate resources  12/11/2022 1711 by Cesar Maldonado  Outcome: Progressing  12/11/2022 0430 by Shara Hicks RN  Outcome: Progressing     Problem: Pain  Goal: Verbalizes/displays adequate comfort level or baseline comfort level  12/11/2022 1711 by Cesar Maldonado  Outcome: Progressing  12/11/2022 0430 by Shara Hicks RN  Outcome: Progressing     Problem: ABCDS Injury Assessment  Goal: Absence of physical injury  12/11/2022 1711 by Cesar Maldonado  Outcome: Progressing  12/11/2022 0430 by Shara Hicks RN  Outcome: Progressing     Problem: Skin/Tissue Integrity  Goal: Absence of new skin breakdown  Description: 1. Monitor for areas of redness and/or skin breakdown  2. Assess vascular access sites hourly  3. Every 4-6 hours minimum:  Change oxygen saturation probe site  4. Every 4-6 hours:  If on nasal continuous positive airway pressure, respiratory therapy assess nares and determine need for appliance change or resting period.   12/11/2022 1711 by Cesar Maldonado  Outcome: Progressing  12/11/2022 0430 by Shara Hicks RN  Outcome: Progressing     Problem: Safety - Adult  Goal: Free from fall injury  12/11/2022 1711 by Cesar Maldonado  Outcome: Progressing  12/11/2022 0430 by Shara Hicks RN  Outcome: Progressing     Problem: Chronic Conditions and Co-morbidities  Goal: Patient's chronic conditions and co-morbidity symptoms are monitored and maintained or improved  Outcome: Progressing

## 2022-12-11 NOTE — CONSULTS
[facility]    Consult / H&P               Today's Date: 12/11/2022  Patient Name: Chari Fernando  Date of admission: 12/10/2022  9:48 AM  Patient's age: 60 y.o., 1960  Admission Dx: Avulsion fracture [T14. 8XXA]  Closed displaced fracture of left patella, unspecified fracture morphology, initial encounter [S82.002A]    Reason for Consult:  Cardiac evaluation    Requesting Physician: Graciela Hobbs MD    CHIEF COMPLAINT:  Fall    History Obtained From:  patient, electronic medical record    HISTORY OF PRESENT ILLNESS:      The patient is a 60 y.o.  female who is admitted to the hospital for upper patellar fracture. The patient sustained a mechanical fall from standing height after her purse got wrapped around her walker and fell off the stairs. She denied any dizziness, lightheadedness, chest pain or shortness of breath. Se came to the ER and was found to have a left 2.4 cm superiorly displaced avulsion fracture at the upper pole of the patella, was evaluated by Ortho and was planned to be take to the OR for quadriceps tendon/ superior patella avulsion fracture repair. Stat ECHO was ordered per Ortho and Cardiology was consulted after the same for clearance. The patient is able to ambulate with a walker without any significant chest pain or shortness of breath. She was hypertensive and tachycardic on admission. Lab work unremarkable. EKG reviewed as below and imaging reviewed. She has h/o HTN and possible CVA in 2010. RCRI 1 pending 2D ECHO. Past Medical History:   has a past medical history of CVA (cerebral vascular accident) ([address] Hypertension, Osteoarthritis, and Syncope. Past Surgical History:   has no past surgical history on file. Home Medications:    Prior to Admission medications    Medication Sig Start Date End Date Taking?  Authorizing Provider   traMADol (ULTRAM) 50 MG tablet Take 1 tablet by mouth 3 times daily as needed for Pain for up to 30 days. 10/20/22 11/19/22  DEMARCUS Devine - CNP   hydrALAZINE (APRESOLINE) 25 MG tablet Take 1 tab twice a day 10/20/22   Henrry Sim APRN - CNP   loratadine (CLARITIN) 10 MG tablet TAKE 1 TABLET BY MOUTH EVERY DAY 10/20/22   Henrry Sim APRN - CNP   aspirin-dipyridamole (AGGRENOX)  MG per extended release capsule Take 1 capsule by mouth 2 times daily 10/20/22   Henrry Sim APRN - CNP   meclizine (ANTIVERT) 12.5 MG tablet TAKE 1 TABLET BY MOUTH THREE TIMES A DAY AS NEEDED 10/20/22   DEMARCUS Devine - CNP   Misc.  Devices MISC Provide handicap placard, expires 5 years from this date 10/20/2027  Medical conditions prevent the ability to walk long distances 10/20/22   DEMARCUS Devine - CNP   hydroCHLOROthiazide (HYDRODIURIL) 25 MG tablet TAKE 1 TABLET BY MOUTH EVERY DAY 8/22/22   Henrry Sim APRN - CNP   meloxicam (MOBIC) 15 MG tablet TAKE 1 TABLET BY MOUTH EVERY DAY 8/17/22   Henrry Sim APRN - CNP   amLODIPine (NORVASC) 10 MG tablet TAKE 1 TABLET BY MOUTH EVERY DAY 3/8/22   DEMARCUS Chinchilla CNP   atorvastatin (LIPITOR) 20 MG tablet TAKE 1 TABLET BY MOUTH EVERY DAY 8/10/20   DEMARCUS Devine - CNP   aspirin-acetaminophen-caffeine (EXCEDRIN MIGRAINE) 417-547-03 MG per tablet Take 1 tablet by mouth every 6 hours as needed for Pain 3/29/17   Britta Romero P Blood, DO      Current Facility-Administered Medications: [START ON 12/12/2022] ceFAZolin (ANCEF) 2000 mg in sterile water 20 mL IV syringe, 2,000 mg, IntraVENous, On Call to OR  sodium chloride flush 0.9 % injection 5-40 mL, 5-40 mL, IntraVENous, 2 times per day  sodium chloride flush 0.9 % injection 5-40 mL, 5-40 mL, IntraVENous, PRN  0.9 % sodium chloride infusion, , IntraVENous, PRN  ondansetron (ZOFRAN-ODT) disintegrating tablet 4 mg, 4 mg, Oral, Q8H PRN **OR** ondansetron (ZOFRAN) injection 4 mg, 4 mg, IntraVENous, Q6H PRN  polyethylene glycol (GLYCOLAX) packet 17 g, 17 g, Oral, Daily PRN  bisacodyl (DULCOLAX) suppository 10 mg, 10 mg, Rectal, Daily PRN  sodium phosphate (FLEET) rectal enema 1 enema, 1 enema, Rectal, Daily PRN  oxyCODONE (ROXICODONE) immediate release tablet 5 mg, 5 mg, Oral, Q6H PRN  acetaminophen (TYLENOL) tablet 1,000 mg, 1,000 mg, Oral, 3 times per day  methocarbamol (ROBAXIN) tablet 750 mg, 750 mg, Oral, TID  gabapentin (NEURONTIN) capsule 300 mg, 300 mg, Oral, TID    Allergies:  Latex, Ace inhibitors, Dye [iodides], and Pcn [penicillins]    Social History:   reports that she has never smoked. She has never used smokeless tobacco. She reports that she does not drink alcohol and does not use drugs. Family History: family history includes Allergies in her sister; Breast Cancer in her mother; Colon Cancer in her sister; Heart Disease in her brother and sister; Hypertension in her brother, father, mother, and sister; Migraines in her sister; Stroke in her father; Tuberculosis in her sister. No h/o sudden cardiac death. No for premature CAD    REVIEW OF SYSTEMS:    Constitutional: there has been no unanticipated weight loss. There's been No change in energy level, No change in activity level. Eyes: No visual changes or diplopia. No scleral icterus. ENT: No Headaches  Cardiovascular: No cardiac history  Respiratory: No previous pulmonary problems, No cough  Gastrointestinal: No abdominal pain. No change in bowel or bladder habits. Genitourinary: No dysuria, trouble voiding, or hematuria. Musculoskeletal:  No gait disturbance, No weakness or joint complaints. Integumentary: No rash or pruritis. Neurological: No headache, diplopia, change in muscle strength, numbness or tingling. No change in gait, balance, coordination, mood, affect, memory, mentation, behavior. Psychiatric: No anxiety, or depression. Endocrine: No temperature intolerance. No excessive thirst, fluid intake, or urination. No tremor.   Hematologic/Lymphatic: No abnormal bruising or bleeding, blood clots or swollen lymph nodes. Allergic/Immunologic: No nasal congestion or hives. PHYSICAL EXAM:      BP (!) 152/83   Pulse 90   Temp 98.1 °F (36.7 °C) (Oral)   Resp 17   Ht 5' 2\" (1.575 m)   Wt (!) 313 lb 4.4 oz (142.1 kg)   SpO2 99%   BMI 57.30 kg/m²    Constitutional and General Appearance: alert, cooperative, no distress and appears stated age  HEENT: PERRL, no cervical lymphadenopathy. No masses palpable. Normal oral mucosa  Respiratory:  Normal excursion and expansion without use of accessory muscles  Resp Auscultation: Good respiratory effort. No for increased work of breathing. On auscultation: clear to auscultation bilaterally  Cardiovascular: The apical impulse is not displaced  Heart tones are crisp and normal. regular S1 and S2, systolic murmur present on auscultation   Jugular venous pulsation Normal  Peripheral pulses are symmetrical and full   Abdomen:   No masses or tenderness  Bowel sounds present  Extremities:   No Cyanosis or Clubbing   Lower extremity edema: No   Skin: Warm and dry  Neurological:  Alert and oriented. Moves all extremities well  No abnormalities of mood, affect, memory, mentation, or behavior are noted    DATA:    Diagnostics:    EKG: Normal sinus rhythm, no acute ST or T wave changes   ECHO: ordered, but not yet obtained. Stress Test: not obtained  Cardiac Angiography: not obtained. Labs:     CBC:   Recent Labs     12/10/22  1257 12/11/22  0301   WBC 15.7* 6.6   HGB 12.1 9.8*   HCT 38.2 30.9*    212     BMP:   Recent Labs     12/10/22  1257 12/11/22  0301    138   K 3.7 3.7   CO2 25 25   BUN 15 13   CREATININE 0.66 0.63   LABGLOM >60 >60   GLUCOSE 96 96     BNP: No results for input(s): BNP in the last 72 hours. PT/INR:   Recent Labs     12/10/22  1744   PROTIME 11.0   INR 1.0     APTT:  Recent Labs     12/10/22  1744   APTT 23.6     CARDIAC ENZYMES:No results for input(s): CKTOTAL, CKMB, CKMBINDEX, TROPONINI in the last 72 hours.   FASTING LIPID PANEL:  Lab Results   Component Value Date/Time    HDL 65 10/20/2022 10:16 AM    TRIG 71 10/20/2022 10:16 AM     LIVER PROFILE:No results for input(s): AST, ALT, LABALBU in the last 72 hours. IMPRESSION:    Patient Active Problem List   Diagnosis    Syncope    Osteoarthritis    History of CVA (cerebrovascular accident)    Essential hypertension    Vertigo    Chronic allergic rhinitis    Morbid obesity with BMI of 50.0-59.9, adult ([address]    Mixed hyperlipidemia    Avulsion fracture     Mechanical fall from standing height   Left patellar avulsion fracture, plan for repair 12/12/2022, preop clearance   HTN  HLD  CVA   Obesity     RECOMMENDATIONS:  Recommend obtaining 2D ECHO for EF evaluation and diastolic function, final RCRI amnd risk stratification based on the same. If the EF is preserved, the patient may proceed for the surgery as a moderate risk from Cardiology standpoint. Patient is hypertensive and tachycardic, will recommend starting coreg 6.25 mg BID. Can resume norvasc at home dose if needed for appropriate BP control. Resume lipitor 20 mg nightly. Continue ASA if okay per primary team.   Replace electrolytes closely to keep K>4 and Mg>2. Continue rest of management as per Trauma and Orthopedics team.   We will continue to follow. Alexandra Valdes MD  Fellow, [address]        I reviewed the patient history personally, and examined by me during the visit. I have reviewed the H&P/Consult / Progress note as completed, and made appropriate changes to the patient exam and treatment plans.       I have reviewed the case in details including physical exam and treatment plan with resident / fellow / NP    Patient treatment plan was explained to patient, correction in notes was made as appropriate, and discussed final arrangement based on  my evaluation and exam.    Additional Recommendations:      Lonnie Shah MD  [facility]

## 2022-12-11 NOTE — PLAN OF CARE
Brief Ortho Plan of Care      Orthopedics informed at 2499 by trauma team that echo was ordered in order to obtain pre-operative cardiac clearance. Was not cleared for surgery this AM at time of orthopedics rounding on patient and echo had not yet been started. Discussion with attending was had regarding surgical timing as echo is still not resulted. Decision made to delay surgery until tomorrow, 12/12 so that echo can be resulted prior to surgery.      NPO at midnight   Ancef on call to OR   Consent in chart  Please page ortho resident on call with questions       Gumaro Prescott DO  PGY-3 Orthopedic Surgery  7:56 AM 12/11/2022

## 2022-12-12 ENCOUNTER — APPOINTMENT (OUTPATIENT)
Dept: GENERAL RADIOLOGY | Age: 62
DRG: 488 | End: 2022-12-12
Payer: MEDICARE

## 2022-12-12 PROBLEM — S82.002A CLOSED DISPLACED FRACTURE OF LEFT PATELLA: Status: ACTIVE | Noted: 2022-12-12

## 2022-12-12 LAB
ABSOLUTE EOS #: 0.13 K/UL (ref 0–0.44)
ABSOLUTE IMMATURE GRANULOCYTE: <0.03 K/UL (ref 0–0.3)
ABSOLUTE LYMPH #: 1.82 K/UL (ref 1.1–3.7)
ABSOLUTE MONO #: 0.7 K/UL (ref 0.1–1.2)
ANION GAP SERPL CALCULATED.3IONS-SCNC: 8 MMOL/L (ref 9–17)
BASOPHILS # BLD: 1 % (ref 0–2)
BASOPHILS ABSOLUTE: 0.04 K/UL (ref 0–0.2)
BUN BLDV-MCNC: 9 MG/DL (ref 8–23)
CALCIUM SERPL-MCNC: 8.3 MG/DL (ref 8.6–10.4)
CHLORIDE BLD-SCNC: 102 MMOL/L (ref 98–107)
CO2: 24 MMOL/L (ref 20–31)
CREAT SERPL-MCNC: 0.53 MG/DL (ref 0.5–0.9)
EOSINOPHILS RELATIVE PERCENT: 2 % (ref 1–4)
GFR SERPL CREATININE-BSD FRML MDRD: >60 ML/MIN/1.73M2
GLUCOSE BLD-MCNC: 83 MG/DL (ref 70–99)
HCT VFR BLD CALC: 30.4 % (ref 36.3–47.1)
HEMOGLOBIN: 9.6 G/DL (ref 11.9–15.1)
IMMATURE GRANULOCYTES: 0 %
LYMPHOCYTES # BLD: 22 % (ref 24–43)
MCH RBC QN AUTO: 27.5 PG (ref 25.2–33.5)
MCHC RBC AUTO-ENTMCNC: 31.6 G/DL (ref 28.4–34.8)
MCV RBC AUTO: 87.1 FL (ref 82.6–102.9)
MONOCYTES # BLD: 8 % (ref 3–12)
NRBC AUTOMATED: 0 PER 100 WBC
PDW BLD-RTO: 13.3 % (ref 11.8–14.4)
PLATELET # BLD: 196 K/UL (ref 138–453)
PMV BLD AUTO: 9.7 FL (ref 8.1–13.5)
POTASSIUM SERPL-SCNC: 3.6 MMOL/L (ref 3.7–5.3)
RBC # BLD: 3.49 M/UL (ref 3.95–5.11)
SEG NEUTROPHILS: 67 % (ref 36–65)
SEGMENTED NEUTROPHILS ABSOLUTE COUNT: 5.68 K/UL (ref 1.5–8.1)
SODIUM BLD-SCNC: 134 MMOL/L (ref 135–144)
WBC # BLD: 8.4 K/UL (ref 3.5–11.3)

## 2022-12-12 PROCEDURE — 6370000000 HC RX 637 (ALT 250 FOR IP): Performed by: STUDENT IN AN ORGANIZED HEALTH CARE EDUCATION/TRAINING PROGRAM

## 2022-12-12 PROCEDURE — 1200000000 HC SEMI PRIVATE

## 2022-12-12 PROCEDURE — 2580000003 HC RX 258

## 2022-12-12 PROCEDURE — 0MQP0ZZ REPAIR LEFT KNEE BURSA AND LIGAMENT, OPEN APPROACH: ICD-10-PCS | Performed by: ORTHOPAEDIC SURGERY

## 2022-12-12 PROCEDURE — 7100000001 HC PACU RECOVERY - ADDTL 15 MIN: Performed by: ORTHOPAEDIC SURGERY

## 2022-12-12 PROCEDURE — 2580000003 HC RX 258: Performed by: STUDENT IN AN ORGANIZED HEALTH CARE EDUCATION/TRAINING PROGRAM

## 2022-12-12 PROCEDURE — 27524 TREAT KNEECAP FRACTURE: CPT | Performed by: ORTHOPAEDIC SURGERY

## 2022-12-12 PROCEDURE — 3700000001 HC ADD 15 MINUTES (ANESTHESIA): Performed by: ORTHOPAEDIC SURGERY

## 2022-12-12 PROCEDURE — 80048 BASIC METABOLIC PNL TOTAL CA: CPT

## 2022-12-12 PROCEDURE — 85025 COMPLETE CBC W/AUTO DIFF WBC: CPT

## 2022-12-12 PROCEDURE — 6360000002 HC RX W HCPCS: Performed by: ANESTHESIOLOGY

## 2022-12-12 PROCEDURE — 0QSF04Z REPOSITION LEFT PATELLA WITH INTERNAL FIXATION DEVICE, OPEN APPROACH: ICD-10-PCS | Performed by: ORTHOPAEDIC SURGERY

## 2022-12-12 PROCEDURE — 2709999900 HC NON-CHARGEABLE SUPPLY: Performed by: ORTHOPAEDIC SURGERY

## 2022-12-12 PROCEDURE — 7100000000 HC PACU RECOVERY - FIRST 15 MIN: Performed by: ORTHOPAEDIC SURGERY

## 2022-12-12 PROCEDURE — 36415 COLL VENOUS BLD VENIPUNCTURE: CPT

## 2022-12-12 PROCEDURE — 3600000015 HC SURGERY LEVEL 5 ADDTL 15MIN: Performed by: ORTHOPAEDIC SURGERY

## 2022-12-12 PROCEDURE — 2500000003 HC RX 250 WO HCPCS: Performed by: STUDENT IN AN ORGANIZED HEALTH CARE EDUCATION/TRAINING PROGRAM

## 2022-12-12 PROCEDURE — C1713 ANCHOR/SCREW BN/BN,TIS/BN: HCPCS | Performed by: ORTHOPAEDIC SURGERY

## 2022-12-12 PROCEDURE — 2500000003 HC RX 250 WO HCPCS: Performed by: NURSE ANESTHETIST, CERTIFIED REGISTERED

## 2022-12-12 PROCEDURE — 3700000000 HC ANESTHESIA ATTENDED CARE: Performed by: ORTHOPAEDIC SURGERY

## 2022-12-12 PROCEDURE — 27405 REPAIR OF KNEE LIGAMENT: CPT | Performed by: ORTHOPAEDIC SURGERY

## 2022-12-12 PROCEDURE — 2580000003 HC RX 258: Performed by: ORTHOPAEDIC SURGERY

## 2022-12-12 PROCEDURE — 6370000000 HC RX 637 (ALT 250 FOR IP)

## 2022-12-12 PROCEDURE — 73560 X-RAY EXAM OF KNEE 1 OR 2: CPT

## 2022-12-12 PROCEDURE — 0LQR0ZZ REPAIR LEFT KNEE TENDON, OPEN APPROACH: ICD-10-PCS | Performed by: ORTHOPAEDIC SURGERY

## 2022-12-12 PROCEDURE — 3600000005 HC SURGERY LEVEL 5 BASE: Performed by: ORTHOPAEDIC SURGERY

## 2022-12-12 PROCEDURE — 6360000002 HC RX W HCPCS: Performed by: NURSE ANESTHETIST, CERTIFIED REGISTERED

## 2022-12-12 PROCEDURE — 2500000003 HC RX 250 WO HCPCS: Performed by: ANESTHESIOLOGY

## 2022-12-12 DEVICE — DEVICE GRFT FIX 4.75X19.1 MM BIOCOMPOSITE SWIVELOCK: Type: IMPLANTABLE DEVICE | Site: LEG | Status: FUNCTIONAL

## 2022-12-12 RX ORDER — SODIUM CHLORIDE, SODIUM LACTATE, POTASSIUM CHLORIDE, CALCIUM CHLORIDE 600; 310; 30; 20 MG/100ML; MG/100ML; MG/100ML; MG/100ML
INJECTION, SOLUTION INTRAVENOUS CONTINUOUS
Status: DISCONTINUED | OUTPATIENT
Start: 2022-12-12 | End: 2022-12-13

## 2022-12-12 RX ORDER — LIDOCAINE HYDROCHLORIDE 10 MG/ML
INJECTION, SOLUTION EPIDURAL; INFILTRATION; INTRACAUDAL; PERINEURAL PRN
Status: DISCONTINUED | OUTPATIENT
Start: 2022-12-12 | End: 2022-12-12 | Stop reason: SDUPTHER

## 2022-12-12 RX ORDER — FENTANYL CITRATE 50 UG/ML
50 INJECTION, SOLUTION INTRAMUSCULAR; INTRAVENOUS EVERY 5 MIN PRN
Status: DISCONTINUED | OUTPATIENT
Start: 2022-12-12 | End: 2022-12-12 | Stop reason: HOSPADM

## 2022-12-12 RX ORDER — SODIUM CHLORIDE 0.9 % (FLUSH) 0.9 %
5-40 SYRINGE (ML) INJECTION EVERY 12 HOURS SCHEDULED
Status: DISCONTINUED | OUTPATIENT
Start: 2022-12-12 | End: 2022-12-12 | Stop reason: HOSPADM

## 2022-12-12 RX ORDER — MAGNESIUM HYDROXIDE 1200 MG/15ML
LIQUID ORAL CONTINUOUS PRN
Status: DISCONTINUED | OUTPATIENT
Start: 2022-12-12 | End: 2022-12-12 | Stop reason: HOSPADM

## 2022-12-12 RX ORDER — CLINDAMYCIN PHOSPHATE 900 MG/50ML
900 INJECTION INTRAVENOUS
Status: COMPLETED | OUTPATIENT
Start: 2022-12-12 | End: 2022-12-12

## 2022-12-12 RX ORDER — GLYCOPYRROLATE 0.2 MG/ML
INJECTION INTRAMUSCULAR; INTRAVENOUS PRN
Status: DISCONTINUED | OUTPATIENT
Start: 2022-12-12 | End: 2022-12-12 | Stop reason: SDUPTHER

## 2022-12-12 RX ORDER — KETOROLAC TROMETHAMINE 30 MG/ML
INJECTION, SOLUTION INTRAMUSCULAR; INTRAVENOUS PRN
Status: DISCONTINUED | OUTPATIENT
Start: 2022-12-12 | End: 2022-12-12 | Stop reason: SDUPTHER

## 2022-12-12 RX ORDER — ROCURONIUM BROMIDE 10 MG/ML
INJECTION, SOLUTION INTRAVENOUS PRN
Status: DISCONTINUED | OUTPATIENT
Start: 2022-12-12 | End: 2022-12-12 | Stop reason: SDUPTHER

## 2022-12-12 RX ORDER — SODIUM CHLORIDE 9 MG/ML
INJECTION, SOLUTION INTRAVENOUS PRN
Status: DISCONTINUED | OUTPATIENT
Start: 2022-12-12 | End: 2022-12-12 | Stop reason: HOSPADM

## 2022-12-12 RX ORDER — PROPOFOL 10 MG/ML
INJECTION, EMULSION INTRAVENOUS PRN
Status: DISCONTINUED | OUTPATIENT
Start: 2022-12-12 | End: 2022-12-12 | Stop reason: SDUPTHER

## 2022-12-12 RX ORDER — SODIUM CHLORIDE 0.9 % (FLUSH) 0.9 %
5-40 SYRINGE (ML) INJECTION PRN
Status: DISCONTINUED | OUTPATIENT
Start: 2022-12-12 | End: 2022-12-12 | Stop reason: HOSPADM

## 2022-12-12 RX ORDER — DIPHENHYDRAMINE HYDROCHLORIDE 50 MG/ML
12.5 INJECTION INTRAMUSCULAR; INTRAVENOUS
Status: DISCONTINUED | OUTPATIENT
Start: 2022-12-12 | End: 2022-12-12 | Stop reason: HOSPADM

## 2022-12-12 RX ORDER — FENTANYL CITRATE 50 UG/ML
INJECTION, SOLUTION INTRAMUSCULAR; INTRAVENOUS PRN
Status: DISCONTINUED | OUTPATIENT
Start: 2022-12-12 | End: 2022-12-12 | Stop reason: SDUPTHER

## 2022-12-12 RX ORDER — SODIUM CHLORIDE 9 MG/ML
INJECTION INTRAVENOUS PRN
Status: DISCONTINUED | OUTPATIENT
Start: 2022-12-12 | End: 2022-12-12

## 2022-12-12 RX ORDER — FENTANYL CITRATE 50 UG/ML
25 INJECTION, SOLUTION INTRAMUSCULAR; INTRAVENOUS EVERY 5 MIN PRN
Status: DISCONTINUED | OUTPATIENT
Start: 2022-12-12 | End: 2022-12-12 | Stop reason: HOSPADM

## 2022-12-12 RX ORDER — ONDANSETRON 2 MG/ML
4 INJECTION INTRAMUSCULAR; INTRAVENOUS
Status: DISCONTINUED | OUTPATIENT
Start: 2022-12-12 | End: 2022-12-12 | Stop reason: HOSPADM

## 2022-12-12 RX ORDER — DEXAMETHASONE SODIUM PHOSPHATE 10 MG/ML
INJECTION INTRAMUSCULAR; INTRAVENOUS PRN
Status: DISCONTINUED | OUTPATIENT
Start: 2022-12-12 | End: 2022-12-12 | Stop reason: SDUPTHER

## 2022-12-12 RX ORDER — CLINDAMYCIN PHOSPHATE 900 MG/50ML
900 INJECTION INTRAVENOUS EVERY 8 HOURS
Status: COMPLETED | OUTPATIENT
Start: 2022-12-12 | End: 2022-12-13

## 2022-12-12 RX ORDER — NEOSTIGMINE METHYLSULFATE 5 MG/5 ML
SYRINGE (ML) INTRAVENOUS PRN
Status: DISCONTINUED | OUTPATIENT
Start: 2022-12-12 | End: 2022-12-12 | Stop reason: SDUPTHER

## 2022-12-12 RX ORDER — PHENYLEPHRINE HYDROCHLORIDE 10 MG/ML
INJECTION INTRAVENOUS PRN
Status: DISCONTINUED | OUTPATIENT
Start: 2022-12-12 | End: 2022-12-12 | Stop reason: SDUPTHER

## 2022-12-12 RX ORDER — ONDANSETRON 2 MG/ML
INJECTION INTRAMUSCULAR; INTRAVENOUS PRN
Status: DISCONTINUED | OUTPATIENT
Start: 2022-12-12 | End: 2022-12-12 | Stop reason: SDUPTHER

## 2022-12-12 RX ADMIN — SODIUM CHLORIDE, POTASSIUM CHLORIDE, SODIUM LACTATE AND CALCIUM CHLORIDE: 600; 310; 30; 20 INJECTION, SOLUTION INTRAVENOUS at 13:01

## 2022-12-12 RX ADMIN — ROCURONIUM BROMIDE 50 MG: 10 INJECTION, SOLUTION INTRAVENOUS at 14:24

## 2022-12-12 RX ADMIN — CLINDAMYCIN PHOSPHATE 900 MG: 900 INJECTION, SOLUTION INTRAVENOUS at 14:39

## 2022-12-12 RX ADMIN — OXYCODONE 5 MG: 5 TABLET ORAL at 08:50

## 2022-12-12 RX ADMIN — GABAPENTIN 300 MG: 300 CAPSULE ORAL at 06:30

## 2022-12-12 RX ADMIN — ONDANSETRON 4 MG: 2 INJECTION INTRAMUSCULAR; INTRAVENOUS at 16:01

## 2022-12-12 RX ADMIN — ATORVASTATIN CALCIUM 20 MG: 20 TABLET, FILM COATED ORAL at 08:51

## 2022-12-12 RX ADMIN — FENTANYL CITRATE 100 MCG: 50 INJECTION, SOLUTION INTRAMUSCULAR; INTRAVENOUS at 14:24

## 2022-12-12 RX ADMIN — KETOROLAC TROMETHAMINE 30 MG: 30 INJECTION, SOLUTION INTRAMUSCULAR; INTRAVENOUS at 16:15

## 2022-12-12 RX ADMIN — POTASSIUM BICARBONATE 40 MEQ: 782 TABLET, EFFERVESCENT ORAL at 20:30

## 2022-12-12 RX ADMIN — FENTANYL CITRATE 50 MCG: 50 INJECTION, SOLUTION INTRAMUSCULAR; INTRAVENOUS at 16:22

## 2022-12-12 RX ADMIN — Medication 3 MG: at 16:01

## 2022-12-12 RX ADMIN — SODIUM CHLORIDE, POTASSIUM CHLORIDE, SODIUM LACTATE AND CALCIUM CHLORIDE: 600; 310; 30; 20 INJECTION, SOLUTION INTRAVENOUS at 14:15

## 2022-12-12 RX ADMIN — HYDRALAZINE HYDROCHLORIDE 25 MG: 25 TABLET, FILM COATED ORAL at 08:51

## 2022-12-12 RX ADMIN — METHOCARBAMOL TABLETS 750 MG: 750 TABLET, COATED ORAL at 08:49

## 2022-12-12 RX ADMIN — CLINDAMYCIN PHOSPHATE 900 MG: 900 INJECTION, SOLUTION INTRAVENOUS at 18:24

## 2022-12-12 RX ADMIN — CARVEDILOL 6.25 MG: 6.25 TABLET, FILM COATED ORAL at 08:49

## 2022-12-12 RX ADMIN — ACETAMINOPHEN 1000 MG: 500 TABLET ORAL at 05:38

## 2022-12-12 RX ADMIN — GABAPENTIN 300 MG: 300 CAPSULE ORAL at 23:17

## 2022-12-12 RX ADMIN — HYDRALAZINE HYDROCHLORIDE 25 MG: 25 TABLET, FILM COATED ORAL at 21:20

## 2022-12-12 RX ADMIN — AMLODIPINE BESYLATE 10 MG: 10 TABLET ORAL at 08:49

## 2022-12-12 RX ADMIN — DEXAMETHASONE SODIUM PHOSPHATE 10 MG: 10 INJECTION INTRAMUSCULAR; INTRAVENOUS at 14:45

## 2022-12-12 RX ADMIN — PHENYLEPHRINE HYDROCHLORIDE 100 MCG: 10 INJECTION INTRAVENOUS at 15:17

## 2022-12-12 RX ADMIN — LIDOCAINE HYDROCHLORIDE 50 MG: 10 INJECTION, SOLUTION EPIDURAL; INFILTRATION; INTRACAUDAL; PERINEURAL at 14:24

## 2022-12-12 RX ADMIN — GLYCOPYRROLATE 0.4 MG: 0.2 INJECTION INTRAMUSCULAR; INTRAVENOUS at 16:01

## 2022-12-12 RX ADMIN — SODIUM CHLORIDE, PRESERVATIVE FREE 10 ML: 5 INJECTION INTRAVENOUS at 08:51

## 2022-12-12 RX ADMIN — SODIUM CHLORIDE 1000 ML: 9 INJECTION, SOLUTION INTRAVENOUS at 00:01

## 2022-12-12 RX ADMIN — PHENYLEPHRINE HYDROCHLORIDE 100 MCG: 10 INJECTION INTRAVENOUS at 15:01

## 2022-12-12 RX ADMIN — FENTANYL CITRATE 50 MCG: 50 INJECTION, SOLUTION INTRAMUSCULAR; INTRAVENOUS at 16:26

## 2022-12-12 RX ADMIN — PROPOFOL 200 MG: 10 INJECTION, EMULSION INTRAVENOUS at 14:24

## 2022-12-12 RX ADMIN — METHOCARBAMOL TABLETS 750 MG: 750 TABLET, COATED ORAL at 21:20

## 2022-12-12 RX ADMIN — ACETAMINOPHEN 1000 MG: 500 TABLET ORAL at 21:21

## 2022-12-12 RX ADMIN — OXYCODONE 5 MG: 5 TABLET ORAL at 18:26

## 2022-12-12 ASSESSMENT — PAIN DESCRIPTION - LOCATION
LOCATION: KNEE
LOCATION: LEG
LOCATION: KNEE;LEG
LOCATION: LEG
LOCATION: KNEE

## 2022-12-12 ASSESSMENT — PAIN DESCRIPTION - DESCRIPTORS
DESCRIPTORS: SHARP
DESCRIPTORS: ACHING;DISCOMFORT
DESCRIPTORS: ACHING
DESCRIPTORS: ACHING;DISCOMFORT

## 2022-12-12 ASSESSMENT — PAIN - FUNCTIONAL ASSESSMENT
PAIN_FUNCTIONAL_ASSESSMENT: 0-10
PAIN_FUNCTIONAL_ASSESSMENT: PREVENTS OR INTERFERES SOME ACTIVE ACTIVITIES AND ADLS

## 2022-12-12 ASSESSMENT — PAIN DESCRIPTION - PAIN TYPE
TYPE: ACUTE PAIN;CHRONIC PAIN
TYPE: ACUTE PAIN;CHRONIC PAIN

## 2022-12-12 ASSESSMENT — PAIN SCALES - GENERAL
PAINLEVEL_OUTOF10: 3
PAINLEVEL_OUTOF10: 10
PAINLEVEL_OUTOF10: 3
PAINLEVEL_OUTOF10: 7
PAINLEVEL_OUTOF10: 4

## 2022-12-12 ASSESSMENT — PAIN DESCRIPTION - FREQUENCY
FREQUENCY: CONTINUOUS
FREQUENCY: CONTINUOUS

## 2022-12-12 ASSESSMENT — PAIN DESCRIPTION - ORIENTATION
ORIENTATION: LEFT;RIGHT
ORIENTATION: LEFT
ORIENTATION: RIGHT;LEFT

## 2022-12-12 ASSESSMENT — PAIN DESCRIPTION - ONSET
ONSET: ON-GOING
ONSET: ON-GOING

## 2022-12-12 NOTE — PROGRESS NOTES
[facility]  Progress Note                   Date:   12/12/2022  Patient name: Maria Victoria Gilliland  Date of admission:  12/10/2022  9:48 AM  MRN:   [id number]  YOB: 1960  PCP: DEMARCUS Stein CNP    Reason for Admission: Avulsion fracture [T14. 8XXA]  Closed displaced fracture of left patella, unspecified fracture morphology, initial encounter [S82.002A]    Subjective:       Clinical Changes /Abnormalities: Gaetano Rudolph was seen and examined in room. She denies chest pain, shortness of breath, dizziness and palpitations. No acute CV issues or concerns overnight. Labs/vitals/tele reviewed. Discussed with RN. Review of Systems    Medications:   Scheduled Meds:   clindamycin (CLEOCIN) IV  900 mg IntraVENous On Call to OR    carvedilol  6.25 mg Oral BID WC    amLODIPine  10 mg Oral Daily    atorvastatin  20 mg Oral Daily    hydrALAZINE  25 mg Oral BID    sodium chloride flush  5-40 mL IntraVENous 2 times per day    acetaminophen  1,000 mg Oral 3 times per day    methocarbamol  750 mg Oral TID    gabapentin  300 mg Oral TID     Continuous Infusions:   sodium chloride       CBC:   Recent Labs     12/10/22  1257 12/11/22  0301 12/12/22  0815   WBC 15.7* 6.6 8.4   HGB 12.1 9.8* 9.6*    212 196     BMP:    Recent Labs     12/10/22  1257 12/11/22  0301 12/12/22  0815    138 134*   K 3.7 3.7 3.6*    101 102   CO2 25 25 24   BUN 15 13 9   CREATININE 0.66 0.63 0.53   GLUCOSE 96 96 83     Hepatic:No results for input(s): AST, ALT, ALB, BILITOT, ALKPHOS in the last 72 hours. Troponin: No results for input(s): TROPHS in the last 72 hours. BNP: No results for input(s): BNP in the last 72 hours. Lipids: No results for input(s): CHOL, HDL in the last 72 hours.     Invalid input(s): LDLCALCU  INR:   Recent Labs     12/10/22  1744   INR 1.0       Objective:   Vitals: /69   Pulse 93   Temp 98.4 °F (36.9 °C) (Oral)   Resp 19   Ht 5' 2\" (1.575 m)   Wt 293 lb 14 oz (133.3 kg) SpO2 98%   BMI 53.75 kg/m²   General appearance: alert and cooperative with exam  HEENT: Head: Normocephalic, no lesions, without obvious abnormality. Neck:no JVD, trachea midline, no adenopathy  Lungs: Clear to auscultation  Heart: Regular rate and rhythm, s1/s2 auscultated, no murmurs  Abdomen: soft, non-tender, bowel sounds active  Extremities: no edema  Neurologic: not done        Assessment / Acute Cardiac Problems:   Mechanical fall from standing height   Left patellar avulsion fracture, plan for repair 12/12/2022, preop clearance   HTN  HLD  CVA   Obesity     Patient Active Problem List:     Syncope     Osteoarthritis     History of CVA (cerebrovascular accident)     Essential hypertension     Vertigo     Chronic allergic rhinitis     Morbid obesity with BMI of 50.0-59.9, adult (HCC)     Mixed hyperlipidemia     Avulsion fracture      Plan of Treatment:   ECHO was reviewed with Dr. Karan Vasquez by Dr. Taylor Parisi. Per note patient is moderate risk for surgery. Hypertension and ST resolved. Current SR. Continue Norvasc, hydralazine and BB. Continue statin. Continue ASA per primary. Keep K > 4 and Mag >2.      Electronically signed by DEMARCUS Pickard CNP on 12/12/2022 at 11:44 AM  [address]  [phone]

## 2022-12-12 NOTE — DISCHARGE INSTRUCTIONS
Discharge Instructions for Trauma         What to do after you leave the hospital:    Please continue to use your Incentive Spirometer as directed. You can practice 10 deep breaths/hour while awake. Using the [address] will promote the health of your lungs by taking slow, deep breaths in. It is also important in preventing pneumonia or a pneumothorax from developing. General questions or concerns may be called to the trauma nurse line at [phone] and please leave a message. Trauma is a life-threatening condition. Your doctor will want to closely monitor you. Be sure to go to all of your appointments. Orthopedic Instructions:  -Weight bearing status: as tolerated left leg in knee immobilizer  -Maintain knee immobilizer. No bending of left knee. -Keep dressing clean and dry. -Dress with plastic bag and rubber band to seal and repeat with second bag and rubber band when showering. \"Press & Seal\" wrap also works for sealing the rubber bag when showering.  -Starting 3 days after surgery, Ok for daily dressing changes until wound is dry. Then leave open to air. if wound is no longer leaking, Ok to shower but no soaks or baths.  -Physical Therapy for strengthening and gait training. Occupational therapy for activities of daily living.  -Ice (20 minutes on and off 1 hour) and elevate (above heart) as needed for swelling/pain  -Drink plenty of fluids.  -Call the office or come to Emergency Room if signs of infection appear (hot, swollen, red, draining pus, fever)  -Take medications as prescribed.  -Wean off narcotics (percocet/norco) as soon as possible. Do not take tylenol if still taking narcotics. -No alcoholic beverages or driving/operating machinery while on narcotics  -Follow up with ortho trauma clinic 12/28/22 at 1:40pm. Call [phone]  to confirm.

## 2022-12-12 NOTE — BRIEF OP NOTE
Brief Postoperative Note      Patient: Maria Victoria Gilliland  YOB: 1960  MRN: [id number]    Date of Procedure: 12/12/2022    Pre-Op Diagnosis: Left patella fracture    Post-Op Diagnosis:  Left patella fracture       Procedure(s):  Open treatment of left patella fracture  Repair of left knee retinaculum    Surgeon(s):  Reina Dumont DO    Assistant:  Resident: Otf Mclain DO    Anesthesia: General    Estimated Blood Loss (mL): 20 cc    IVF: 1828 cc    Complications: None    Specimens:   * No specimens in log *    Implants:  Fiberwire suture      Drains:   External Urinary Catheter (Active)   Site Assessment Clean,dry & intact 12/12/22 1141   Placement Repositioned 12/12/22 1141   Securement Method Other (Comment) 12/11/22 2100   Catheter Care Catheter/Wick replaced 12/11/22 0841   Perineal Care Yes 12/12/22 1141   Suction 40 mmgHg continuous 12/12/22 1141   Urine Color Yellow 12/12/22 1141   Urine Appearance Clear 12/12/22 1141   Output (mL) 500 mL 12/12/22 1054       Findings: L superior pole patella fx, very poor bone    Electronically signed by Reina Dumont DO on 12/12/2022 at 4:14 PM

## 2022-12-12 NOTE — OP NOTE
Operative Note      Patient: Gini Burden  YOB: 1960  MRN: [id number]    Date of Procedure: 12/12/2022    Pre-Op Diagnosis: Left patella fracture    Post-Op Diagnosis: Left patella fracture       Procedure(s):  Open treatment of left patella fracture  Repair of left knee retinaculum    Surgeon(s):  Margarita Gomes DO    Assistant:  Resident: Lzu Melendez DO    Anesthesia: General    Estimated Blood Loss (mL): 20 cc    IVF: 2729 cc    Complications: None    Specimens:   * No specimens in log *    Implants:  Fiberwire suture    Indications: This is a 60 y.o. female who sustained a left superior pole patella fracture. We discussed the nature of the injury as well as the indications for surgical intervention as well as the associated risks, benefits, and alternatives of the procedure. The patient stated clear understanding, was willing to proceed, provided written informed consent, and had her operative site marked. The patient received appropriate preoperative clearance/risk stratification from the primary and/or consulting services. Procedure: The patient was transported to the operating room and general anesthesia was administered by the anesthesia providers without complication. The patient was then transferred to a cantilever type table in a supine position. The left lower extremity was isolated, scrubbed with Hibiclens followed by alcohol, and prepped and draped in the usual sterile fashion. A timeout was performed that included all involved parties and correctly identified the patient, planned, procedure, and operative site. After everyone agreed we continued. A serous blister was noted directly over the patella in the anterior skin. An anterior midline incision was then completed, carefully curving around the blister in its midportion. Hematoma was evacuated. Patella fracture was noted. There was extensive rupture of the medial and lateral retinaculum also apparent.  Joint hematoma was evacuated. The patella was then drilled to accept Arthrex 4.75 mm SwivelLock anchors. 2 #2 FiberTape sutures were passed in a Krackow fashion through the quad tendon and attached superior pole fragment. The FiberTapes were then loaded through the SwivelLocks. Both anchors pulled out immediately after inserting and had no purchase whatsoever. We then converted to a bone tunnel technique. The tails were passed through a Beath needle and retrieved from the patellar tendon. Each tail was tied to its match after fully tensioning with the knee in full extension. Tails were cut and buried under the patellar tendon fibers. The fascia over the fracture site was repaired with #5 FiberWire. The medial and lateral retinacula were then independently repaired with #5 FiberWire. To reinforce and protect the repair, we added a #5 FiberWire in a cerclage suture fashion. The incisions were copiously irrigated with normal saline. The incisions were closed in a standard layered fashion with absorbable suture. The skin was approximated with staples. The field was cleaned and dried and sterile bandages were applied. The patient was successfully extubated by the anesthesia providers with no known complications and transferred to the recovery room. Postoperative plan: We will obtain digital x-rays of the operative site in the recovery room. The patient will be placed on 23 hours of prophylactic antibiotics and may resume DVT prophylaxis on postoperative day 1 if felt to be appropriate by the primary service. She will be instructed to be weightbearing as tolerated in knee immobilizer after surgery. She is instructed to wear the brace at all times until notified otherwise. She will be evaluated by Physical and Occupational Therapy.   Following discharge from the hospital the patient will be evaluated in the orthopedic trauma clinic approximately 10 to 14 days from the date of today's procedure for a wound check and anticipated staple removal if healing appropriately at that time.      Electronically signed by Derek Ghosh DO on 12/12/2022 at 4:19 PM

## 2022-12-12 NOTE — PROGRESS NOTES
PROGRESS NOTE          PATIENT NAME: Musa Berman  MEDICAL RECORD NO. [id number]  DATE: 2022  SURGEON: Esequiel Pablo  PRIMARY CARE PHYSICIAN: Kirsten Torres, DEMARCUS - CNP    HD: # 2    ASSESSMENT    Patient Active Problem List   Diagnosis    Syncope    Osteoarthritis    History of CVA (cerebrovascular accident)    Essential hypertension    Vertigo    Chronic allergic rhinitis    Morbid obesity with BMI of 50.0-59.9, adult ([address]    Mixed hyperlipidemia    Avulsion fracture       MEDICAL DECISION MAKING AND PLAN    58-year-old female with PMH including history of stroke \"in \" with no residual deficits and HTN who originally presented to the ED on 12/10/2022 after a mechanical fall where her purse became wrapped around her walker causing her to fall down 4-5 stairs inside her house. She suffered a left 2.4 cm superiorly displaced avulsion fracture at the upper pole of the patella due to the fall. Patellar fracture  -Plan for OR today with Dr. Tabatha Slaughter for quadriceps tendon/superior patella avulsion fracture repair. Follow-up echo results and cardiology clearance prior to OR. Pre-op labs this morning pending  -Below average surgical risk (see below). EKG with sinus tachycardia  -NWB LLE. Keep knee immobilizer on please  -Ancef  - holding pharm AC until POD#1  - PT/OT after surgery  - pain control        Chief Complaint: \"doing fine\"    SUBJECTIVE    Patient seen resting comfortably in bed. She is awaiting surgery with Ortho later today. Echo and cardiology clearance pending. Of note, she mentions a small blister just below one of the straps on the knee brace on her lateral L knee. No acute events overnight.     OBJECTIVE  VITALS: Temp: Temp: 98.7 °F (37.1 °C)Temp  Av.3 °F (36.8 °C)  Min: 97.3 °F (36.3 °C)  Max: 98.7 °F (25.9 °C) BP Systolic (03HDW), MJH:529 , Min:127 , FXS:268   Diastolic (63JVZ), LKP:30, Min:68, Max:90   Pulse Pulse  Av.2  Min: 83  Max: 98 Resp Resp  Avg: 15.9  Min: 11  Max: 25 Pulse ox SpO2  Av.9 %  Min: 94 %  Max: 100 %  Constitutional:       General: She is not in acute distress. Appearance: She is obese. She is not ill-appearing. HENT:      Head: Normocephalic and atraumatic. Right Ear: External ear normal.      Left Ear: External ear normal.      Nose: Nose normal. No congestion. Mouth/Throat:      Mouth: Mucous membranes are moist.      Pharynx: Oropharynx is clear. Eyes:      General: No scleral icterus. Extraocular Movements: Extraocular movements intact. Pupils: Pupils are equal, round, and reactive to light. Cardiovascular:      Rate and Rhythm: Normal rate and regular rhythm. Pulses: Normal pulses. Heart sounds: Normal heart sounds. Pulmonary:      Effort: Pulmonary effort is normal. No respiratory distress. Breath sounds: Normal breath sounds. Abdominal:      General: There is no distension. Palpations: Abdomen is soft. Tenderness: There is no abdominal tenderness. There is no guarding. Musculoskeletal:         General: Tenderness and signs of injury present. No deformity. Cervical back: Neck supple. Comments: Left LE tenderness to palpation. LLE in brace   Skin:     General: Skin is warm and dry. Neurological:      Mental Status: She is oriented to person, place, and time. Mental status is at baseline. Sensory: No sensory deficit. Motor: No weakness. Psychiatric:         Mood and Affect: Mood normal.         Behavior: Behavior normal.         Thought Content: Thought content normal.         Judgment: Judgment normal.        I/O last 3 completed shifts: In: 884.7 [P.O.:360; I.V.:524.7]  Out: 9777 [Urine:1825]    Drain/tube output:   In: 3386.1 [P.O.:1080; I.V.:2306.1]  Out: 2425 [Urine:2425]    LAB:  CBC:   Recent Labs     12/10/22  1257 22  0301   WBC 15.7* 6.6   HGB 12.1 9.8*   HCT 38.2 30.9*   MCV 85.7 86.1    212       BMP:   Recent Labs     12/10/22  1257 22  0301  138   K 3.7 3.7    101   CO2 25 25   BUN 15 13   CREATININE 0.66 0.63   GLUCOSE 96 96       COAGS:   Recent Labs     12/10/22  1744   APTT 23.6   INR 1.0         RADIOLOGY:  XR FEMUR LEFT (MIN 2 VIEWS)    Result Date: 12/10/2022  Left shoulder: 1. Mild degenerative changes as above. 2. No acute fracture or dislocation. Left femur: 1. Diffuse osteopenia. No acute osseous abnormality. 2. Tricompartmental osteoarthrosis of the left knee. Left knee: 1. Suspected age-indeterminate fracture at the upper pole of the patella. 2. Tricompartmental osteoarthrosis. Severe degenerative changes of the medial compartment. Left tib fib: 1. Suspected superiorly displaced avulsion fractures at the upper pole of the patella superiorly displaced by 2.4 cm. Small suprapatellar joint effusion. 2. Moderate degenerative changes as detailed above. 3. Tibia and fibula appear intact. 4. Mild-to-moderate soft tissue edema of the left leg and ankle. XR KNEE LEFT (1-2 VIEWS)    Result Date: 12/10/2022  Age-indeterminate fracture and/or bony resorption of the upper pole of the patella. Small joint effusion. Moderate edema. Underlying infection/osteomyelitis not excluded on the basis of this study in the appropriate clinical setting. XR KNEE LEFT (3 VIEWS)    Result Date: 12/10/2022  Left shoulder: 1. Mild degenerative changes as above. 2. No acute fracture or dislocation. Left femur: 1. Diffuse osteopenia. No acute osseous abnormality. 2. Tricompartmental osteoarthrosis of the left knee. Left knee: 1. Suspected age-indeterminate fracture at the upper pole of the patella. 2. Tricompartmental osteoarthrosis. Severe degenerative changes of the medial compartment. Left tib fib: 1. Suspected superiorly displaced avulsion fractures at the upper pole of the patella superiorly displaced by 2.4 cm. Small suprapatellar joint effusion. 2. Moderate degenerative changes as detailed above. 3. Tibia and fibula appear intact.  4. Mild-to-moderate soft tissue edema of the left leg and ankle. XR TIBIA FIBULA LEFT (2 VIEWS)    Result Date: 12/10/2022  Left shoulder: 1. Mild degenerative changes as above. 2. No acute fracture or dislocation. Left femur: 1. Diffuse osteopenia. No acute osseous abnormality. 2. Tricompartmental osteoarthrosis of the left knee. Left knee: 1. Suspected age-indeterminate fracture at the upper pole of the patella. 2. Tricompartmental osteoarthrosis. Severe degenerative changes of the medial compartment. Left tib fib: 1. Suspected superiorly displaced avulsion fractures at the upper pole of the patella superiorly displaced by 2.4 cm. Small suprapatellar joint effusion. 2. Moderate degenerative changes as detailed above. 3. Tibia and fibula appear intact. 4. Mild-to-moderate soft tissue edema of the left leg and ankle. XR SHOULDER LEFT (MIN 2 VIEWS)    Result Date: 12/10/2022  Left shoulder: 1. Mild degenerative changes as above. 2. No acute fracture or dislocation. Left femur: 1. Diffuse osteopenia. No acute osseous abnormality. 2. Tricompartmental osteoarthrosis of the left knee. Left knee: 1. Suspected age-indeterminate fracture at the upper pole of the patella. 2. Tricompartmental osteoarthrosis. Severe degenerative changes of the medial compartment. Left tib fib: 1. Suspected superiorly displaced avulsion fractures at the upper pole of the patella superiorly displaced by 2.4 cm. Small suprapatellar joint effusion. 2. Moderate degenerative changes as detailed above. 3. Tibia and fibula appear intact. 4. Mild-to-moderate soft tissue edema of the left leg and ankle. XR CHEST PORTABLE    Result Date: 12/10/2022  No acute focal airspace consolidation.          Reji Galvan DO  12/11/22, 6:56 AM        Attending Note      I have reviewed the above TECSS note(s) and I either performed the key elements of the medical history and physical exam or was present with the resident when the key elements of the medical history and physical exam were performed. I have discussed the findings, established the care plan and recommendations with Resident, TK RN, bedside nurse.     Karlie Nelson MD  12/12/2022  4:26 PM

## 2022-12-12 NOTE — PROGRESS NOTES
Orthopedic Progress Note    Patient:  Delilah Carl  YOB: 1960     60 y.o. female    Subjective:  Patient seen and examined, resting comfortably in bed. No complaints or concerns. No issue overnight. Pain controlled. Currently 3/10. Denies fever, HA, CP, SOB, N/V, dysuria. Plan for surgery today, pending cardiac clearance and reviewing the recent echocardiogram study. PT after surgery today. Vitals reviewed, afebrile    Objective:   Vitals:    12/12/22 0300   BP:    Pulse: 85   Resp: 15   Temp:    SpO2:      Gen: NAD, Cooperative. Cardiovascular: Regular Rate  Respiratory: No Acute Respiratory Distress  MSK:  LLE   Knee immobilizer on. Skin intact. Tender about anterior knee. Compartments soft and easily compressible. EHL/FHL/TA/GSC Motor Intact. Sural, Saphenous, Superficial/Deep Peroneal, and Plantar Nerve Distribution SILT. DP and PT Pulses 2+. Recent Labs     12/10/22  1744 12/11/22  0301   WBC  --  6.6   HGB  --  9.8*   HCT  --  30.9*   PLT  --  212   INR 1.0  --    NA  --  138   K  --  3.7   BUN  --  13   CREATININE  --  0.63   GLUCOSE  --  96      Meds: See Rec for Complete List    Impression   60 y.o. female being seen after a fall from standing height with the following:     - Left superior pole of patella avulsion fracture   - Quadriceps tendon avulsion     Plan  - To OR today with Dr. JULIO [facility] pending cardiac clearance  - Knee immobilizer on, please maintain  - Pre-Op HgB 9.8 (12/11/22 @ 0301)  - Ancef OCTOR  - Patient NPO  - Appropriate side marked and consent is on file   - Pain control: Per primary team  - Medical Management: Per primary team  - DVT ppx: Please hold in anticipation for surgery; OK to resume POD#1 from orthopedic perspective  - Ice and Elevate for pain and swelling  - Encourage IS  - PT/OT to evaluate and treat after surgery  - Please page ortho with any questions  _________________________________  Geovanna Chapman D.O.   Orthopedic Surgery Resident, PGY-1  [facility], [place]    PGY-2 Addendum    Patient seen and examined. Agree with subjective and objective portions from Dr. Rashard Abrams with note adjusted where indicated. Patient is marked for surgery. All questions answered regarding timing and details.       Saray Reed DO PGY-2  Orthopedic Surgery Resident  [facility], [facility]

## 2022-12-12 NOTE — CARE COORDINATION
Trauma Coordinator Rounding Progress Notes     Sandra Brennan  [id number]  12/12/2022    Patient Concerns: none     RN Concerns: none    Work Notes: na     Wound Care: patient states she has a blister on her knee from the fall       Education: diet, npo for surgery  Achieved Volume (mL): 2500 mL                Bedrest     Dispo planning: pending PT/OT evals post operatively     Follow up: Primary Care Physician: DEMARCUS Bullard CNP;    DEMARCUS Bullard CNP  [address] [facility]  [address] [address],[address]  [address] [facility]  [phone]    Schedule an appointment as soon as possible for a visit  Follow up with PCP re: hospital visit    Romana Iba, DO  [address]  [address] [facility] [address] [facility]  [phone]    Follow up  Follow up with Orthopedic Surgery     Electronically signed by Jose Watts RN on 12/12/2022 at 1:16 PM

## 2022-12-12 NOTE — ANESTHESIA POSTPROCEDURE EVALUATION
Department of Anesthesiology  Postprocedure Note    Patient: Ann Villalobos  MRN: [id number]  YOB: 1960  Date of evaluation: 12/12/2022      Procedure Summary     Date: 12/12/22 Room / Location: [address]    Anesthesia Start: 7567 Anesthesia Stop: 0137    Procedure: QUAD TENDON REPAIR WITH TENDON AVULSION, [address], C-ARM (Left) Diagnosis:       Closed nondisplaced fracture of left patella, unspecified fracture morphology, initial encounter      (CLOSED LEFT PATELLA FRACTURE)    Surgeons: Lydia Leon DO Responsible Provider: Author MD Brisa    Anesthesia Type: general ASA Status: 3          Anesthesia Type: No value filed.     Angelita Phase I:      Angelita Phase II:        Anesthesia Post Evaluation    Patient location during evaluation: PACU  Patient participation: complete - patient participated  Level of consciousness: awake  Pain score: 1  Airway patency: patent  Nausea & Vomiting: no nausea and no vomiting  Complications: no  Cardiovascular status: blood pressure returned to baseline and hemodynamically stable  Respiratory status: acceptable  Hydration status: euvolemic

## 2022-12-12 NOTE — PLAN OF CARE
The 2D ECHO was reviewed and discussed with Dr. Navya Salvador, no evidence of systolic or diastolic dysfunction, or significant valvular abnormalities, she can proceed for the surgery as a moderate risk from Cardiology standpoint. RCRI 1.        Domonique Yoo MD  Fellow, [address]

## 2022-12-12 NOTE — PLAN OF CARE
Problem: Discharge Planning  Goal: Discharge to home or other facility with appropriate resources  12/11/2022 2358 by Lor Rizvi RN  Outcome: Progressing  12/11/2022 1711 by Jed Lesch  Outcome: Progressing     Problem: Pain  Goal: Verbalizes/displays adequate comfort level or baseline comfort level  12/11/2022 2358 by Lor Rizvi RN  Outcome: Progressing  12/11/2022 1711 by Jed Lesch  Outcome: Progressing     Problem: ABCDS Injury Assessment  Goal: Absence of physical injury  12/11/2022 2358 by Lor Rizvi RN  Outcome: Progressing  12/11/2022 1711 by Jed Lesch  Outcome: Progressing     Problem: Skin/Tissue Integrity  Goal: Absence of new skin breakdown  Description: 1. Monitor for areas of redness and/or skin breakdown  2. Assess vascular access sites hourly  3. Every 4-6 hours minimum:  Change oxygen saturation probe site  4. Every 4-6 hours:  If on nasal continuous positive airway pressure, respiratory therapy assess nares and determine need for appliance change or resting period.   12/11/2022 2358 by Lor Rizvi RN  Outcome: Progressing  12/11/2022 1711 by Jed Lesch  Outcome: Progressing     Problem: Safety - Adult  Goal: Free from fall injury  12/11/2022 2358 by Lor Rizvi RN  Outcome: Progressing  12/11/2022 1711 by Jed Lesch  Outcome: Progressing     Problem: Chronic Conditions and Co-morbidities  Goal: Patient's chronic conditions and co-morbidity symptoms are monitored and maintained or improved  12/11/2022 2358 by Lor Rizvi RN  Outcome: Progressing  12/11/2022 1711 by Jed Lesch  Outcome: Progressing

## 2022-12-13 LAB
ABSOLUTE EOS #: <0.03 K/UL (ref 0–0.44)
ABSOLUTE IMMATURE GRANULOCYTE: 0.08 K/UL (ref 0–0.3)
ABSOLUTE LYMPH #: 1.15 K/UL (ref 1.1–3.7)
ABSOLUTE MONO #: 0.78 K/UL (ref 0.1–1.2)
ANION GAP SERPL CALCULATED.3IONS-SCNC: 11 MMOL/L (ref 9–17)
BASOPHILS # BLD: 0 % (ref 0–2)
BASOPHILS ABSOLUTE: <0.03 K/UL (ref 0–0.2)
BUN BLDV-MCNC: 13 MG/DL (ref 8–23)
CALCIUM SERPL-MCNC: 8.6 MG/DL (ref 8.6–10.4)
CHLORIDE BLD-SCNC: 103 MMOL/L (ref 98–107)
CO2: 24 MMOL/L (ref 20–31)
CREAT SERPL-MCNC: 0.61 MG/DL (ref 0.5–0.9)
EOSINOPHILS RELATIVE PERCENT: 0 % (ref 1–4)
GFR SERPL CREATININE-BSD FRML MDRD: >60 ML/MIN/1.73M2
GLUCOSE BLD-MCNC: 120 MG/DL (ref 70–99)
HCT VFR BLD CALC: 27.9 % (ref 36.3–47.1)
HEMOGLOBIN: 9.2 G/DL (ref 11.9–15.1)
IMMATURE GRANULOCYTES: 1 %
LYMPHOCYTES # BLD: 9 % (ref 24–43)
MCH RBC QN AUTO: 28.3 PG (ref 25.2–33.5)
MCHC RBC AUTO-ENTMCNC: 33 G/DL (ref 28.4–34.8)
MCV RBC AUTO: 85.8 FL (ref 82.6–102.9)
MONOCYTES # BLD: 6 % (ref 3–12)
NRBC AUTOMATED: 0 PER 100 WBC
PDW BLD-RTO: 13 % (ref 11.8–14.4)
PLATELET # BLD: 220 K/UL (ref 138–453)
PMV BLD AUTO: 9.7 FL (ref 8.1–13.5)
POTASSIUM SERPL-SCNC: 4.5 MMOL/L (ref 3.7–5.3)
RBC # BLD: 3.25 M/UL (ref 3.95–5.11)
REASON FOR REJECTION: NORMAL
SEG NEUTROPHILS: 85 % (ref 36–65)
SEGMENTED NEUTROPHILS ABSOLUTE COUNT: 11.33 K/UL (ref 1.5–8.1)
SODIUM BLD-SCNC: 138 MMOL/L (ref 135–144)
WBC # BLD: 13.4 K/UL (ref 3.5–11.3)
ZZ NTE CLEAN UP: ORDERED TEST: NORMAL
ZZ NTE WITH NAME CLEAN UP: SPECIMEN SOURCE: NORMAL

## 2022-12-13 PROCEDURE — 6370000000 HC RX 637 (ALT 250 FOR IP): Performed by: STUDENT IN AN ORGANIZED HEALTH CARE EDUCATION/TRAINING PROGRAM

## 2022-12-13 PROCEDURE — 2500000003 HC RX 250 WO HCPCS: Performed by: STUDENT IN AN ORGANIZED HEALTH CARE EDUCATION/TRAINING PROGRAM

## 2022-12-13 PROCEDURE — 1200000000 HC SEMI PRIVATE

## 2022-12-13 PROCEDURE — 36415 COLL VENOUS BLD VENIPUNCTURE: CPT

## 2022-12-13 PROCEDURE — 97535 SELF CARE MNGMENT TRAINING: CPT

## 2022-12-13 PROCEDURE — 97530 THERAPEUTIC ACTIVITIES: CPT

## 2022-12-13 PROCEDURE — 80048 BASIC METABOLIC PNL TOTAL CA: CPT

## 2022-12-13 PROCEDURE — 97162 PT EVAL MOD COMPLEX 30 MIN: CPT

## 2022-12-13 PROCEDURE — 6360000002 HC RX W HCPCS: Performed by: STUDENT IN AN ORGANIZED HEALTH CARE EDUCATION/TRAINING PROGRAM

## 2022-12-13 PROCEDURE — 6370000000 HC RX 637 (ALT 250 FOR IP)

## 2022-12-13 PROCEDURE — 6370000000 HC RX 637 (ALT 250 FOR IP): Performed by: NURSE PRACTITIONER

## 2022-12-13 PROCEDURE — 85025 COMPLETE CBC W/AUTO DIFF WBC: CPT

## 2022-12-13 PROCEDURE — 97166 OT EVAL MOD COMPLEX 45 MIN: CPT

## 2022-12-13 RX ORDER — CARVEDILOL 6.25 MG/1
6.25 TABLET ORAL 2 TIMES DAILY WITH MEALS
Qty: 60 TABLET | Refills: 3 | Status: SHIPPED | OUTPATIENT
Start: 2022-12-14

## 2022-12-13 RX ORDER — POLYETHYLENE GLYCOL 3350 17 G/17G
17 POWDER, FOR SOLUTION ORAL DAILY
Qty: 527 G | Refills: 1 | Status: SHIPPED | OUTPATIENT
Start: 2022-12-14 | End: 2023-01-13

## 2022-12-13 RX ORDER — POLYETHYLENE GLYCOL 3350 17 G/17G
17 POWDER, FOR SOLUTION ORAL DAILY
Status: DISCONTINUED | OUTPATIENT
Start: 2022-12-13 | End: 2022-12-14 | Stop reason: HOSPADM

## 2022-12-13 RX ORDER — GABAPENTIN 300 MG/1
300 CAPSULE ORAL 3 TIMES DAILY
Qty: 90 CAPSULE | Refills: 0 | Status: SHIPPED | OUTPATIENT
Start: 2022-12-13 | End: 2022-12-23

## 2022-12-13 RX ORDER — SENNA PLUS 8.6 MG/1
1 TABLET ORAL NIGHTLY
Status: DISCONTINUED | OUTPATIENT
Start: 2022-12-13 | End: 2022-12-14 | Stop reason: HOSPADM

## 2022-12-13 RX ORDER — METHOCARBAMOL 750 MG/1
750 TABLET, FILM COATED ORAL 3 TIMES DAILY
Qty: 30 TABLET | Refills: 0 | Status: SHIPPED | OUTPATIENT
Start: 2022-12-13 | End: 2022-12-23

## 2022-12-13 RX ORDER — ENOXAPARIN SODIUM 100 MG/ML
30 INJECTION SUBCUTANEOUS 2 TIMES DAILY
Status: DISCONTINUED | OUTPATIENT
Start: 2022-12-13 | End: 2022-12-14 | Stop reason: HOSPADM

## 2022-12-13 RX ORDER — OXYCODONE HYDROCHLORIDE 5 MG/1
5 TABLET ORAL EVERY 6 HOURS PRN
Qty: 9 TABLET | Refills: 0 | Status: SHIPPED | OUTPATIENT
Start: 2022-12-13 | End: 2022-12-16

## 2022-12-13 RX ADMIN — OXYCODONE 5 MG: 5 TABLET ORAL at 14:36

## 2022-12-13 RX ADMIN — METHOCARBAMOL TABLETS 750 MG: 750 TABLET, COATED ORAL at 08:38

## 2022-12-13 RX ADMIN — GABAPENTIN 300 MG: 300 CAPSULE ORAL at 08:39

## 2022-12-13 RX ADMIN — CARVEDILOL 6.25 MG: 6.25 TABLET, FILM COATED ORAL at 08:40

## 2022-12-13 RX ADMIN — ENOXAPARIN SODIUM 30 MG: 100 INJECTION SUBCUTANEOUS at 21:30

## 2022-12-13 RX ADMIN — OXYCODONE 5 MG: 5 TABLET ORAL at 08:39

## 2022-12-13 RX ADMIN — METHOCARBAMOL TABLETS 750 MG: 750 TABLET, COATED ORAL at 14:37

## 2022-12-13 RX ADMIN — OXYCODONE 5 MG: 5 TABLET ORAL at 20:48

## 2022-12-13 RX ADMIN — HYDRALAZINE HYDROCHLORIDE 25 MG: 25 TABLET, FILM COATED ORAL at 08:40

## 2022-12-13 RX ADMIN — ACETAMINOPHEN 1000 MG: 500 TABLET ORAL at 14:36

## 2022-12-13 RX ADMIN — OXYCODONE 5 MG: 5 TABLET ORAL at 00:50

## 2022-12-13 RX ADMIN — CLINDAMYCIN PHOSPHATE 900 MG: 900 INJECTION, SOLUTION INTRAVENOUS at 11:41

## 2022-12-13 RX ADMIN — ACETAMINOPHEN 1000 MG: 500 TABLET ORAL at 05:42

## 2022-12-13 RX ADMIN — POLYETHYLENE GLYCOL 3350 17 G: 17 POWDER, FOR SOLUTION ORAL at 08:38

## 2022-12-13 RX ADMIN — GABAPENTIN 300 MG: 300 CAPSULE ORAL at 14:38

## 2022-12-13 RX ADMIN — METHOCARBAMOL TABLETS 750 MG: 750 TABLET, COATED ORAL at 20:48

## 2022-12-13 RX ADMIN — SENNA 8.6 MG: 8.6 TABLET, COATED ORAL at 23:16

## 2022-12-13 RX ADMIN — ACETAMINOPHEN 1000 MG: 500 TABLET ORAL at 22:30

## 2022-12-13 RX ADMIN — GABAPENTIN 300 MG: 300 CAPSULE ORAL at 22:30

## 2022-12-13 RX ADMIN — HYDRALAZINE HYDROCHLORIDE 25 MG: 25 TABLET, FILM COATED ORAL at 20:48

## 2022-12-13 RX ADMIN — ATORVASTATIN CALCIUM 20 MG: 20 TABLET, FILM COATED ORAL at 08:40

## 2022-12-13 RX ADMIN — CARVEDILOL 6.25 MG: 6.25 TABLET, FILM COATED ORAL at 16:10

## 2022-12-13 RX ADMIN — ENOXAPARIN SODIUM 30 MG: 100 INJECTION SUBCUTANEOUS at 11:40

## 2022-12-13 RX ADMIN — AMLODIPINE BESYLATE 10 MG: 10 TABLET ORAL at 08:40

## 2022-12-13 RX ADMIN — CLINDAMYCIN PHOSPHATE 900 MG: 900 INJECTION, SOLUTION INTRAVENOUS at 02:07

## 2022-12-13 ASSESSMENT — PAIN SCALES - GENERAL
PAINLEVEL_OUTOF10: 7
PAINLEVEL_OUTOF10: 7
PAINLEVEL_OUTOF10: 3
PAINLEVEL_OUTOF10: 0
PAINLEVEL_OUTOF10: 3
PAINLEVEL_OUTOF10: 4
PAINLEVEL_OUTOF10: 3

## 2022-12-13 ASSESSMENT — PAIN DESCRIPTION - DESCRIPTORS
DESCRIPTORS: THROBBING
DESCRIPTORS: SHARP
DESCRIPTORS: ACHING

## 2022-12-13 ASSESSMENT — PAIN - FUNCTIONAL ASSESSMENT: PAIN_FUNCTIONAL_ASSESSMENT: PREVENTS OR INTERFERES SOME ACTIVE ACTIVITIES AND ADLS

## 2022-12-13 ASSESSMENT — PAIN DESCRIPTION - ORIENTATION
ORIENTATION: LEFT

## 2022-12-13 ASSESSMENT — PAIN DESCRIPTION - LOCATION
LOCATION: LEG

## 2022-12-13 NOTE — PROGRESS NOTES
[facility]  Progress Note                   Date:   12/13/2022  Patient name: Gay Watts  Date of admission:  12/10/2022  9:48 AM  MRN:   [id number]  YOB: 1960  PCP: DEMARCUS Coley - CNP    Reason for Admission: Avulsion fracture [T14. 8XXA]  Closed displaced fracture of left patella, unspecified fracture morphology, initial encounter [S82.002A]    Subjective:       Clinical Changes /Abnormalities: Patient seen and examined. Denies chest pain or shortness of breath. Tele/vitals/labs reviewed . Discussed case with RN. No acute issues overnight  Review of Systems    Medications:   Scheduled Meds:   enoxaparin  30 mg SubCUTAneous BID    polyethylene glycol  17 g Oral Daily    clindamycin (CLEOCIN) IV  900 mg IntraVENous Q8H    carvedilol  6.25 mg Oral BID WC    amLODIPine  10 mg Oral Daily    atorvastatin  20 mg Oral Daily    hydrALAZINE  25 mg Oral BID    acetaminophen  1,000 mg Oral 3 times per day    methocarbamol  750 mg Oral TID    gabapentin  300 mg Oral TID     Continuous Infusions:      CBC:   Recent Labs     12/11/22  0301 12/12/22  0815 12/13/22  0558   WBC 6.6 8.4 13.4*   HGB 9.8* 9.6* 9.2*    196 220       BMP:    Recent Labs     12/11/22  0301 12/12/22  0815 12/13/22  0432    134* 138   K 3.7 3.6* 4.5    102 103   CO2 25 24 24   BUN 13 9 13   CREATININE 0.63 0.53 0.61   GLUCOSE 96 83 120*       Hepatic:No results for input(s): AST, ALT, ALB, BILITOT, ALKPHOS in the last 72 hours. Troponin: No results for input(s): TROPHS in the last 72 hours. BNP: No results for input(s): BNP in the last 72 hours. Lipids: No results for input(s): CHOL, HDL in the last 72 hours. Invalid input(s): LDLCALCU  INR:   Recent Labs     12/10/22  1744   INR 1.0        ECHO Summary  Global left ventricular systolic function is normal. Estimated LVEF > 65%. No obvious wall motion abnormality seen. No significant valvular regurgitation or stenosis seen.   No pericardial effusion seen. Normal diastolic filling. Signature  ----------------------------------------------------------------------------   Electronically signed by Humera Mike(Sonographer) on 12/11/2022   08:44 AM  ----------------------------------------------------------------------------     ----------------------------------------------------------------------------   Electronically signed by Hunter Estrada(Interpreting physician) on   12/12/2022 08:48 AM  ----------------------------------------------------------------------------    Objective:   Vitals: BP (!) 166/71   Pulse 92   Temp 98 °F (36.7 °C) (Oral)   Resp 16   Ht 5' 2\" (1.575 m)   Wt 293 lb 14 oz (133.3 kg)   SpO2 100%   BMI 53.75 kg/m²   General appearance: alert and cooperative with exam  HEENT: Head: Normocephalic, no lesions, without obvious abnormality. Neck:no JVD, trachea midline, no adenopathy  Lungs: Clear to auscultation  Heart: Regular rate and rhythm, s1/s2 auscultated, no murmurs  Abdomen: soft, non-tender, bowel sounds active  Neurologic: not done        Assessment / Acute Cardiac Problems:   Mechanical fall from standing height   Left patellar avulsion fracture s/p repair 12/12/2022, preop clearance   HTN  HLD  CVA   Obesity     Patient Active Problem List:     Syncope     Osteoarthritis     History of CVA (cerebrovascular accident)     Essential hypertension     Vertigo     Chronic allergic rhinitis     Morbid obesity with BMI of 50.0-59.9, adult (HCC)     Mixed hyperlipidemia     Avulsion fracture      Plan of Treatment:   Hypertension - Slightly elevated could be due to surgical pain -Current SR. Continue Norvasc, hydralazine and BB. ECHO reviewed as above   Continue statin. Continue ASA per primary.    Encouraged the use of IS   Cardiology is signing off , please call us back if needed     Electronically signed by DEMARCUS Medellin NP on 12/13/2022 at 10:49 AM  [facility] Adilene Mcwilliams

## 2022-12-13 NOTE — PROGRESS NOTES
Orthopedic Progress Note    Patient:  Syed Bermudez  YOB: 1960     60 y.o. female    Subjective:  Patient seen and examined. No complaints or concerns. No issue overnight. Pain controlled. Denies fever, HA, CP, SOB, N/V, dysuria. Flatus+, denies abdominal pain. Vitals reviewed, afebrile    Objective:   Vitals:    12/13/22 0050   BP:    Pulse:    Resp: 17   Temp:    SpO2:      Gen: NAD, cooperative    Cardiovascular: Regular rate  Respiratory: No acute respiratory distress  MSK:  LLE: Knee immobilizer maintained in the proper position on the left lower extremity. Lower leg exposed wrapped in Ace bandage clean dry and intact. Patient able to move exposed digits and plantar dorsiflex ankle. Sensation intact to exposed digits. The foot is warm and well perfused. Recent Labs     12/10/22  1744 12/11/22  0301 12/12/22  0815   WBC  --    < > 8.4   HGB  --    < > 9.6*   HCT  --    < > 30.4*   PLT  --    < > 196   INR 1.0  --   --    NA  --    < > 134*   K  --    < > 3.6*   BUN  --    < > 9   CREATININE  --    < > 0.53   GLUCOSE  --    < > 83    < > = values in this interval not displayed. Meds:   DVT ppx: Okay for DVT PPx POD1  Abx: Finish post operative abx (Clindamycin)  See rec for complete list    Impression 60 y.o. female who sustained a fall from multiple steps, being seen for:    Left quadriceps tendon rupture/patella avulsion s/p quad repair and patella ORIF, POD#1    Plan  -No plans for further orthopedic surgery intervention  - Antibiotics: Finish postoperative clindamycin  - Please maintain knee immobilizer. Do not remove.   - WB status: Weightbearing as tolerated to the left lower extremity with knee immobilizer on  - Ortho to change dressings POD#2, patient may be discharged prior if OK  - Pain control per primary team  - DVT ppx: Okay for DVT PPx POD1  - Ice/Elevate as needed for pain and swelling  - Encourage Incentive Spirometry use (IS 1600)  - PT/OT  - Dispo: Okay for discharge from an orthopedic surgery standpoint  - F/u in orthopedic surgery trauma clinic on 12/28  - Please page ortho with any questions      Jhony Calderon MD  Orthopedic Surgery Resident, PGY-1  [address] [address], [place]      PGY-2 Addendum    Patient seen and examined. Agree with subjective and objective portions from Dr. Roel Otoole with note adjusted where indicated. Ortho will change dressing POD#2 if patient is still admitted. Encourage mobilize with PT.       Juan R Julien DO PGY-2  Orthopedic Surgery Resident  [facility] , [facility]

## 2022-12-13 NOTE — DISCHARGE INSTR - COC
Continuity of Care Form    Patient Name: Sher Kirkland   :  1960  MRN:  [id number]    [facility] date:  12/10/2022  Discharge date:  2022    Code Status Order: Full Code   Advance Directives:     Admitting Physician:  Babs Blancas MD  PCP: Jerl Bernheim, DEMARCUS - CNP    Discharging Nurse: [address] Unit/Room#: 0102/0102-01  Discharging Unit Phone Number: [phone]    Emergency Contact:   Extended Emergency Contact Information  Primary Emergency Contact: Viviana Morrison  Address: [address] [place] [address], [facility]  [place] Phone: [phone]  Relation: Brother/Sister  Secondary Emergency Contact: Moustapha Marques  Address: [address] [place] [address], [facility]  Home Phone: [phone]  Relation: Brother/Sister    Past Surgical History:  Past Surgical History:   Procedure Laterality Date    ANKLE FRACTURE SURGERY Left 2022    QUAD TENDON REPAIR WITH TENDON AVULSION, ARTHREX, C-ARM performed by Leeanna Aguayo DO at [address] Left 2022    QUAD TENDON REPAIR WITH TENDON AVULSION       Immunization History:   Immunization History   Administered Date(s) Administered    COVID-19, MODERNA BLUE border, Primary or Immunocompromised, (age 12y+), IM, 100 mcg/0.5mL 2021, 2021, 2021    Influenza, FLUCELVAX, (age 10 mo+), MDCK, PF, 0.5mL 2021, 10/20/2022    Pneumococcal Conjugate 13-valent ([place]a Climes) 2016    Tetanus 2003       Active Problems:  Patient Active Problem List   Diagnosis Code    Syncope R55    Osteoarthritis M19.90    History of CVA (cerebrovascular accident) Z86.73    Essential hypertension I10    Vertigo R42    Chronic allergic rhinitis J30.9    Morbid obesity with BMI of 50.0-59.9, adult ([facility] [address] E66.01, Z68.43    Mixed hyperlipidemia E78.2    Avulsion fracture T14. 8XXA    Closed displaced fracture of left patella S82.002A       Isolation/Infection:   Isolation            No Isolation          Patient Infection Status       None to display            Nurse Assessment:  Last Vital Signs: BP (!) 143/94   Pulse 95   Temp 97.3 °F (36.3 °C) (Temporal)   Resp 19   Ht 5' 2\" (1.575 m)   Wt 293 lb 14 oz (133.3 kg)   SpO2 96%   BMI 53.75 kg/m²     Last documented pain score (0-10 scale): Pain Level: 2  Last Weight:   Wt Readings from Last 1 Encounters:   12/12/22 293 lb 14 oz (133.3 kg)     Mental Status:  oriented, alert, coherent, logical, thought processes intact, and able to concentrate and follow conversation    IV Access:  - None    Nursing Mobility/ADLs:  Walking   Assisted  Transfer  Assisted  Bathing  Assisted  Dressing  Assisted  Toileting  Assisted  Feeding  Independent  Med [address]  Med Delivery   whole    Wound Care Documentation and Therapy:  Incision 12/12/22 Thigh Anterior; Left (Active)   Dressing Status Clean;Dry; Intact 12/13/22 2006   Incision Cleansed Not Cleansed 12/13/22 1524   Dressing/Treatment Ace wrap 12/13/22 2006   Closure Other (Comment) 12/13/22 2006   Drainage Amount Other (Comment) 12/13/22 1524   Odor None 12/13/22 1524   Number of days: 1        Elimination:  Continence: Bowel: Yes  Bladder: Yes  Urinary Catheter: None   Colostomy/Ileostomy/Ileal Conduit: No       Date of Last BM: 12/10/2022    Intake/Output Summary (Last 24 hours) at 12/14/2022 0916  Last data filed at 12/14/2022 0530  Gross per 24 hour   Intake 900 ml   Output 1200 ml   Net -300 ml     I/O last 3 completed shifts: In: 2650.1 [P.O.:1740; I.V.:810.1; IV Piggyback:100]  Out: 7842 [Urine:1550]    Safety Concerns:      At Risk for Falls    Impairments/Disabilities:      None    Nutrition Therapy:  Current Nutrition Therapy:   - Oral Diet:  General    Routes of Feeding: Oral  Liquids: No Restrictions  Daily Fluid Restriction: no  Last Modified Barium Swallow with Video (Video Swallowing Test): not done    Treatments at the Time of Hospital Discharge:   Respiratory Treatments: N/A  Oxygen Therapy:  is not on home oxygen therapy. Ventilator:    - No ventilator support    Rehab Therapies: Physical Therapy and Occupational Therapy  Weight Bearing Status/Restrictions: No weight bearing restrictions- Weight bearing as tolerated on LLE  Other Medical Equipment (for information only, NOT a DME order):  cane and walker  Other Treatments: N/A    Patient's personal belongings (please select all that are sent with patient):  Alejo    RN SIGNATURE:  Electronically signed by Miguelito Moore on 12/14/22 at 4:30 PM EST    CASE MANAGEMENT/SOCIAL WORK SECTION    Inpatient Status Date: 12/10/22    Readmission Risk Assessment Score:  Readmission Risk              Risk of Unplanned Readmission:  7           Discharging to Facility/ Agency   Name: [facility]  Address:  Phone:  Fax:    Dialysis Facility (if applicable)   Name:  Address:  Dialysis Schedule:  Phone:  Fax:    / signature: Electronically signed by Wilma Corley RN on 12/14/22 at 9:16 AM EST    PHYSICIAN SECTION    Prognosis: Good    Condition at Discharge: Stable    Rehab Potential (if transferring to Rehab): Good    Recommended Labs or Other Treatments After Discharge: ***    Physician Certification: I certify the above information and transfer of Bina Birch  is necessary for the continuing treatment of the diagnosis listed and that she requires Acute Rehab for less 30 days.      Update Admission H&P: No change in H&P    PHYSICIAN SIGNATURE:  Electronically signed by DEMARCUS Giraldo CNP on 12/13/22 at 1:58 PM EST

## 2022-12-13 NOTE — PROGRESS NOTES
Occupational Therapy  Facility/Department: [address] NEURO  Occupational Therapy Initial Assessment    Name: Olga Lidia Hernandez  : 1960  MRN: [id number]  Date of Service: 2022    Chief Complaint   Patient presents with    Fall    Knee Pain       Discharge Recommendations:  Patient would benefit from continued therapy after discharge  OT Equipment Recommendations  Equipment Needed: Yes  Mobility Devices: Xena Fragmin; ADL Assistive Devices  Walker: Rolling (bariatric)  ADL Assistive Devices: Toileting - Heavy Duty Commode;Transfer Tub Bench;Reacher;Long-handled Shoe Horn;Long-handled Sponge;Dressing Stick       Patient Diagnosis(es): The encounter diagnosis was Closed displaced fracture of left patella, unspecified fracture morphology, initial encounter. Past Medical History:  has a past medical history of CVA (cerebral vascular accident) ([facility] [address] Hypertension, Osteoarthritis, and Syncope. Past Surgical History:  has a past surgical history that includes Tendon repair (Left, 2022) and Ankle fracture surgery (Left, 2022). Assessment   Performance deficits / Impairments: Decreased functional mobility ; Decreased ADL status; Decreased endurance;Decreased balance;Decreased high-level IADLs  Assessment: Pt agreeable to OT eval this date. Pt requires Mod-Max A X2 for all bed mobility, functional transfers, and functional mobility at this time d/t general deconditioning and decreased functional use of LLE. Pt currently requires Max A to complete LB ADLs and toileting tasks. Pt will require continued OT services to address deficits listed in order to improve functional independence.   Prognosis: Good  Decision Making: Medium Complexity  REQUIRES OT FOLLOW-UP: Yes  Activity Tolerance  Activity Tolerance: Patient Tolerated treatment well        Plan   Occupational Therapy Plan  Times Per Week: 3-5 x/wk  Current Treatment Recommendations: Balance training, Functional mobility training, Endurance training, Pain management, Safety education & training, Patient/Caregiver education & training, Equipment evaluation, education, & procurement, Self-Care / ADL, Home management training, Positioning     Restrictions  Restrictions/Precautions  Restrictions/Precautions: Weight Bearing, Fall Risk  Required Braces or Orthoses?: Yes  Lower Extremity Weight Bearing Restrictions  Left Lower Extremity Weight Bearing: Weight Bearing As Tolerated  Required Braces or Orthoses  Left Lower Extremity Brace: Knee Immobilizer (do not remove)  Position Activity Restriction  Other position/activity restrictions: patellar avulsion fx, s/p quad repair and patellar ORIF 12/12/22    Subjective   General  Patient assessed for rehabilitation services?: Yes  Family / Caregiver Present: Yes (2 sisters entered near end of session)  General Comment  Comments: RN ok'd pt for OT eval this date. Pt agreeable to session and pleasant/cooperative throughout.  Pt denies pain    Social/Functional History  Social/Functional History  Lives With:  (will go stay with sister, described as below)  Type of Home: House  Home Layout: Able to Live on Main level with bedroom/bathroom  Home Access: Ramped entrance  Bathroom Shower/Tub: Tub/Shower unit  Bathroom Toilet: Standard  Bathroom Equipment:  (none)  Bathroom Accessibility: Accessible  Home Equipment: Cane, Rollator  Has the patient had two or more falls in the past year or any fall with injury in the past year?: No (except reason for admission)  Receives Help From: Family (sisters, nieces, nephews.)  ADL Assistance: Independent  Homemaking Assistance: Independent  Homemaking Responsibilities: Yes  Ambulation Assistance: Independent (cane use or furniture walking)  Transfer Assistance: Independent  Active : No  Patient's  Info: sisters,  Occupation: Retired  Leisure & Hobbies: read, cooking, home shows,    Objective   Safety Devices  Type of Devices: Call light within reach;Nurse notified;Gait belt;Left in chair  Restraints  Restraints Initially in Place: No    Bed Mobility Training  Bed Mobility Training: Yes  Overall Level of Assistance: Moderate assistance;Assist X2;Additional time; Adaptive equipment (pt engaged in bed mobility requiring Mod a X2 for trunk and BLE progression with HOB elevated and significant reliance on bed rails)  Interventions: Safety awareness training; Tactile cues; Verbal cues  Supine to Sit: Moderate assistance;Assist X2;Additional time; Adaptive equipment  Sit to Supine:  (pt retired to recliner)  Balance  Sitting: With support (constant CGA required d/t slight posterior lean)  Standing: Impaired (pt requires Mod A grossly to maintain standing balance with use of RW for support. Posterior lean noted)  Transfer Training  Transfer Training: Yes  Overall Level of Assistance: Moderate assistance;Maximum assistance;Assist X2;Additional time; Adaptive equipment (pt engaged in 2 functional transfers from EOB initially with Max A X2 and RW use and on second attempt pt improved to Mod A X2 with B feet blocked and assistance to obtain full stand)  Interventions: Safety awareness training; Tactile cues; Verbal cues  Sit to Stand: Moderate assistance;Maximum assistance; Adaptive equipment; Additional time;Assist X2  Stand to Sit: Moderate assistance;Maximum assistance; Adaptive equipment; Additional time;Assist X2  Gait  Overall Level of Assistance: Moderate assistance;Assist X2;Additional time; Adaptive equipment (pt engaged in short functional mobility task from EOB > recliner with Mod a x2 and RW use. Pt requires physical assistance to weight shift and progress BLE and RW)  Interventions: Safety awareness training; Tactile cues; Verbal cues    AROM: Within functional limits  Strength:  Within functional limits (grossly 4+/5)  Coordination: Within functional limits  Tone: Normal  Sensation: Intact    ADL  Feeding: Modified independent ;Setup  Grooming: Modified independent ;Setup  UE Bathing: Minimal assistance;Setup; Increased time to complete  LE Bathing: Maximum assistance;Setup; Increased time to complete  UE Dressing: Minimal assistance;Setup; Increased time to complete (pt donned gown to back with Min A to thread BUE)  LE Dressing: Maximum assistance;Setup; Increased time to complete  LE Dressing Skilled Clinical Factors: pt requires Max A to reach foot level to don B socks; significant limitations in functional reach  Toileting: Maximum assistance;Setup; Increased time to complete       Vision  Vision: Within Functional Limits  Hearing  Hearing: Within functional limits  Cognition  Overall Cognitive Status: [facility]    Education Provided Comments: Pt educated on OT role, OT POC, bed mobility training, transfer training, DME use, safety awareness, balance maintenance, WBAT status, adaptive ADL tech, and importance of continued OT.  Pt verbalized good understanding    LUE AROM (degrees)  LUE AROM : WFL  Left Hand AROM (degrees)  Left Hand AROM: WFL  RUE AROM (degrees)  RUE AROM : WFL  Right Hand AROM (degrees)  Right Hand AROM: WFL    Hand Dominance  Hand Dominance: Right    AM-PAC Score  AM-[facility] Inpatient Daily Activity Raw Score: 17 (12/13/22 1715)  AM-PAC Inpatient ADL T-Scale Score : 37.26 (12/13/22 1715)  ADL Inpatient CMS 0-100% Score: 50.11 (12/13/22 1715)  ADL Inpatient CMS G-Code Modifier : CK (12/13/22 1715)    Goals  Short Term Goals  Time Frame for Short Term Goals: By discharge, pt will:  Short Term Goal 1: Demo Mod A for functional transfers and functional mobility with use of LRD for engagement in ADLs/IADLs  Short Term Goal 2: Demo Mod I for UB ADLs with AE used PRN  Short Term Goal 3: Demo Mod A for LB ADLs and toileting tasks with AE use PRN  Short Term Goal 4: Demo 6 min dynamic standing activity with Mod A and LRD for improved ADL/IADL independence  Short Term Goal 5: Demo Min A for bed mobility to increase independence with ADLs and to decrease risk for pressure injury       Therapy Time Individual Concurrent Group Co-treatment   Time In 1426         Time Out 1518         Minutes 52         Timed Code Treatment Minutes: 23 Minutes       Teagan Hi, OTR/L

## 2022-12-13 NOTE — PLAN OF CARE

## 2022-12-13 NOTE — PROGRESS NOTES
PROGRESS NOTE          PATIENT NAME: [address] [place] [address] RECORD NO. [id number]  DATE: 2022  SURGEON: Pete Del Toro  PRIMARY CARE PHYSICIAN: Earl Louie, DEMARCUS - CNP    HD: # 3    ASSESSMENT    Patient Active Problem List   Diagnosis    Syncope    Osteoarthritis    History of CVA (cerebrovascular accident)    Essential hypertension    Vertigo    Chronic allergic rhinitis    Morbid obesity with BMI of 50.0-59.9, adult ([address]    Mixed hyperlipidemia    Avulsion fracture    Closed displaced fracture of left patella       MEDICAL DECISION MAKING AND PLAN    77-year-old female with PMH including history of stroke \"in 2010\" with no residual deficits and HTN who originally presented to the ED on 12/10/2022 after a mechanical fall where her purse became wrapped around her walker causing her to fall down 4-5 stairs inside her house. She suffered a left 2.4 cm superiorly displaced avulsion fracture at the upper pole of the patella due to the fall. Left quadriceps tendon rupture/patella avulsion s/p quadriceps repair and patella ORIF  - Postop day 1  - Postop clindamycin  - WBAT LLE with knee immobilizer. Please maintain knee immobilizer and do not remove  - PT/OT for dispo planning  - pain control  - DVT ppx: Lovenox  - Dispo: No further orthopedic surgery intervention. Ortho to change dressings on postop day 2, but okay to discharge from an Ortho standpoint prior. Follow-up orthopedic/trauma clinic on 2022    Chief Complaint: \"doing fine\"    SUBJECTIVE    Patient seen resting comfortably in bed reading. She tells me that her pain is improved. She has a brace on her left lower extremity which is wrapped which is not to be removed. She has no complaints at this time.   Working with PT/OT     OBJECTIVE  VITALS: Temp: Temp: 98.2 °F (36.8 °C)Temp  Av.2 °F (36.8 °C)  Min: 97.4 °F (36.3 °C)  Max: 99.5 °F (16.2 °C) BP Systolic (27OFE), VRW:765 , Min:99 , CRV:438   Diastolic (39QPJ), SWK:79, Min:60, Max:95   Pulse Pulse  Av.3  Min: 71  Max: 93 Resp Resp  Av.4  Min: 13  Max: 19 Pulse ox SpO2  Av %  Min: 90 %  Max: 100 %  Constitutional:       General: She is not in acute distress. Appearance: She is obese. She is not ill-appearing. HENT:      Head: Normocephalic and atraumatic. Right Ear: External ear normal.      Left Ear: External ear normal.      Nose: Nose normal. No congestion. Mouth/Throat:      Mouth: Mucous membranes are moist.      Pharynx: Oropharynx is clear. Eyes:      General: No scleral icterus. Extraocular Movements: Extraocular movements intact. Pupils: Pupils are equal, round, and reactive to light. Cardiovascular:      Rate and Rhythm: Normal rate and regular rhythm. Pulses: Normal pulses. Heart sounds: Normal heart sounds. Pulmonary:      Effort: Pulmonary effort is normal. No respiratory distress. Breath sounds: Normal breath sounds. Abdominal:      General: There is no distension. Palpations: Abdomen is soft. Tenderness: There is no abdominal tenderness. There is no guarding. Musculoskeletal:         General: Tenderness and signs of injury present. No deformity. Cervical back: Neck supple. Comments: Left LE tenderness to palpation. LLE wrapped and in brace. 3+ edema bilateral lower extremities  Skin:     General: Skin is warm and dry. Neurological:      Mental Status: She is oriented to person, place, and time. Mental status is at baseline. Sensory: No sensory deficit. Motor: No weakness. Psychiatric:         Mood and Affect: Mood normal.         Behavior: Behavior normal.         Thought Content: Thought content normal.         Judgment: Judgment normal.        I/O last 3 completed shifts: In: 5301.5 [P.O.:1560; I.V.:3591.5; IV Piggyback:150]  Out: 8579 [Urine:1450; Blood:20]    Drain/tube output:  In: 2800.1 [P.O.:840;  I.V.:1810.1]  Out: 870 [Urine:850]    LAB:  CBC:   Recent Labs 12/11/22  0301 12/12/22  0815 12/13/22  0558   WBC 6.6 8.4 13.4*   HGB 9.8* 9.6* 9.2*   HCT 30.9* 30.4* 27.9*   MCV 86.1 87.1 85.8    196 220     BMP:   Recent Labs     12/11/22  0301 12/12/22  0815 12/13/22  0432    134* 138   K 3.7 3.6* 4.5    102 103   CO2 25 24 24   BUN 13 9 13   CREATININE 0.63 0.53 0.61   GLUCOSE 96 83 120*     COAGS:   Recent Labs     12/10/22  1744   APTT 23.6   INR 1.0       RADIOLOGY:  XR FEMUR LEFT (MIN 2 VIEWS)    Result Date: 12/10/2022  Left shoulder: 1. Mild degenerative changes as above. 2. No acute fracture or dislocation. Left femur: 1. Diffuse osteopenia. No acute osseous abnormality. 2. Tricompartmental osteoarthrosis of the left knee. Left knee: 1. Suspected age-indeterminate fracture at the upper pole of the patella. 2. Tricompartmental osteoarthrosis. Severe degenerative changes of the medial compartment. Left tib fib: 1. Suspected superiorly displaced avulsion fractures at the upper pole of the patella superiorly displaced by 2.4 cm. Small suprapatellar joint effusion. 2. Moderate degenerative changes as detailed above. 3. Tibia and fibula appear intact. 4. Mild-to-moderate soft tissue edema of the left leg and ankle. XR KNEE LEFT (1-2 VIEWS)    Result Date: 12/10/2022  Age-indeterminate fracture and/or bony resorption of the upper pole of the patella. Small joint effusion. Moderate edema. Underlying infection/osteomyelitis not excluded on the basis of this study in the appropriate clinical setting. XR KNEE LEFT (3 VIEWS)    Result Date: 12/10/2022  Left shoulder: 1. Mild degenerative changes as above. 2. No acute fracture or dislocation. Left femur: 1. Diffuse osteopenia. No acute osseous abnormality. 2. Tricompartmental osteoarthrosis of the left knee. Left knee: 1. Suspected age-indeterminate fracture at the upper pole of the patella. 2. Tricompartmental osteoarthrosis. Severe degenerative changes of the medial compartment.  Left tib fib: 1. Suspected superiorly displaced avulsion fractures at the upper pole of the patella superiorly displaced by 2.4 cm. Small suprapatellar joint effusion. 2. Moderate degenerative changes as detailed above. 3. Tibia and fibula appear intact. 4. Mild-to-moderate soft tissue edema of the left leg and ankle. XR TIBIA FIBULA LEFT (2 VIEWS)    Result Date: 12/10/2022  Left shoulder: 1. Mild degenerative changes as above. 2. No acute fracture or dislocation. Left femur: 1. Diffuse osteopenia. No acute osseous abnormality. 2. Tricompartmental osteoarthrosis of the left knee. Left knee: 1. Suspected age-indeterminate fracture at the upper pole of the patella. 2. Tricompartmental osteoarthrosis. Severe degenerative changes of the medial compartment. Left tib fib: 1. Suspected superiorly displaced avulsion fractures at the upper pole of the patella superiorly displaced by 2.4 cm. Small suprapatellar joint effusion. 2. Moderate degenerative changes as detailed above. 3. Tibia and fibula appear intact. 4. Mild-to-moderate soft tissue edema of the left leg and ankle. XR SHOULDER LEFT (MIN 2 VIEWS)    Result Date: 12/10/2022  Left shoulder: 1. Mild degenerative changes as above. 2. No acute fracture or dislocation. Left femur: 1. Diffuse osteopenia. No acute osseous abnormality. 2. Tricompartmental osteoarthrosis of the left knee. Left knee: 1. Suspected age-indeterminate fracture at the upper pole of the patella. 2. Tricompartmental osteoarthrosis. Severe degenerative changes of the medial compartment. Left tib fib: 1. Suspected superiorly displaced avulsion fractures at the upper pole of the patella superiorly displaced by 2.4 cm. Small suprapatellar joint effusion. 2. Moderate degenerative changes as detailed above. 3. Tibia and fibula appear intact. 4. Mild-to-moderate soft tissue edema of the left leg and ankle. XR CHEST PORTABLE    Result Date: 12/10/2022  No acute focal airspace consolidation. Ariel Cramer DO  12/11/22, 7:37 AM        Attending Note    Patient is post ORIF of patella. Needs PT/OT evaluation and discharge  I have reviewed the above TECSS note(s) and I either performed the key elements of the medical history and physical exam or was present with the resident when the key elements of the medical history and physical exam were performed. I have discussed the findings, established the care plan and recommendations with Residents, TECSS RN, bedside nurse.     Electronically signed by Edison Hawkins MD on 12/13/22 at 10:39 AM EST

## 2022-12-13 NOTE — PROGRESS NOTES
Speech Language Pathology  [facility]  Speech Language Pathology    SPEECH/COGNITIVE ASSESSMENT    NO LOC,[facility] OR CVA/TIA - ST TO DEFER AT THIS TIME      Date: 12/13/2022  Patient Name: Chari Fernando  YOB: 1960   AGE: 60 y.o. MRN: [id number]        PT NOT SEEN FOR SPEECH OR COGNITIVE ASSESSMENT AT THIS TIME AS NO LOC, [facility]I OR CVA/TIA IS DOCUMENTED. ST TO DEFER AT THIS TIME. PLEASE RE-COSULT AS NEEDED. Yobani Mccabe M.A.  CCC-SLP  12/13/2022  2:33 PM

## 2022-12-13 NOTE — PROGRESS NOTES
Physical Therapy  Facility/Department: [address] NEURO  Physical Therapy Initial Assessment    Name: Jessica Andres  : 1960  MRN: [id number]  Date of Service: 2022    Chief Complaint   Patient presents with    Fall    Knee Pain       Discharge Recommendations:    Further therapy recommended at discharge. PT Equipment Recommendations  Other: Pt requires bariatric RW to safetly attempt ambulation with skilled assistance      Patient Diagnosis(es): The encounter diagnosis was Closed displaced fracture of left patella, unspecified fracture morphology, initial encounter. Past Medical History:  has a past medical history of CVA (cerebral vascular accident) ([facility] ([place] [address] Hypertension, Osteoarthritis, and Syncope. Past Surgical History:  has a past surgical history that includes Tendon repair (Left, 2022) and Ankle fracture surgery (Left, 2022). Assessment   Body Structures, Functions, Activity Limitations Requiring Skilled Therapeutic Intervention: Decreased functional mobility ; Decreased strength;Decreased endurance;Decreased balance;Decreased safe awareness  Assessment: Pt modAx2 bed mobility, modAx2 to stand to bairatric RW, modA amb 1' bariatric RW. Pt would benefit from continued acute PT to address deficits.   Therapy Prognosis: Good  Decision Making: High Complexity  Requires PT Follow-Up: Yes  Activity Tolerance  Activity Tolerance: Patient tolerated treatment well;Patient limited by fatigue     Plan   Physcial Therapy Plan  General Plan:  (5-6x/wk)  Current Treatment Recommendations: Strengthening, Balance training, Gait training, Functional mobility training, Endurance training, Home exercise program, Safety education & training, Patient/Caregiver education & training, Equipment evaluation, education, & procurement, Therapeutic activities  Safety Devices  Type of Devices: Call light within reach, Nurse notified, Gait belt, Patient at risk for falls, All fall risk precautions in place, Left in chair  Restraints  Restraints Initially in Place: No     Restrictions  Restrictions/Precautions  Restrictions/Precautions: Weight Bearing, General Precautions, Fall Risk  Required Braces or Orthoses?: Yes  Lower Extremity Weight Bearing Restrictions  Left Lower Extremity Weight Bearing: Weight Bearing As Tolerated  Required Braces or Orthoses  Left Lower Extremity Brace: Knee Immobilizer (do not remove)  Position Activity Restriction  Other position/activity restrictions: patellar avulsion fx, s/p quad repair and patellar ORIF 12/12     Subjective   General  Chart Reviewed: Yes  Patient assessed for rehabilitation services?: Yes  Response To Previous Treatment: Not applicable  Family / Caregiver Present: No (but 2 sisters arrived mid session)  Follows Commands: Within Functional Limits  General Comment  Comments: RN and pt agreeable to PT. Pt alert in bed upon arrival. OT co-eval  Subjective  Subjective: Pt denies any pain or any numbness or tingling.          Social/Functional History  Social/Functional History  Lives With:  (will go stay with sister, described as below)  Type of Home: House  Home Layout: Able to Live on Main level with bedroom/bathroom  Home Access: Ramped entrance  Bathroom Shower/Tub: Tub/Shower unit  Bathroom Toilet: Standard  Bathroom Equipment:  (none)  Bathroom Accessibility: Accessible  Home Equipment: Cane, Rollator  Has the patient had two or more falls in the past year or any fall with injury in the past year?: No (except reason for admission)  Receives Help From: Family (sisters, nieces, nephews.)  ADL Assistance: Independent  Homemaking Assistance: Independent  Homemaking Responsibilities: Yes  Ambulation Assistance: Independent (cane use or furniture walking)  Transfer Assistance: Independent  Active : No  Patient's  Info: sisters,  Occupation: Retired  Leisure & Hobbies: read, cooking, home shows,  Additional Comments: R handed  [address]: Within Functional Limits  Hearing  Hearing: Within functional limits    Cognition   Orientation  Overall Orientation Status: Within Functional Limits  Cognition  Overall Cognitive Status: WFL     Objective     AROM RLE (degrees)  RLE AROM: WFL  AROM LLE (degrees)  LLE General AROM: knee immobalizer  AROM RUE (degrees)  RUE AROM : WFL  AROM LUE (degrees)  LUE AROM : WFL  Strength RLE  Strength RLE: WFL  Comment: 4/5 grossly  Strength LLE  Comment: minimal unweighting with SLR, able to progress in standing, DF/PF wfl  Strength RUE  Strength RUE: WFL  Comment: see OT  Strength LUE  Strength LUE: WFL  Comment: see OT           Bed mobility  Supine to Sit: Moderate assistance;2 Person assistance (LLE and RUE HHA.)  Sit to Supine:  (left in chair.)  Scooting: Minimal assistance  Transfers  Sit to Stand: Moderate Assistance;2 Person Assistance;Maximum Assistance (maxAx2 for first sit to stand, modAx2 for second)  Stand to Sit: Moderate Assistance (poorly controlled descent)  Ambulation  Surface: Level tile  Device: Rolling Walker (bariatric)  Assistance: Moderate assistance  Quality of Gait: very slow, cueing throughout, progressing LLE anterior, then RLE retro to chair, assist with RW navigation  Distance: 1'  More Ambulation?: No  Stairs/Curb  Stairs?: No     Balance  Posture: Fair  Sitting - Static: Fair;+  Sitting - Dynamic: Fair  Standing - Static: Fair;-  Standing - Dynamic: Poor;+  Comments: Bariatric RW used while assessing standing balance  Exercise Treatment: increased time for mobility, EOB 20min grossly CGA. multiple attempts to stand first time. standard RW used initial attempt, bariatric RW used 2nd attempt.         AM-PAC Score  AM-PAC Inpatient Mobility Raw Score : 10 (12/13/22 1554)  AM-PAC Inpatient T-Scale Score : 32.29 (12/13/22 1554)  Mobility Inpatient CMS 0-100% Score: 76.75 (12/13/22 1554)  Mobility Inpatient CMS G-Code Modifier : CL (12/13/22 1554)     Goals  Short Term Goals  Time Frame for Short Term Goals: 14 visits  Short Term Goal 1: Pt will be Preet bed mobility  Short Term Goal 2: Pt will be Preet transfers  Short Term Goal 3: Pt will be Preet amb 48' RW       Education  Patient Education  Education Given To: Patient; Family  Education Provided: Role of Therapy;Plan of Care  Education Method: Demonstration;Verbal  Barriers to Learning: None  Education Outcome: Verbalized understanding;Demonstrated understanding      Therapy Time   Individual Concurrent Group Co-treatment   Time In 1433         Time Out 1519         Minutes 46         Timed Code Treatment Minutes: 21 Minutes       Vijaya Reyes PT

## 2022-12-13 NOTE — PROGRESS NOTES
POST OP NOTE    SUBJECTIVE  Pt s/p open treatment of left patellar fracture. Patient states her pain is controlled post op and denies any paresthesias, weakness, chest pain, SOB. OBJECTIVE  VITALS:  /61   Pulse 86   Temp 97.9 °F (36.6 °C) (Oral)   Resp 17   Ht 5' 2\" (1.575 m)   Wt 293 lb 14 oz (133.3 kg)   SpO2 99%   BMI 53.75 kg/m²         GENERAL:  Awake and alert. No acute distress  CARDIOVASCULAR:  Regular Rate and Rhythm   LUNGS:  No increased respiratory effort  ABDOMEN:   Abdomen soft, non-tender, non-distended  INCISION: LLE dressed and wrapped per orthopedics, clean and dry, moves all toes and sensation intact distally     ASSESSMENT  1. POD# 0 s/p open treatment of left patellar fracture    PLAN  1. Pain management- MMPT  2.  DVT proph- POD #1  F/u AM labs tomorrow     Dispo:  PT/OT         Lizbeth Benito DO  Trauma/Surgery Service  12/12/2022 at 9:46 PM

## 2022-12-13 NOTE — CARE COORDINATION
Trauma Care Coordination:     Awaiting PT/OT evals for discharge planning. Patient would like [address], plans to stay at her sister's house and will need DME equipment.

## 2022-12-13 NOTE — CARE COORDINATION
SBIRT  Met with pt this date was very pleasant and cooperative  Pt denies any drug or alcohol use. Pt has no suicidal ideations/depression  Screenings were negative. Alcohol Screening and Brief Intervention        No results for input(s): ALC in the last 72 hours. Alcohol Pre-screening     (WOMEN ONLY) How many times in the past year have you had 4 or more drinks in a day?: None    Alcohol Screening Audit       Drug Pre-Screening   How many times in the past year have you used a recreational drug or used a prescription medication for nonmedical reasons?: None    Drug Screening DAST       Mood Pre-Screening (PHQ-2)  During the past two weeks, have you been bothered by little interest or pleasure in doing things?: No  During the past two weeks, have you been bothered by feeling down, depressed, or hopeless?: No    Mood Pre-Screening (PHQ-9)         I have interviewed Delilah Carl, [phone] regarding  Her alcohol consumption/drug use and risk for excessive use. Screenings were negative. Patient  N/A intervention at this time.      Deferred []    Completed on: 12/13/2022   [address]

## 2022-12-14 VITALS
OXYGEN SATURATION: 90 % | SYSTOLIC BLOOD PRESSURE: 127 MMHG | HEART RATE: 88 BPM | HEIGHT: 62 IN | DIASTOLIC BLOOD PRESSURE: 62 MMHG | RESPIRATION RATE: 16 BRPM | WEIGHT: 293 LBS | TEMPERATURE: 98.2 F | BODY MASS INDEX: 53.92 KG/M2

## 2022-12-14 LAB
ANION GAP SERPL CALCULATED.3IONS-SCNC: 12 MMOL/L (ref 9–17)
BUN BLDV-MCNC: 13 MG/DL (ref 8–23)
CALCIUM SERPL-MCNC: 8.5 MG/DL (ref 8.6–10.4)
CHLORIDE BLD-SCNC: 100 MMOL/L (ref 98–107)
CO2: 24 MMOL/L (ref 20–31)
CREAT SERPL-MCNC: 0.55 MG/DL (ref 0.5–0.9)
EKG ATRIAL RATE: 111 BPM
EKG P AXIS: 53 DEGREES
EKG P-R INTERVAL: 156 MS
EKG Q-T INTERVAL: 340 MS
EKG QRS DURATION: 102 MS
EKG QTC CALCULATION (BAZETT): 462 MS
EKG R AXIS: -24 DEGREES
EKG T AXIS: 43 DEGREES
EKG VENTRICULAR RATE: 111 BPM
GFR SERPL CREATININE-BSD FRML MDRD: >60 ML/MIN/1.73M2
GLUCOSE BLD-MCNC: 87 MG/DL (ref 70–99)
HCT VFR BLD CALC: 27.8 % (ref 36.3–47.1)
HEMOGLOBIN: 9 G/DL (ref 11.9–15.1)
MCH RBC QN AUTO: 28 PG (ref 25.2–33.5)
MCHC RBC AUTO-ENTMCNC: 32.4 G/DL (ref 28.4–34.8)
MCV RBC AUTO: 86.3 FL (ref 82.6–102.9)
NRBC AUTOMATED: 0 PER 100 WBC
PDW BLD-RTO: 13.3 % (ref 11.8–14.4)
PLATELET # BLD: 200 K/UL (ref 138–453)
PMV BLD AUTO: 11.1 FL (ref 8.1–13.5)
POTASSIUM SERPL-SCNC: 3.6 MMOL/L (ref 3.7–5.3)
RBC # BLD: 3.22 M/UL (ref 3.95–5.11)
SODIUM BLD-SCNC: 136 MMOL/L (ref 135–144)
WBC # BLD: 10.7 K/UL (ref 3.5–11.3)

## 2022-12-14 PROCEDURE — 6370000000 HC RX 637 (ALT 250 FOR IP): Performed by: STUDENT IN AN ORGANIZED HEALTH CARE EDUCATION/TRAINING PROGRAM

## 2022-12-14 PROCEDURE — 85027 COMPLETE CBC AUTOMATED: CPT

## 2022-12-14 PROCEDURE — 99222 1ST HOSP IP/OBS MODERATE 55: CPT | Performed by: PHYSICAL MEDICINE & REHABILITATION

## 2022-12-14 PROCEDURE — 97530 THERAPEUTIC ACTIVITIES: CPT

## 2022-12-14 PROCEDURE — 6360000002 HC RX W HCPCS: Performed by: STUDENT IN AN ORGANIZED HEALTH CARE EDUCATION/TRAINING PROGRAM

## 2022-12-14 PROCEDURE — 80048 BASIC METABOLIC PNL TOTAL CA: CPT

## 2022-12-14 PROCEDURE — 36415 COLL VENOUS BLD VENIPUNCTURE: CPT

## 2022-12-14 PROCEDURE — 6370000000 HC RX 637 (ALT 250 FOR IP): Performed by: NURSE PRACTITIONER

## 2022-12-14 RX ORDER — IBUPROFEN 600 MG/1
600 TABLET ORAL EVERY 6 HOURS PRN
Status: DISCONTINUED | OUTPATIENT
Start: 2022-12-14 | End: 2022-12-14 | Stop reason: HOSPADM

## 2022-12-14 RX ADMIN — METHOCARBAMOL TABLETS 750 MG: 750 TABLET, COATED ORAL at 08:52

## 2022-12-14 RX ADMIN — CARVEDILOL 6.25 MG: 6.25 TABLET, FILM COATED ORAL at 08:53

## 2022-12-14 RX ADMIN — HYDRALAZINE HYDROCHLORIDE 25 MG: 25 TABLET, FILM COATED ORAL at 08:52

## 2022-12-14 RX ADMIN — ACETAMINOPHEN 1000 MG: 500 TABLET ORAL at 14:10

## 2022-12-14 RX ADMIN — ENOXAPARIN SODIUM 30 MG: 100 INJECTION SUBCUTANEOUS at 08:54

## 2022-12-14 RX ADMIN — AMLODIPINE BESYLATE 10 MG: 10 TABLET ORAL at 08:53

## 2022-12-14 RX ADMIN — METHOCARBAMOL TABLETS 750 MG: 750 TABLET, COATED ORAL at 14:10

## 2022-12-14 RX ADMIN — ATORVASTATIN CALCIUM 20 MG: 20 TABLET, FILM COATED ORAL at 08:53

## 2022-12-14 RX ADMIN — GABAPENTIN 300 MG: 300 CAPSULE ORAL at 08:53

## 2022-12-14 RX ADMIN — ACETAMINOPHEN 1000 MG: 500 TABLET ORAL at 06:24

## 2022-12-14 RX ADMIN — GABAPENTIN 300 MG: 300 CAPSULE ORAL at 14:09

## 2022-12-14 RX ADMIN — POLYETHYLENE GLYCOL 3350 17 G: 17 POWDER, FOR SOLUTION ORAL at 08:52

## 2022-12-14 ASSESSMENT — PAIN DESCRIPTION - DESCRIPTORS: DESCRIPTORS: SORE

## 2022-12-14 ASSESSMENT — PAIN DESCRIPTION - ONSET: ONSET: GRADUAL

## 2022-12-14 ASSESSMENT — PAIN - FUNCTIONAL ASSESSMENT: PAIN_FUNCTIONAL_ASSESSMENT: PREVENTS OR INTERFERES SOME ACTIVE ACTIVITIES AND ADLS

## 2022-12-14 ASSESSMENT — PAIN DESCRIPTION - PAIN TYPE: TYPE: ACUTE PAIN;SURGICAL PAIN

## 2022-12-14 ASSESSMENT — PAIN DESCRIPTION - FREQUENCY: FREQUENCY: INTERMITTENT

## 2022-12-14 ASSESSMENT — PAIN DESCRIPTION - ORIENTATION: ORIENTATION: LEFT

## 2022-12-14 ASSESSMENT — PAIN SCALES - GENERAL: PAINLEVEL_OUTOF10: 2

## 2022-12-14 ASSESSMENT — PAIN DESCRIPTION - LOCATION: LOCATION: LEG;KNEE

## 2022-12-14 NOTE — CARE COORDINATION
Poppy Thurston was evaluated today and a DME order was entered for a bariatric wheeled walker because she requires this to successfully complete daily living tasks of ambulating. A bariatric wheeled walker is necessary due to the patient's unsteady gait, upper body weakness, and inability to  an ambulation device; and she can ambulate only by pushing a walker instead of a lesser assistive device such as a cane, crutch, or standard walker. The need for this equipment was discussed with the patient and she understands and is in agreement. Poppy Thurston requires a heavy duty bedside commode due to being confined to one level of the home, and is physically incapable of utilizing regular toilet facilities. Current body weight is Weight: 293 lb 14 oz (133.3 kg). Poppy Thurston was evaluated today and a DME order was entered for a transfer tub bench because the patient requires this to successfully complete daily living tasks of bathing, grooming and hygiene. A transfer tub bench is necessary due to the patient's unsteady gait, inability to stand unassisted in the shower/bath. The need for this equipment was discussed with the patient. They understand and are in agreement. Poppy Thurston will require the following home care treatments or therapies: RN/PT/OT. Home care will be necessary because of the patient requires the use of a walker. The patient is in agreement to receiving home care.

## 2022-12-14 NOTE — CONSULTS
Physical Medicine & Rehabilitation  Consult Note      Admitting Physician: Sandra Burgess MD    Primary Care Provider: DEMARCUS Mckeon CNP     Reason for Consult:  Acute Inpatient Rehabilitation    Chief Complaint: Fall down 4-5 stairs in her home    History of Present Illness:  Referring Provider is requesting an evaluation for appropriate placement upon discharge from acute care. Ms. Gini Burden is a 60 y.o.  female who was admitted to [place] [address] on 12/10/2022 with Fall and Knee Pain    50-year-old female with history of hypertension and CVA status post fall uses a cane at baseline for ambulation walking down her stairs backwards with her cane as she always does. She admits position and fell onto her left knee had difficulty ambulating. She also had some left shoulder pain she did not hit her head. She does note a history of osteoarthritis of left knee. Orthopedics-left quadricep tendon rupture/patella avulsion status post quadriceps repair and patella ORIF on 12/12-weightbearing as tolerated with the right leg, maintain knee immobilizer do not remove,    Trauma-as above t, noted  patient wishes to go home with home health    Radiology:  XR FEMUR LEFT (MIN 2 VIEWS)    Result Date: 12/10/2022  Left shoulder: 1. Mild degenerative changes as above. 2. No acute fracture or dislocation. Left femur: 1. Diffuse osteopenia. No acute osseous abnormality. 2. Tricompartmental osteoarthrosis of the left knee. Left knee: 1. Suspected age-indeterminate fracture at the upper pole of the patella. 2. Tricompartmental osteoarthrosis. Severe degenerative changes of the medial compartment. Left tib fib: 1. Suspected superiorly displaced avulsion fractures at the upper pole of the patella superiorly displaced by 2.4 cm. Small suprapatellar joint effusion. 2. Moderate degenerative changes as detailed above. 3. Tibia and fibula appear intact.  4. Mild-to-moderate soft tissue edema of the left leg and ankle. XR KNEE LEFT (1-2 VIEWS)    Result Date: 12/10/2022  Age-indeterminate fracture and/or bony resorption of the upper pole of the patella. Small joint effusion. Moderate edema. Underlying infection/osteomyelitis not excluded on the basis of this study in the appropriate clinical setting. XR KNEE LEFT (3 VIEWS)    Result Date: 12/10/2022  Left shoulder: 1. Mild degenerative changes as above. 2. No acute fracture or dislocation. Left femur: 1. Diffuse osteopenia. No acute osseous abnormality. 2. Tricompartmental osteoarthrosis of the left knee. Left knee: 1. Suspected age-indeterminate fracture at the upper pole of the patella. 2. Tricompartmental osteoarthrosis. Severe degenerative changes of the medial compartment. Left tib fib: 1. Suspected superiorly displaced avulsion fractures at the upper pole of the patella superiorly displaced by 2.4 cm. Small suprapatellar joint effusion. 2. Moderate degenerative changes as detailed above. 3. Tibia and fibula appear intact. 4. Mild-to-moderate soft tissue edema of the left leg and ankle. XR TIBIA FIBULA LEFT (2 VIEWS)    Result Date: 12/10/2022  Left shoulder: 1. Mild degenerative changes as above. 2. No acute fracture or dislocation. Left femur: 1. Diffuse osteopenia. No acute osseous abnormality. 2. Tricompartmental osteoarthrosis of the left knee. Left knee: 1. Suspected age-indeterminate fracture at the upper pole of the patella. 2. Tricompartmental osteoarthrosis. Severe degenerative changes of the medial compartment. Left tib fib: 1. Suspected superiorly displaced avulsion fractures at the upper pole of the patella superiorly displaced by 2.4 cm. Small suprapatellar joint effusion. 2. Moderate degenerative changes as detailed above. 3. Tibia and fibula appear intact. 4. Mild-to-moderate soft tissue edema of the left leg and ankle. XR SHOULDER LEFT (MIN 2 VIEWS)    Result Date: 12/10/2022  Left shoulder: 1.  Mild degenerative changes as above. 2. No acute fracture or dislocation. Left femur: 1. Diffuse osteopenia. No acute osseous abnormality. 2. Tricompartmental osteoarthrosis of the left knee. Left knee: 1. Suspected age-indeterminate fracture at the upper pole of the patella. 2. Tricompartmental osteoarthrosis. Severe degenerative changes of the medial compartment. Left tib fib: 1. Suspected superiorly displaced avulsion fractures at the upper pole of the patella superiorly displaced by 2.4 cm. Small suprapatellar joint effusion. 2. Moderate degenerative changes as detailed above. 3. Tibia and fibula appear intact. 4. Mild-to-moderate soft tissue edema of the left leg and ankle. XR CHEST PORTABLE    Result Date: 12/10/2022  No acute focal airspace consolidation. Review of Systems:  Constitutional: negative for anorexia, chills, fatigue, fevers, sweats and weight loss  Eyes: negative for redness and visual disturbance  Ears, nose, mouth, throat, and face: negative for earaches, sore throat and tinnitus  Respiratory: negative for cough and shortness of breath  Cardiovascular: negative for chest pain, dyspnea and palpitations  Gastrointestinal: negative for abdominal pain, change in bowel habits, constipation, nausea and vomiting  Genitourinary:negative for dysuria, frequency, hesitancy and urinary incontinence  Integument/breast: negative for pruritus and rash  Musculoskeletal:negative for muscle weakness and stiff joints  Neurological: negative for dizziness, headaches and weakness  Behavioral/Psych: negative for decreased appetite, depression and fatigue    Functional History:  PTA: Independent with all activities.     Current:  PT:    Bed Mobility Training  Bed Mobility Training: Yes  Overall Level of Assistance: Maximum assistance, Assist X1, Additional time (for LLE only; pt able to get to the EOB using bed controls and bed rails (will have hospital bed at home))  Interventions: Verbal cues, Manual cues, Safety awareness training, Tactile cues, Visual cues, Weight shifting training/pressure relief  Rolling: Modified independent, Additional time (using bed controls (HOB elevated) and bed rails)  Supine to Sit: Maximum assistance, Assist X1, Additional time (pt needed assistance to move LLE only; able to use UEs and RLE to move from supine to sit w/o PT assistance)  Sit to Supine:  (pt retired to recliner)  Scooting: Maximum assistance, Assist X1 (for LLE support; frequent cues to instruct pt to scoot back in chair which she could do w/o my physical assistance except to support LLE)  Restrictions/Precautions  Restrictions/Precautions: Weight Bearing, Fall Risk  Required Braces or Orthoses?: Yes  Lower Extremity Weight Bearing Restrictions  Left Lower Extremity Weight Bearing: Weight Bearing As Tolerated  Position Activity Restriction  Other position/activity restrictions: patellar avulsion fx, s/p quad repair and patellar ORIF 12/12/22    Transfer Training  Transfer Training: Yes  Overall Level of Assistance: Maximum assistance, Assist X1 (from elevated bed; from chair, mod A+1 and  using ree stedy--pt had to rotate her hips to fit in the ree stedy d/t her size)  Interventions: Demonstration, Manual cues, Safety awareness training, Verbal cues, Visual cues, Weight shifting training/pressure relief  Sit to Stand: Maximum assistance, Assist X1, Assist X2 (max A+1 from elevated bed; max A+2 from low chair (pt uses lift chair at home))  Stand to Sit: Maximum assistance, Assist X1 (poor control descending into the seat; assist to support LLE)  Stand Pivot Transfers: Minimum assistance, Additional time (using rwalker)  Bed to Chair: Minimum assistance, Assist X1, Additional time  Toilet Transfer:  Total assistance, Assist X2 (ree stedy transfer to [facility])  Bed mobility  Supine to Sit: Moderate assistance, 2 Person assistance (LLE and RUE HHA.)  Sit to Supine:  (left in chair.)  Scooting: Minimal assistance    Gait Training  Gait Training: Yes  Gait  Overall Level of Assistance: Minimum assistance, Assist X1  Interventions: Safety awareness training, Tactile cues, Verbal cues, Weight shifting training/pressure relief  Base of Support: Widened  Speed/Ayse: Slow  Step Length: Left shortened, Right shortened  Gait Abnormalities:  (flexed trunk; able to correct with verbal cues)  Distance (ft): 3 Feet  Assistive Device: Brace/splint, Walker, rolling  Transfers  Sit to Stand: Moderate Assistance, 2 Person Assistance, Maximum Assistance (maxAx2 for first sit to stand, modAx2 for second)  Stand to Sit: Moderate Assistance (poorly controlled descent)     Bed mobility  Supine to Sit: Moderate assistance;2 Person assistance (LLE and RUE HHA.)  Sit to Supine:  (left in chair.)  Scooting: Minimal assistance  Transfers  Sit to Stand: Moderate Assistance;2 Person Assistance;Maximum Assistance (maxAx2 for first sit to stand, modAx2 for second)  Stand to Sit: Moderate Assistance (poorly controlled descent)  Ambulation  Surface: Level tile  Device: Rolling Walker (bariatric)  Assistance: Moderate assistance  Quality of Gait: very slow, cueing throughout, progressing LLE anterior, then RLE retro to chair, assist with RW navigation  Distance: 1'  More Ambulation?: No    OT:   ADL  Feeding: Modified independent , Setup  Grooming: Modified independent , Setup  UE Bathing: Minimal assistance, Setup, Increased time to complete  LE Bathing: Maximum assistance, Setup, Increased time to complete  UE Dressing: Minimal assistance, Setup, Increased time to complete  LE Dressing: Maximum assistance, Setup, Increased time to complete  LE Dressing Skilled Clinical Factors: pt requires Max A to reach foot level to don B socks; significant limitations in functional reach  Toileting: Maximum assistance, Setup, Increased time to complete    Toilet Transfer:  Total assistance, Assist X2 (ree eastman transfer to [facility])    ST:      Past Medical History:        Diagnosis Date    CVA (cerebral vascular accident) [facility])     Hypertension     Osteoarthritis     knee    Syncope        Past Surgical History:        Procedure Laterality Date    ANKLE FRACTURE SURGERY Left 12/12/2022    QUAD TENDON REPAIR WITH TENDON AVULSION, ARTHREX, C-ARM performed by Derek Ghosh DO at [address] Left 12/12/2022    QUAD TENDON REPAIR WITH TENDON AVULSION       Allergies:     Allergies   Allergen Reactions    Latex Swelling    Ace Inhibitors Swelling     Ace inhibitor caused lips swelling    Pcn [Penicillins] Other (See Comments)     Makes \"my throat close up\" , very tired & hives all over, in 2003    Dye [Iodides] Rash     Rash-\"pus bumps\" on thighs        Current Medications:   Current Facility-Administered Medications: ibuprofen (ADVIL;MOTRIN) tablet 600 mg, 600 mg, Oral, Q6H PRN  enoxaparin Sodium (LOVENOX) injection 30 mg, 30 mg, SubCUTAneous, BID  polyethylene glycol (GLYCOLAX) packet 17 g, 17 g, Oral, Daily  senna (SENOKOT) tablet 8.6 mg, 1 tablet, Oral, Nightly  carvedilol (COREG) tablet 6.25 mg, 6.25 mg, Oral, BID WC  amLODIPine (NORVASC) tablet 10 mg, 10 mg, Oral, Daily  atorvastatin (LIPITOR) tablet 20 mg, 20 mg, Oral, Daily  hydrALAZINE (APRESOLINE) tablet 25 mg, 25 mg, Oral, BID  sodium chloride flush 0.9 % injection 5-40 mL, 5-40 mL, IntraVENous, PRN  acetaminophen (TYLENOL) tablet 1,000 mg, 1,000 mg, Oral, 3 times per day  methocarbamol (ROBAXIN) tablet 750 mg, 750 mg, Oral, TID  gabapentin (NEURONTIN) capsule 300 mg, 300 mg, Oral, TID    Social History:  Social History     Socioeconomic History    Marital status: Single     Spouse name: Not on file    Number of children: Not on file    Years of education: Not on file    Highest education level: Not on file   Occupational History    Not on file   Tobacco Use    Smoking status: Never    Smokeless tobacco: Never   Vaping Use    Vaping Use: Never used   Substance and Sexual Activity    Alcohol use: No    Drug use: No    Sexual activity: Not on file   Other Topics Concern    Not on file   Social History Narrative    Not on file     Social Determinants of Health     Financial Resource Strain: Medium Risk    Difficulty of Paying Living Expenses: Somewhat hard   Food Insecurity: Food Insecurity Present    Worried About Running Out of Food in the Last Year: Sometimes true    Ran Out of Food in the Last Year: Never true   Transportation Needs: Not on file   Physical Activity: Insufficiently Active    Days of Exercise per Week: 7 days    Minutes of Exercise per Session: 20 min   Stress: Not on file   Social Connections: Not on file   Intimate Partner Violence: Not on file   Housing Stability: Not on file     Social/Functional History  Lives With:  (will go stay with sister, described as below)  Type of Home: House  Home Layout: Able to Live on Main level with bedroom/bathroom  Home Access: Ramped entrance  Bathroom Shower/Tub: Tub/Shower unit  Bathroom Toilet: Standard  Bathroom Equipment:  (none)  Bathroom Accessibility: Accessible  Home Equipment: Cane, Rollator  Has the patient had two or more falls in the past year or any fall with injury in the past year?: No (except reason for admission)  Receives Help From: Family (sisters, nieces, nephews.)  ADL Assistance: Independent  Homemaking Assistance: Independent  Homemaking Responsibilities: Yes  Ambulation Assistance: Independent (cane use or furniture walking)  Transfer Assistance: Independent  Active : No  Patient's  Info: sisters,  Occupation: Retired  Leisure & Hobbies: read, cooking, home shows,    Family History:       Problem Relation Age of Onset    Hypertension Mother     Breast Cancer Mother     Hypertension Father     Stroke Father     Tuberculosis Sister     Hypertension Sister     Colon Cancer Sister     Heart Disease Sister     Allergies Sister     Migraines Sister     Hypertension Brother     Heart Disease Brother            Physical Exam:    BP (!) 143/94   Pulse 95   Temp 97.3 °F (36.3 °C) (Temporal)   Resp 19   Ht 5' 2\" (1.575 m)   Wt 293 lb 14 oz (133.3 kg)   SpO2 96%   BMI 53.75 kg/m²     General appearance: alert, appears stated age, cooperative, and no distress  HEENT: Normocephalic, without obvious abnormality, atraumatic               Eyes: conjunctivae clear. Throat: tongue normal.               Neck:  symmetrical, trachea midline. Pulm: clear to auscultation bilaterally. Cardiac: regular rate and rhythm, no murmur. Abdomen: soft, non-tender; bowel sounds normal.  MSK: extremities normal, atraumatic, no edema, normal tone. ROM: Functional range of motion upper extremities and right lower extremity left lower extremity with brace unable straight leg raise on left  Mental status/Psych: Alert, orientedX3, thought content appropriate. Knew Year, president and location follows commands, names object  Skin:     Neuro:      Sensory: Intact in BUE and BLE to soft and pin sensation. Motor: Muscle tone and bulk are normal bilaterally. No pronator drift./5 upper extremities and right lower extremity 3-4/5 left ankle straight leg raise left lower extremity      Coordination: finger to nose normal bilaterally.       Diagnostics:  CBC   Lab Results   Component Value Date/Time    WBC 13.4 12/13/2022 05:58 AM    RBC 3.25 12/13/2022 05:58 AM    HGB 9.2 12/13/2022 05:58 AM    HCT 27.9 12/13/2022 05:58 AM    MCV 85.8 12/13/2022 05:58 AM    RDW 13.0 12/13/2022 05:58 AM     12/13/2022 05:58 AM     BMP    Lab Results   Component Value Date/Time     12/13/2022 04:32 AM    K 4.5 12/13/2022 04:32 AM     12/13/2022 04:32 AM    CO2 24 12/13/2022 04:32 AM    BUN 13 12/13/2022 04:32 AM     Uric Acid  No components found for: URIC  VITAMIN B12 No components found for: B12  PT/INR  No results found for: PTINR      Impression: Ms. Sandra Brennan is a 60 y.o. female with a history of Avulsion fracture    Fall with left quadriceps tendon rupture/patella avulsion-status post quadriceps repair and patella ORIF on 12/12-weightbearing as tolerated with maintain knee immobilizer at all times  History of CVA-Lipitor  Hypertension/hyperlipidemia-Norvasc, Lipitor, Coreg, Dralzine  Osteoarthritis  Pain Motrin, Tylenol, gabapentin, Robaxin  Anemia-Hemoglobin 9.0  Leukocytosis  BMI 53.75, 293 pounds    Recommendations:  1. Diagnosis: Quadricep tendon rupture/patella avulsion status post repair and ORIF  2. Therapy: has PT and OT needs - Mod assist  ambulation max assist lower extremities  3. Medical  Necessity: As above  4. Support: Clarify, per notes plans to go to sisters one-story home with ramp - verified with family-sister and patient present  5. Rehab recommendation: Would benefit from acute inpatient rehabilitation when medically ready however patient considering home discharge  6. DVT proph: Lovenox    It was my pleasure to evaluate Estuardo Camarillo today. Please call with questions. Lobo Goss. Estella Dimas MD          This note is created with the assistance of a speech recognition program.  While intending to generate a document that actually reflects the content of the visit, the document can still have some errors including those of syntax and sound a like substitutions which may escape proof reading.   In such instances, actual meaning can be extrapolated by contextual diversion

## 2022-12-14 NOTE — PROGRESS NOTES
PROGRESS NOTE          PATIENT NAME: Rowena [facility] RECORD NO. [id number]  DATE: 2022  SURGEON: Allison Au  PRIMARY CARE PHYSICIAN: DEMARCUS Devine CNP    HD: # 4    ASSESSMENT    Patient Active Problem List   Diagnosis    Syncope    Osteoarthritis    History of CVA (cerebrovascular accident)    Essential hypertension    Vertigo    Chronic allergic rhinitis    Morbid obesity with BMI of 50.0-59.9, adult (HCC)    Mixed hyperlipidemia    Avulsion fracture    Closed displaced fracture of left patella       MEDICAL DECISION MAKING AND PLAN    59-year-old female with PMH including history of stroke \"in \" with no residual deficits and HTN who originally presented to the ED on 12/10/2022 after a mechanical fall where her purse became wrapped around her walker causing her to fall down 4-5 stairs inside her house. She suffered a left 2.4 cm superiorly displaced avulsion fracture at the upper pole of the patella due to the fall. Medically stable for discharge. Left quadriceps tendon rupture/patella avulsion s/p quadriceps repair and patella ORIF  - Postop day 2  - WBAT LLE with knee immobilizer. Please maintain knee immobilizer and do not remove  - PT/OT for dispo planning. Will need DME if going home  - pain control  - encourage IS  - DVT ppx: Lovenox  - Dispo: No further orthopedic surgery intervention. Ortho changed dressing this morning. Okay to discharge from an Ortho standpoint. Follow-up orthopedic/trauma clinic on 2022    Chief Complaint: \"doing well\"    SUBJECTIVE    Patient seen resting comfortably in bed. She is motivated to improve and go home. She wishes to go home with home health. She understands that she will need DME. Plan for working with PT/OT this morning.   Medically stable for discharge likely later today    OBJECTIVE  VITALS: Temp: Temp: 98.2 °F (36.8 °C)Temp  Av.8 °F (36.6 °C)  Min: 97.3 °F (36.3 °C)  Max: 98.2 °F (83.2 °C) BP Systolic (90EJA), PRD:777 , Min:129 , IPQ:220   Diastolic (22YLF), CXP:84, Min:70, Max:84   Pulse Pulse  Av  Min: 81  Max: 102 Resp Resp  Av.5  Min: 15  Max: 18 Pulse ox SpO2  Av %  Min: 93 %  Max: 100 %  Constitutional:       General: She is not in acute distress. Appearance: She is obese. She is not ill-appearing. HENT:      Head: Normocephalic and atraumatic. Right Ear: External ear normal.      Left Ear: External ear normal.      Nose: Nose normal. No congestion. Mouth/Throat:      Mouth: Mucous membranes are moist.      Pharynx: Oropharynx is clear. Eyes:      General: No scleral icterus. Extraocular Movements: Extraocular movements intact. Pupils: Pupils are equal, round, and reactive to light. Cardiovascular:      Rate and Rhythm: Normal rate and regular rhythm. Pulses: Normal pulses. Heart sounds: Normal heart sounds. Pulmonary:      Effort: Pulmonary effort is normal. No respiratory distress. Breath sounds: Normal breath sounds. Abdominal:      General: There is no distension. Palpations: Abdomen is soft. Tenderness: There is no abdominal tenderness. There is no guarding. Musculoskeletal:         General: Tenderness and signs of injury present. No deformity. Cervical back: Neck supple. Comments: Left LE tenderness to palpation. LLE wrapped and in brace. 3+ edema bilateral lower extremities  Skin:     General: Skin is warm and dry. Neurological:      Mental Status: She is oriented to person, place, and time. Mental status is at baseline. Sensory: No sensory deficit. Motor: No weakness. Psychiatric:         Mood and Affect: Mood normal.         Behavior: Behavior normal.         Thought Content: Thought content normal.         Judgment: Judgment normal.        I/O last 3 completed shifts: In: 3700.1 [P.O.:1740; I.V.:1810.1; IV Piggyback:150]  Out: 3234 [Urine:1550; Blood:20]    Drain/tube output:  In: 2650.1 [P.O.:1740;  I.V.:810.1]  Out: 1550 [Urine:1550]    LAB:  CBC:   Recent Labs     12/12/22  0815 12/13/22  0558   WBC 8.4 13.4*   HGB 9.6* 9.2*   HCT 30.4* 27.9*   MCV 87.1 85.8    220     BMP:   Recent Labs     12/12/22  0815 12/13/22  0432   * 138   K 3.6* 4.5    103   CO2 24 24   BUN 9 13   CREATININE 0.53 0.61   GLUCOSE 83 120*     COAGS:   No results for input(s): APTT, PROT, INR in the last 72 hours. RADIOLOGY:  XR FEMUR LEFT (MIN 2 VIEWS)    Result Date: 12/10/2022  Left shoulder: 1. Mild degenerative changes as above. 2. No acute fracture or dislocation. Left femur: 1. Diffuse osteopenia. No acute osseous abnormality. 2. Tricompartmental osteoarthrosis of the left knee. Left knee: 1. Suspected age-indeterminate fracture at the upper pole of the patella. 2. Tricompartmental osteoarthrosis. Severe degenerative changes of the medial compartment. Left tib fib: 1. Suspected superiorly displaced avulsion fractures at the upper pole of the patella superiorly displaced by 2.4 cm. Small suprapatellar joint effusion. 2. Moderate degenerative changes as detailed above. 3. Tibia and fibula appear intact. 4. Mild-to-moderate soft tissue edema of the left leg and ankle. XR KNEE LEFT (1-2 VIEWS)    Result Date: 12/10/2022  Age-indeterminate fracture and/or bony resorption of the upper pole of the patella. Small joint effusion. Moderate edema. Underlying infection/osteomyelitis not excluded on the basis of this study in the appropriate clinical setting. XR KNEE LEFT (3 VIEWS)    Result Date: 12/10/2022  Left shoulder: 1. Mild degenerative changes as above. 2. No acute fracture or dislocation. Left femur: 1. Diffuse osteopenia. No acute osseous abnormality. 2. Tricompartmental osteoarthrosis of the left knee. Left knee: 1. Suspected age-indeterminate fracture at the upper pole of the patella. 2. Tricompartmental osteoarthrosis. Severe degenerative changes of the medial compartment. Left tib fib: 1.  Suspected superiorly displaced avulsion fractures at the upper pole of the patella superiorly displaced by 2.4 cm. Small suprapatellar joint effusion. 2. Moderate degenerative changes as detailed above. 3. Tibia and fibula appear intact. 4. Mild-to-moderate soft tissue edema of the left leg and ankle. XR TIBIA FIBULA LEFT (2 VIEWS)    Result Date: 12/10/2022  Left shoulder: 1. Mild degenerative changes as above. 2. No acute fracture or dislocation. Left femur: 1. Diffuse osteopenia. No acute osseous abnormality. 2. Tricompartmental osteoarthrosis of the left knee. Left knee: 1. Suspected age-indeterminate fracture at the upper pole of the patella. 2. Tricompartmental osteoarthrosis. Severe degenerative changes of the medial compartment. Left tib fib: 1. Suspected superiorly displaced avulsion fractures at the upper pole of the patella superiorly displaced by 2.4 cm. Small suprapatellar joint effusion. 2. Moderate degenerative changes as detailed above. 3. Tibia and fibula appear intact. 4. Mild-to-moderate soft tissue edema of the left leg and ankle. XR SHOULDER LEFT (MIN 2 VIEWS)    Result Date: 12/10/2022  Left shoulder: 1. Mild degenerative changes as above. 2. No acute fracture or dislocation. Left femur: 1. Diffuse osteopenia. No acute osseous abnormality. 2. Tricompartmental osteoarthrosis of the left knee. Left knee: 1. Suspected age-indeterminate fracture at the upper pole of the patella. 2. Tricompartmental osteoarthrosis. Severe degenerative changes of the medial compartment. Left tib fib: 1. Suspected superiorly displaced avulsion fractures at the upper pole of the patella superiorly displaced by 2.4 cm. Small suprapatellar joint effusion. 2. Moderate degenerative changes as detailed above. 3. Tibia and fibula appear intact. 4. Mild-to-moderate soft tissue edema of the left leg and ankle. XR CHEST PORTABLE    Result Date: 12/10/2022  No acute focal airspace consolidation.          [facility] DO  12/11/22, 6:41 AM

## 2022-12-14 NOTE — PROGRESS NOTES
Orthopedic Progress Note    Patient:  Mandie Gruber  YOB: 1960     60 y.o. female    Subjective:  Patient seen and examined  No complaints or concerns. No issue overnight. Pain controlled. Denies fever, HA, CP, SOB, N/V, dysuria.  +Flatus, no BM yet. Patient met with PT yesterday, required moderate assist x 2 with bed mobility, to stand to a bariatric rolling walker, and ambulated 1 foot with bariatric rolling walker. Vitals reviewed, afebrile    Objective:   Vitals:    12/13/22 1924   BP: 129/70   Pulse: 81   Resp: 18   Temp: 98.2 °F (36.8 °C)   SpO2: 93%     Gen: NAD, Cooperative. Cardiovascular: Regular Rate  Respiratory: No Acute Respiratory Distress  MSK:  LLE: Knee immobilizer on. Dressings clean, dry, intact. Dressings changed today and media pic below taken. Patient able to move exposed digits and plantar/dorsiflex ankle. Sural, Saphenous, Superficial/Deep Peroneal, and Plantar Nerve Distribution SILT. DP and PT Pulses 2+ with BCR. Recent Labs     12/13/22  0432 12/13/22  0558   WBC  --  13.4*   HGB  --  9.2*   HCT  --  27.9*   PLT  --  220     --    K 4.5  --    BUN 13  --    CREATININE 0.61  --    GLUCOSE 120*  --       Meds: See Rec for Complete List    Impression 60 y.o. female who sustained a fall from multiple steps, being seen for:     - Left quadriceps tendon rupture/patella avulsion s/p quadriceps repair and patella ORIF, POD#2    Plan  - No future plan for orthopedic intervention at this time  - WB status: Weight bearing as tolerated with the right leg. Maintain knee immobilizer on, do not remove. - Dressing: please maintain and reinforce as needed; changed today by Ortho team.  - Pain control:   Per primary team     - Ice/Elevate for Pain and Swelling  - Encourage Incentive Spirometry use  - PT/OT  - OK to discharge from an orthopedic standpoint  - F/u Dr. Madelyn Chow' clinic 12/28/22.   - Please page ortho with any questions    _________________________________  Spencer Bosch D.O. Orthopedic Surgery Resident, PGY-1  [place] [id number], [place]    PGY-2 Addendum    Patient seen and examined. Agree with subjective and objective portions from Dr. Junior Shaw.     The patient is a 60 y.o. female with the perioperative course as listed above. WBAT to RLE with knee immobilizer on. Maintain knee immobilizer on at all times. Dressings changed. Encourage IS and mobilization with PT. No further plans for surgery per ortho. Page ortho with any questions/concerns.     Electronically signed by Bc Chan DO 5:29 AM 12/14/2022

## 2022-12-14 NOTE — DISCHARGE SUMMARY
DISCHARGE SUMMARY:    PATIENT NAME:  Shayy Ruiz  YOB: 1960  MEDICAL RECORD NO. [id number]  DATE: 12/14/22  PRIMARY CARE PHYSICIAN: DEMARCUS De León CNP  ADMIT DATE:  12/10/2022    DISCHARGE DATE:  12/14/2022  DISPOSITION:  Acute rehab facility  ADMITTING DIAGNOSIS:   Patellar fracture    DIAGNOSIS:   Patient Active Problem List   Diagnosis    Syncope    Osteoarthritis    History of CVA (cerebrovascular accident)    Essential hypertension    Vertigo    Chronic allergic rhinitis    Morbid obesity with BMI of 50.0-59.9, adult ([address]    Mixed hyperlipidemia    Avulsion fracture    Closed displaced fracture of left patella       CONSULTANTS:  orthopedic surgery, cardiology    PROCEDURES:   Left quadriceps tendon rupture repair/patella avulsion s/p quadriceps repair and patella ORIF    HOSPITAL COURSE:   Shayy Ruiz is a 60 y.o. female who was admitted on 12/10/2022  Hospital Course:  58-year-old female with PMH including history of stroke \"in 2010\" with no residual deficits and HTN who originally presented to the ED on 12/10/2022 after a mechanical fall where her purse became wrapped around her walker causing her to fall down 4-5 stairs inside her house. She suffered a torn quadriceps tendon with left 2.4 cm superiorly displaced avulsion fracture at the upper pole of the patella due to the fall. Cardiology consulted for cardiac clearance which included a normal echo. Ultimately patient underwent left quadriceps tendon rupture repair/patella avulsion s/p quadriceps repair and patella ORIF by orthopedic surgery on 12/12/2022. Evaluated by PT/OT who recommended acute rehab facility, which is where she is being discharged to today. Medically stable for discharge. Labs and imaging were followed daily. On day of discharge Shayy Ruiz  was tolerating a regular diet  had adequate analgeia on oral medications  had no signs of complication.   She was deemed medically stable for discharged to Acute Rehab        PHYSICAL EXAMINATION:        Discharge Vitals:  height is 5' 2\" (1.575 m) and weight is 293 lb 14 oz (133.3 kg). Her temporal temperature is 98.2 °F (36.8 °C). Her blood pressure is 127/62 and her pulse is 88. Her respiration is 16 and oxygen saturation is 90%. Exam on day of discharge:  Constitutional:       General: She is not in acute distress. Appearance: She is obese. She is not ill-appearing. HENT:      Head: Normocephalic and atraumatic. Right Ear: External ear normal.      Left Ear: External ear normal.      Nose: Nose normal. No congestion. Mouth/Throat:      Mouth: Mucous membranes are moist.      Pharynx: Oropharynx is clear. Eyes:      General: No scleral icterus. Extraocular Movements: Extraocular movements intact. Pupils: Pupils are equal, round, and reactive to light. Cardiovascular:      Rate and Rhythm: Normal rate and regular rhythm. Pulses: Normal pulses. Heart sounds: Normal heart sounds. Pulmonary:      Effort: Pulmonary effort is normal. No respiratory distress. Breath sounds: Normal breath sounds. Abdominal:      General: There is no distension. Palpations: Abdomen is soft. Tenderness: There is no abdominal tenderness. There is no guarding. Musculoskeletal:         General: Tenderness and signs of injury present. No deformity. Cervical back: Neck supple. Comments: Left LE tenderness to palpation. LLE wrapped and in brace. 3+ edema bilateral lower extremities  Skin:     General: Skin is warm and dry. Neurological:      Mental Status: She is oriented to person, place, and time. Mental status is at baseline. Sensory: No sensory deficit. Motor: No weakness. Psychiatric:         Mood and Affect: Mood normal.         Behavior: Behavior normal.         Thought Content:  Thought content normal.         Judgment: Judgment normal.        LABS:     Recent Labs     12/12/22  0815 12/13/22  6877 12/13/22  0558 12/14/22  1056   WBC 8.4  --  13.4* 10.7   HGB 9.6*  --  9.2* 9.0*   HCT 30.4*  --  27.9* 27.8*     --  220 200   * 138  --  136   K 3.6* 4.5  --  3.6*    103  --  100   CO2 24 24  --  24   BUN 9 13  --  13   CREATININE 0.53 0.61  --  0.55       DIAGNOSTIC TESTS:    XR FEMUR LEFT (MIN 2 VIEWS)    Result Date: 12/10/2022  EXAMINATION: THREE XRAY VIEWS OF THE LEFT KNEE; 3 XRAY VIEWS OF THE LEFT SHOULDER; 4 XRAY VIEWS OF THE LEFT FEMUR; 4 XRAY VIEWS OF THE LEFT TIBIA AND FIBULA 12/10/2022 11:05 am COMPARISON: None. HISTORY: ORDERING SYSTEM PROVIDED HISTORY: eval for fracture TECHNOLOGIST PROVIDED HISTORY: eval for fracture 69-year-old female; evaluate for fracture FINDINGS: Left shoulder: Mild degenerative changes of the left AC and glenohumeral joints. Visualized left-sided ribs appear intact. No acute fracture or dislocation. Left femur: Left femoral head properly located within the left acetabulum. Moderate tricompartmental osteophyte spurring. Severe narrowing and remodeling of the medial compartment. Diffuse osteopenia. No acute fracture or dislocation. Left femur appears intact. Left knee: Moderate tricompartmental osteophyte spurring. Severe narrowing and remodeling of the medial compartment. No suspicious osteolytic or osteoblastic lesions. Suspected age-indeterminate fracture of the upper pole of the patella. Small joint effusion. Left tib fib: Tricompartmental osteoarthrosis of the left knee with severe degenerative changes in the medial compartment. Tibia and fibula appear intact. Mild to moderate soft tissue edema of the left leg and ankle. Moderate degenerative changes of the tibiotalar, tarsal metatarsal joints, and midfoot. Pes planus. Mild plantar calcaneal spur. Moderate distal Achilles enthesopathy. Scattered soft tissue calcifications. No tibiotalar joint effusion.  Suspected superiorly displaced avulsion fractures from the upper pole of the patella retracted by 2.4 cm. Small suprapatellar joint effusion. Left shoulder: 1. Mild degenerative changes as above. 2. No acute fracture or dislocation. Left femur: 1. Diffuse osteopenia. No acute osseous abnormality. 2. Tricompartmental osteoarthrosis of the left knee. Left knee: 1. Suspected age-indeterminate fracture at the upper pole of the patella. 2. Tricompartmental osteoarthrosis. Severe degenerative changes of the medial compartment. Left tib fib: 1. Suspected superiorly displaced avulsion fractures at the upper pole of the patella superiorly displaced by 2.4 cm. Small suprapatellar joint effusion. 2. Moderate degenerative changes as detailed above. 3. Tibia and fibula appear intact. 4. Mild-to-moderate soft tissue edema of the left leg and ankle. XR KNEE LEFT (1-2 VIEWS)    Result Date: 12/10/2022  EXAMINATION: TWO XRAY VIEWS OF THE LEFT KNEE 12/10/2022 1:40 pm COMPARISON: 12/10/2022 HISTORY: ORDERING SYSTEM PROVIDED HISTORY: fractured patella. TECHNOLOGIST PROVIDED HISTORY: [place]/merchant view and  new lateral views please fractured patella. Reason for Exam: very difficult to position patient 50-year-old female with possible fracture patella FINDINGS: There is age-indeterminate fracture versus bony resorption of the superior pole of the patella. Small joint effusion. Moderate edema along the anterior knee. Remaining visualized osseous structures appear intact. Age-indeterminate fracture and/or bony resorption of the upper pole of the patella. Small joint effusion. Moderate edema. Underlying infection/osteomyelitis not excluded on the basis of this study in the appropriate clinical setting. XR KNEE LEFT (3 VIEWS)    Result Date: 12/10/2022  EXAMINATION: THREE XRAY VIEWS OF THE LEFT KNEE; 3 XRAY VIEWS OF THE LEFT SHOULDER; 4 XRAY VIEWS OF THE LEFT FEMUR; 4 XRAY VIEWS OF THE LEFT TIBIA AND FIBULA 12/10/2022 11:05 am COMPARISON: None.  HISTORY: ORDERING SYSTEM PROVIDED HISTORY: eval for fracture TECHNOLOGIST PROVIDED HISTORY: eval for fracture 57-year-old female; evaluate for fracture FINDINGS: Left shoulder: Mild degenerative changes of the left AC and glenohumeral joints. Visualized left-sided ribs appear intact. No acute fracture or dislocation. Left femur: Left femoral head properly located within the left acetabulum. Moderate tricompartmental osteophyte spurring. Severe narrowing and remodeling of the medial compartment. Diffuse osteopenia. No acute fracture or dislocation. Left femur appears intact. Left knee: Moderate tricompartmental osteophyte spurring. Severe narrowing and remodeling of the medial compartment. No suspicious osteolytic or osteoblastic lesions. Suspected age-indeterminate fracture of the upper pole of the patella. Small joint effusion. Left tib fib: Tricompartmental osteoarthrosis of the left knee with severe degenerative changes in the medial compartment. Tibia and fibula appear intact. Mild to moderate soft tissue edema of the left leg and ankle. Moderate degenerative changes of the tibiotalar, tarsal metatarsal joints, and midfoot. Pes planus. Mild plantar calcaneal spur. Moderate distal Achilles enthesopathy. Scattered soft tissue calcifications. No tibiotalar joint effusion. Suspected superiorly displaced avulsion fractures from the upper pole of the patella retracted by 2.4 cm. Small suprapatellar joint effusion. Left shoulder: 1. Mild degenerative changes as above. 2. No acute fracture or dislocation. Left femur: 1. Diffuse osteopenia. No acute osseous abnormality. 2. Tricompartmental osteoarthrosis of the left knee. Left knee: 1. Suspected age-indeterminate fracture at the upper pole of the patella. 2. Tricompartmental osteoarthrosis. Severe degenerative changes of the medial compartment. Left tib fib: 1. Suspected superiorly displaced avulsion fractures at the upper pole of the patella superiorly displaced by 2.4 cm.   Small suprapatellar joint effusion. 2. Moderate degenerative changes as detailed above. 3. Tibia and fibula appear intact. 4. Mild-to-moderate soft tissue edema of the left leg and ankle. XR TIBIA FIBULA LEFT (2 VIEWS)    Result Date: 12/10/2022  EXAMINATION: THREE XRAY VIEWS OF THE LEFT KNEE; 3 XRAY VIEWS OF THE LEFT SHOULDER; 4 XRAY VIEWS OF THE LEFT FEMUR; 4 XRAY VIEWS OF THE LEFT TIBIA AND FIBULA 12/10/2022 11:05 am COMPARISON: None. HISTORY: ORDERING SYSTEM PROVIDED HISTORY: eval for fracture TECHNOLOGIST PROVIDED HISTORY: eval for fracture 77-year-old female; evaluate for fracture FINDINGS: Left shoulder: Mild degenerative changes of the left AC and glenohumeral joints. Visualized left-sided ribs appear intact. No acute fracture or dislocation. Left femur: Left femoral head properly located within the left acetabulum. Moderate tricompartmental osteophyte spurring. Severe narrowing and remodeling of the medial compartment. Diffuse osteopenia. No acute fracture or dislocation. Left femur appears intact. Left knee: Moderate tricompartmental osteophyte spurring. Severe narrowing and remodeling of the medial compartment. No suspicious osteolytic or osteoblastic lesions. Suspected age-indeterminate fracture of the upper pole of the patella. Small joint effusion. Left tib fib: Tricompartmental osteoarthrosis of the left knee with severe degenerative changes in the medial compartment. Tibia and fibula appear intact. Mild to moderate soft tissue edema of the left leg and ankle. Moderate degenerative changes of the tibiotalar, tarsal metatarsal joints, and midfoot. Pes planus. Mild plantar calcaneal spur. Moderate distal Achilles enthesopathy. Scattered soft tissue calcifications. No tibiotalar joint effusion. Suspected superiorly displaced avulsion fractures from the upper pole of the patella retracted by 2.4 cm. Small suprapatellar joint effusion. Left shoulder: 1. Mild degenerative changes as above.  2. No acute fracture or dislocation. Left femur: 1. Diffuse osteopenia. No acute osseous abnormality. 2. Tricompartmental osteoarthrosis of the left knee. Left knee: 1. Suspected age-indeterminate fracture at the upper pole of the patella. 2. Tricompartmental osteoarthrosis. Severe degenerative changes of the medial compartment. Left tib fib: 1. Suspected superiorly displaced avulsion fractures at the upper pole of the patella superiorly displaced by 2.4 cm. Small suprapatellar joint effusion. 2. Moderate degenerative changes as detailed above. 3. Tibia and fibula appear intact. 4. Mild-to-moderate soft tissue edema of the left leg and ankle. XR SHOULDER LEFT (MIN 2 VIEWS)    Result Date: 12/10/2022  EXAMINATION: THREE XRAY VIEWS OF THE LEFT KNEE; 3 XRAY VIEWS OF THE LEFT SHOULDER; 4 XRAY VIEWS OF THE LEFT FEMUR; 4 XRAY VIEWS OF THE LEFT TIBIA AND FIBULA 12/10/2022 11:05 am COMPARISON: None. HISTORY: ORDERING SYSTEM PROVIDED HISTORY: eval for fracture TECHNOLOGIST PROVIDED HISTORY: eval for fracture 21-year-old female; evaluate for fracture FINDINGS: Left shoulder: Mild degenerative changes of the left AC and glenohumeral joints. Visualized left-sided ribs appear intact. No acute fracture or dislocation. Left femur: Left femoral head properly located within the left acetabulum. Moderate tricompartmental osteophyte spurring. Severe narrowing and remodeling of the medial compartment. Diffuse osteopenia. No acute fracture or dislocation. Left femur appears intact. Left knee: Moderate tricompartmental osteophyte spurring. Severe narrowing and remodeling of the medial compartment. No suspicious osteolytic or osteoblastic lesions. Suspected age-indeterminate fracture of the upper pole of the patella. Small joint effusion. Left tib fib: Tricompartmental osteoarthrosis of the left knee with severe degenerative changes in the medial compartment. Tibia and fibula appear intact.   Mild to moderate soft tissue edema of the left leg and ankle.  Moderate degenerative changes of the tibiotalar, tarsal metatarsal joints, and midfoot. Pes planus. Mild plantar calcaneal spur. Moderate distal Achilles enthesopathy. Scattered soft tissue calcifications. No tibiotalar joint effusion. Suspected superiorly displaced avulsion fractures from the upper pole of the patella retracted by 2.4 cm. Small suprapatellar joint effusion. Left shoulder: 1. Mild degenerative changes as above. 2. No acute fracture or dislocation. Left femur: 1. Diffuse osteopenia. No acute osseous abnormality. 2. Tricompartmental osteoarthrosis of the left knee. Left knee: 1. Suspected age-indeterminate fracture at the upper pole of the patella. 2. Tricompartmental osteoarthrosis. Severe degenerative changes of the medial compartment. Left tib fib: 1. Suspected superiorly displaced avulsion fractures at the upper pole of the patella superiorly displaced by 2.4 cm. Small suprapatellar joint effusion. 2. Moderate degenerative changes as detailed above. 3. Tibia and fibula appear intact. 4. Mild-to-moderate soft tissue edema of the left leg and ankle. XR CHEST PORTABLE    Result Date: 12/10/2022  EXAMINATION: ONE XRAY VIEW OF THE CHEST 12/10/2022 1:53 pm COMPARISON: 06/20/2008 HISTORY: ORDERING SYSTEM PROVIDED HISTORY: fall down stairs TECHNOLOGIST PROVIDED HISTORY: fall down stairs Reason for Exam: port upright 57-year-old female with fall down the stairs FINDINGS: Portable upright view of the chest Cardiac monitor leads overlie the chest. Cardiac and mediastinal contours unchanged and within normal limits. No acute focal airspace consolidation or pleural effusions. Trachea midline. No pneumothorax. No acute osseous abnormality. No acute focal airspace consolidation.        DISCHARGE INSTRUCTIONS     Discharge Medications:        Medication List        START taking these medications      carvedilol 6.25 MG tablet  Commonly known as: COREG  Take 1 tablet by mouth 2 times daily (with meals)     gabapentin 300 MG capsule  Commonly known as: NEURONTIN  Take 1 capsule by mouth 3 times daily for 10 days. methocarbamol 750 MG tablet  Commonly known as: ROBAXIN  Take 1 tablet by mouth 3 times daily for 10 days     oxyCODONE 5 MG immediate release tablet  Commonly known as: ROXICODONE  Take 1 tablet by mouth every 6 hours as needed for Pain for up to 3 days. polyethylene glycol 17 g packet  Commonly known as: GLYCOLAX  Take 17 g by mouth daily            CONTINUE taking these medications      amLODIPine 10 MG tablet  Commonly known as: NORVASC  TAKE 1 TABLET BY MOUTH EVERY DAY     aspirin-acetaminophen-caffeine 250-250-65 MG per tablet  Commonly known as: EXCEDRIN MIGRAINE  Take 1 tablet by mouth every 6 hours as needed for Pain     aspirin-dipyridamole  MG per extended release capsule  Commonly known as: AGGRENOX  Take 1 capsule by mouth 2 times daily     atorvastatin 20 MG tablet  Commonly known as: LIPITOR  TAKE 1 TABLET BY MOUTH EVERY DAY     hydrALAZINE 25 MG tablet  Commonly known as: APRESOLINE  Take 1 tab twice a day     hydroCHLOROthiazide 25 MG tablet  Commonly known as: HYDRODIURIL  TAKE 1 TABLET BY MOUTH EVERY DAY     loratadine 10 MG tablet  Commonly known as: CLARITIN  TAKE 1 TABLET BY MOUTH EVERY DAY     meclizine 12.5 MG tablet  Commonly known as: ANTIVERT  TAKE 1 TABLET BY MOUTH THREE TIMES A DAY AS NEEDED     meloxicam 15 MG tablet  Commonly known as: MOBIC  TAKE 1 TABLET BY MOUTH EVERY DAY     Misc. Devices Misc  Provide handicap placard, expires 5 years from this date 10/20/2027  Medical conditions prevent the ability to walk long distances     traMADol 50 MG tablet  Commonly known as: ULTRAM  Take 1 tablet by mouth 3 times daily as needed for Pain for up to 30 days.                Where to Get Your Medications        You can get these medications from any pharmacy    Bring a paper prescription for each of these medications  carvedilol 6.25 MG tablet  gabapentin 300 MG capsule  methocarbamol 750 MG tablet  oxyCODONE 5 MG immediate release tablet  polyethylene glycol 17 g packet       Diet: ADULT DIET; Regular diet as tolerated  Activity: As instructed; WBAT LLE with knee immobilizer. Please maintain knee immobilizer and do not remove  Wound Care: Daily and as needed.     DISPOSITION: Acute Rehab    Follow-up:  Juanita Kraft, APRN - CNP  [address] [address]  [address] [facility]  [phone]    Schedule an appointment as soon as possible for a visit  Follow up with PCP re: hospital visit    Jadiel Fletcher, [address]  [facility] [address] [facility] [address] [facility] [address]  [phone]    Go on 12/28/2022  Follow up with Orthopedic Surgery at 1:40 pm    [address],  [address] [place] [address] Drive [address] [phone]            SIGNED:  Grey Gunn DO   12/14/2022, 4:21 PM  Time Spent for discharge: 35 minutes

## 2022-12-14 NOTE — CARE COORDINATION
TRANSITIONAL CARE PLANNING/ 2 Rehab Aron Day: 4    Reason for Admission: Avulsion fracture [T14. 8XXA]  Closed displaced fracture of left patella, unspecified fracture morphology, initial encounter [S82.002A]         Readmission Risk              Risk of Unplanned Readmission:  7            Patient goals/Treatment Preferences/Transitional Plan:   PT recommending ARU   Await pm&r     14:49 met with patient reviewed aru options agreeable to referral to [facility] called [facility] left vm    15:18 vm from [facility] reviewing    15:40 [facility] accepting     16:26 dc order in  [facility] waiting for physician approval to arrange transport     16:37 call from [facility] can accept today after 19:00  Call report to [phone]  Faxed orders to [phone]     17:05 [facility] confirmed  bt 19:00-19:30   Updated [facility] with [facility]     17:53 faxed commode, tub bend, walker orders/ f2f, facesheet to evelyn

## 2022-12-14 NOTE — PROGRESS NOTES
Patient requested to get Bivalent Covid vaccine before discharge. RN called pharmacy & was informed that patient can get Covid vaccine right before discharge. Patient not being discharged today but RN will pass on information during report to night shift.

## 2022-12-15 ENCOUNTER — HOSPITAL ENCOUNTER (OUTPATIENT)
Age: 62
Setting detail: SPECIMEN
Discharge: HOME | End: 2022-12-15

## 2022-12-15 LAB
ALBUMIN SERPL-MCNC: 3.3 G/DL (ref 3.5–5.2)
ALP BLD-CCNC: 82 U/L (ref 35–104)
ALT SERPL-CCNC: 37 U/L (ref 5–33)
ANION GAP SERPL CALCULATED.3IONS-SCNC: 12 MMOL/L (ref 9–17)
AST SERPL-CCNC: 30 U/L
BILIRUB SERPL-MCNC: 0.7 MG/DL (ref 0.3–1.2)
BUN BLDV-MCNC: 8 MG/DL (ref 8–23)
BUN/CREAT BLD: 15 (ref 9–20)
CALCIUM SERPL-MCNC: 9 MG/DL (ref 8.6–10.4)
CHLORIDE BLD-SCNC: 101 MMOL/L (ref 98–107)
CO2: 26 MMOL/L (ref 20–31)
CREAT SERPL-MCNC: 0.55 MG/DL (ref 0.5–0.9)
GFR SERPL CREATININE-BSD FRML MDRD: >60 ML/MIN/1.73M2
GLUCOSE BLD-MCNC: 90 MG/DL (ref 70–99)
HCT VFR BLD CALC: 28.7 % (ref 36.3–47.1)
HEMOGLOBIN: 8.9 G/DL (ref 11.9–15.1)
MCH RBC QN AUTO: 27.2 PG (ref 25.2–33.5)
MCHC RBC AUTO-ENTMCNC: 31 G/DL (ref 28.4–34.8)
MCV RBC AUTO: 87.8 FL (ref 82.6–102.9)
NRBC AUTOMATED: 0 PER 100 WBC
PDW BLD-RTO: 13.2 % (ref 11.8–14.4)
PLATELET # BLD: 302 K/UL (ref 138–453)
PMV BLD AUTO: 9 FL (ref 8.1–13.5)
POTASSIUM SERPL-SCNC: 3.8 MMOL/L (ref 3.7–5.3)
RBC # BLD: 3.27 M/UL (ref 3.95–5.11)
SODIUM BLD-SCNC: 139 MMOL/L (ref 135–144)
TOTAL PROTEIN: 7 G/DL (ref 6.4–8.3)
WBC # BLD: 10.1 K/UL (ref 3.5–11.3)

## 2022-12-15 PROCEDURE — P9603 ONE-WAY ALLOW PRORATED MILES: HCPCS

## 2022-12-15 PROCEDURE — 80053 COMPREHEN METABOLIC PANEL: CPT

## 2022-12-15 PROCEDURE — 85027 COMPLETE CBC AUTOMATED: CPT

## 2022-12-15 PROCEDURE — 36415 COLL VENOUS BLD VENIPUNCTURE: CPT

## 2022-12-15 NOTE — PROGRESS NOTES
RN went over discharge paperwork with patient at bedside & answered all questions she had pertaining to after discharge/rehab care. IV & telemetry removed. Transport set up for patient between [id number] by -- [facility] [address] [facility]. Patient left with discharge paperwork and all personal belongings.

## 2022-12-20 ENCOUNTER — HOSPITAL ENCOUNTER (OUTPATIENT)
Age: 62
Setting detail: SPECIMEN
Discharge: HOME | End: 2022-12-20

## 2022-12-20 LAB
ALBUMIN SERPL-MCNC: 3.6 G/DL (ref 3.5–5.2)
ALP BLD-CCNC: 96 U/L (ref 35–104)
ALT SERPL-CCNC: 32 U/L (ref 5–33)
ANION GAP SERPL CALCULATED.3IONS-SCNC: 13 MMOL/L (ref 9–17)
AST SERPL-CCNC: 21 U/L
BILIRUB SERPL-MCNC: 0.7 MG/DL (ref 0.3–1.2)
BUN BLDV-MCNC: 14 MG/DL (ref 8–23)
BUN/CREAT BLD: 19 (ref 9–20)
CALCIUM SERPL-MCNC: 9.4 MG/DL (ref 8.6–10.4)
CHLORIDE BLD-SCNC: 98 MMOL/L (ref 98–107)
CO2: 28 MMOL/L (ref 20–31)
CREAT SERPL-MCNC: 0.72 MG/DL (ref 0.5–0.9)
GFR SERPL CREATININE-BSD FRML MDRD: >60 ML/MIN/1.73M2
GLUCOSE BLD-MCNC: 92 MG/DL (ref 70–99)
HCT VFR BLD CALC: 29.9 % (ref 36.3–47.1)
HEMOGLOBIN: 8.9 G/DL (ref 11.9–15.1)
MCH RBC QN AUTO: 26.7 PG (ref 25.2–33.5)
MCHC RBC AUTO-ENTMCNC: 29.8 G/DL (ref 28.4–34.8)
MCV RBC AUTO: 89.8 FL (ref 82.6–102.9)
NRBC AUTOMATED: 0 PER 100 WBC
PDW BLD-RTO: 13.4 % (ref 11.8–14.4)
PLATELET # BLD: 499 K/UL (ref 138–453)
PMV BLD AUTO: 9 FL (ref 8.1–13.5)
POTASSIUM SERPL-SCNC: 3.7 MMOL/L (ref 3.7–5.3)
RBC # BLD: 3.33 M/UL (ref 3.95–5.11)
SODIUM BLD-SCNC: 139 MMOL/L (ref 135–144)
TOTAL PROTEIN: 7.9 G/DL (ref 6.4–8.3)
WBC # BLD: 11.6 K/UL (ref 3.5–11.3)

## 2022-12-20 PROCEDURE — 80053 COMPREHEN METABOLIC PANEL: CPT

## 2022-12-20 PROCEDURE — P9603 ONE-WAY ALLOW PRORATED MILES: HCPCS

## 2022-12-20 PROCEDURE — 36415 COLL VENOUS BLD VENIPUNCTURE: CPT

## 2022-12-20 PROCEDURE — 85027 COMPLETE CBC AUTOMATED: CPT

## 2022-12-27 ENCOUNTER — HOSPITAL ENCOUNTER (OUTPATIENT)
Age: 62
Setting detail: SPECIMEN
Discharge: HOME | End: 2022-12-27

## 2022-12-27 LAB
ALBUMIN SERPL-MCNC: 3.3 G/DL (ref 3.5–5.2)
ALP BLD-CCNC: 86 U/L (ref 35–104)
ALT SERPL-CCNC: 19 U/L (ref 5–33)
ANION GAP SERPL CALCULATED.3IONS-SCNC: 12 MMOL/L (ref 9–17)
AST SERPL-CCNC: 15 U/L
BILIRUB SERPL-MCNC: 0.3 MG/DL (ref 0.3–1.2)
BUN BLDV-MCNC: 19 MG/DL (ref 8–23)
BUN/CREAT BLD: 26 (ref 9–20)
CALCIUM SERPL-MCNC: 9.2 MG/DL (ref 8.6–10.4)
CHLORIDE BLD-SCNC: 101 MMOL/L (ref 98–107)
CO2: 26 MMOL/L (ref 20–31)
CREAT SERPL-MCNC: 0.73 MG/DL (ref 0.5–0.9)
GFR SERPL CREATININE-BSD FRML MDRD: >60 ML/MIN/1.73M2
GLUCOSE BLD-MCNC: 80 MG/DL (ref 70–99)
HCT VFR BLD CALC: 29.2 % (ref 36.3–47.1)
HEMOGLOBIN: 8.7 G/DL (ref 11.9–15.1)
MCH RBC QN AUTO: 26.9 PG (ref 25.2–33.5)
MCHC RBC AUTO-ENTMCNC: 29.8 G/DL (ref 28.4–34.8)
MCV RBC AUTO: 90.1 FL (ref 82.6–102.9)
NRBC AUTOMATED: 0 PER 100 WBC
PDW BLD-RTO: 13.3 % (ref 11.8–14.4)
PLATELET # BLD: 561 K/UL (ref 138–453)
PMV BLD AUTO: 8.8 FL (ref 8.1–13.5)
POTASSIUM SERPL-SCNC: 3.6 MMOL/L (ref 3.7–5.3)
RBC # BLD: 3.24 M/UL (ref 3.95–5.11)
SODIUM BLD-SCNC: 139 MMOL/L (ref 135–144)
TOTAL PROTEIN: 7.4 G/DL (ref 6.4–8.3)
WBC # BLD: 7.9 K/UL (ref 3.5–11.3)

## 2022-12-27 PROCEDURE — 85027 COMPLETE CBC AUTOMATED: CPT

## 2022-12-27 PROCEDURE — 80053 COMPREHEN METABOLIC PANEL: CPT

## 2022-12-27 PROCEDURE — 36415 COLL VENOUS BLD VENIPUNCTURE: CPT

## 2022-12-27 PROCEDURE — P9603 ONE-WAY ALLOW PRORATED MILES: HCPCS

## 2022-12-28 ENCOUNTER — OFFICE VISIT (OUTPATIENT)
Dept: ORTHOPEDIC SURGERY | Age: 62
End: 2022-12-28

## 2022-12-28 ENCOUNTER — CLINICAL DOCUMENTATION (OUTPATIENT)
Dept: SPIRITUAL SERVICES | Age: 62
End: 2022-12-28

## 2022-12-28 VITALS — BODY MASS INDEX: 53.92 KG/M2 | HEIGHT: 62 IN | WEIGHT: 293 LBS

## 2022-12-28 DIAGNOSIS — S82.002D CLOSED DISPLACED FRACTURE OF LEFT PATELLA WITH ROUTINE HEALING, UNSPECIFIED FRACTURE MORPHOLOGY, SUBSEQUENT ENCOUNTER: Primary | ICD-10-CM

## 2022-12-28 PROCEDURE — 99024 POSTOP FOLLOW-UP VISIT: CPT | Performed by: NURSE PRACTITIONER

## 2022-12-28 NOTE — ACP (ADVANCE CARE PLANNING)
Advance Care Planning   Ambulatory ACP Specialist Patient Outreach    Date:  12/28/2022  ACP Specialist:  Severa Catalina    Outreach call to patient in follow-up to ACP Specialist referral from: DEMARCUS Yost CNP    [x] PCP  [] Provider   [] Ambulatory Care Management [] Other for Reason:    [x] Advance Directive Assistance  [] Code Status Discussion  [] Complete Portable DNR Order  [] Discuss Goals of Care  [] Complete POST/MOST  [] Early ACP Decision-Making  [] Other    Date Referral Received: 10/20/22    Today's Outreach:  [] First   [x] Second  [] Third                               Second outreach made by [x]  phone  [x] email [x]   Lecere     Intervention:  [] Spoke with Patient  [x] Left VM requesting return call      Outcome: Left detailed VM for Patient to return call. Sent Email w/ACP Packet attached. Revo Round. Next Step:   [] ACP scheduled conversation  [x] Outreach again in one week               [x] Email / Mail ACP Info Sheets  [x] Email / Mail Advance Directive            [] Close Referral. Routing closure to referring provider/staff and to ACP Specialist . [] Closure Letter mailed to Patient with Invitation to Contact ACP Specialist if/when ready.     Thank you for this referral.

## 2022-12-28 NOTE — PROGRESS NOTES
[facility]  [address] [facility] [address] [facility]  Dept Phone: [phone]  Dept Fax: [phone]      Orthopaedic Trauma Clinic Follow Up      Subjective:   Date of Surgery: 12/12/2022    Torsten Humphreys is a 60y.o. year old female who presents to the clinic today for routine follow up 16 days post operatively from open treatment of a left patella fracture with repair of the left knee retinaculum. Patient currently resides in a nursing facility but states she is being discharged home today. Patient states she has been ambulating daily with the use of a walker while in the knee immobilizer at all times. States she has been working with physical therapy performing exercises such as gait training and balance as well as upper body strength. States she has been compliant with remaining in the knee immobilizer and keeping the knee in extension at all times. States the nursing staff has been washing her incision and applying new dressings daily. States they applied a wound VAC of some kind to her incision 2 days ago and it was removed today prior to her appointment. States she thinks it was applied due to the amount of drainage and swelling in her knee. States the pain is well controlled with the use of tramadol and states she received a tramadol prior to her appointment today. Denies any numbness or tingling. Denies any new injuries or falls. Review of Systems  Gen: no fever, chills, malaise  CV: no chest pain or palpitations  Resp: no cough or shortness of breath  GI: no nausea, vomiting, diarrhea, or constipation  Neuro: no seizures, vertigo, or headache  Msk: left knee swelling   10 remaining systems reviewed and negative    Objective : There were no vitals filed for this visit. Body mass index is 53.59 kg/m². General: No acute distress, resting comfortably in the clinic  Neuro: alert.  oriented  Eyes: Extra-ocular muscles intact  Pulm: Respirations unlabored and regular. Skin: warm, well perfused  Psych:   Patient has good fund of knowledge and displays understanding of exam, diagnosis, and plan. LLE: Skin intact. Swelling about the knee. Surgical incision well approximated and healing without evidence of dehiscence. There is scant serous drainage from the distal aspect of her incision likely related to interstitial fluid. Blister lateral to her incision healing appropriately without evidence of infection or drainage. Compartments soft. 2+ DP pulse. Deep and Superficial Peroneal/Saphenous/Sural SILT. Radiology:  No radiographs obtained today. Assessment:   60y.o. year old female with open treatment of left patella fracture with repair of left knee retinaculum; DOS: 12/12/2022. Plan:   - Staples removed, steri strips applied. Clean incisions and/or wounds with soap and water daily then apply clean dressings until wounds are healed and dry. If steri strips are in place, leave in place until they naturally fall off. Avoid soaks of any kind (bath, hot tub, pool, lake, etc.).     - Continue weightbearing as tolerated to the LLE while in the knee immobilizer. Keep the knee immobilizer on at all times except for removal for hygiene. No active or passive range of motion of the knee until further notice. Note provided to patient to give to home care today. - F/u in 4 weeks with x-rays. Follow up:Return in about 4 weeks (around 1/25/2023) for x-rays out of cast/splint/brace. No orders of the defined types were placed in this encounter. No orders of the defined types were placed in this encounter.       Electronically signed by DEMARCUS Toro CNP on 12/28/2022 at 1:27 PM    This note is created with the assistance of a speech recognition program.  While intending to generate a document that actually reflects the content of the visit, the document can still have some errors including those of syntax and sound a like substitutions which may escape proof reading.   In such instances, actual meaning can be extrapolated by contextual diversion

## 2023-01-04 ENCOUNTER — TELEPHONE (OUTPATIENT)
Dept: PRIMARY CARE CLINIC | Age: 63
End: 2023-01-04

## 2023-01-04 NOTE — TELEPHONE ENCOUNTER
Contacted patient to let her know that I do not have the forms yet, she informed that sister would be dropping them off 1/5/23

## 2023-01-04 NOTE — TELEPHONE ENCOUNTER
----- Message from Laura Berrios sent at 1/3/2023  2:51 PM EST -----  Subject: Message to Provider    QUESTIONS  Information for Provider? pt called because her sister, Ze Zambrano, will   be dropping off paperwork for Tarps for pt. This paperwork needs to   completed by pcp for transportation for pt. please contact pt when this is   ready for  and she will have her sister to come pick it up.  ---------------------------------------------------------------------------  --------------  [address]  [phone]; OK to leave message on voicemail  ---------------------------------------------------------------------------  --------------  SCRIPT ANSWERS  Relationship to Patient?  Self

## 2023-01-09 ENCOUNTER — TELEPHONE (OUTPATIENT)
Dept: PRIMARY CARE CLINIC | Age: 63
End: 2023-01-09

## 2023-01-09 NOTE — TELEPHONE ENCOUNTER
Called patient to let her know [employer] form completed, she requested it be faxed. Writer will fax. Nurse from [address] then got on phone and asked if PCP could order Alginate for incision from her knee surgery. She states she also has call out to surgeon, provider was standing her so checked with her and she suggests she wait for surgeon to order.

## 2023-01-11 NOTE — TELEPHONE ENCOUNTER
Dilip Griggs from [place] called stating they didn't receive all the forms from pt's application. States they only received all the even forms and not the odds. Writer informed Dilip Griggs that forms will be refaxed double sided.  Dilip Griggs verbalized understanding    Spoke with [place], and confirmed that they will refax forms

## 2023-01-17 ENCOUNTER — TELEPHONE (OUTPATIENT)
Dept: ORTHOPEDIC SURGERY | Age: 63
End: 2023-01-17

## 2023-01-17 NOTE — TELEPHONE ENCOUNTER
Rosalee called in with [facility] informing us the distal end of pts incision is blistering she thinks its due to the amount of drainage the pt is still experiencing. She is requesting orders for Silver Alginate dressings. Please advise.     DOS- 12/12/22 Open treatment of left patella fx

## 2023-01-17 NOTE — TELEPHONE ENCOUNTER
Left message on RNs' confidential voicemail informing her we would like to see her in clinic prior to any additional dressing orders. Provided office call back information for any further questions or concerns.

## 2023-01-17 NOTE — TELEPHONE ENCOUNTER
Patient states her nurse, [address] [place] [address], from [address] would like to speak to the office regarding her wound care. Please contact [address] [place] [address] @ [phone]. Thank you.

## 2023-02-02 ENCOUNTER — OFFICE VISIT (OUTPATIENT)
Dept: ORTHOPEDIC SURGERY | Age: 63
End: 2023-02-02

## 2023-02-02 VITALS — HEIGHT: 62 IN | WEIGHT: 293 LBS | BODY MASS INDEX: 53.92 KG/M2

## 2023-02-02 DIAGNOSIS — S82.002D CLOSED DISPLACED FRACTURE OF LEFT PATELLA WITH ROUTINE HEALING, UNSPECIFIED FRACTURE MORPHOLOGY, SUBSEQUENT ENCOUNTER: Primary | ICD-10-CM

## 2023-02-02 NOTE — PROGRESS NOTES
[facility]  [address] [facility] [address] [facility]  Dept Phone: [phone]  Dept Fax: [phone]      Orthopaedic Trauma Clinic Follow Up      Subjective:   Date of Surgery: 12/12/2022    Shayy Ruiz is a 60y.o. year old female who presents to the clinic today for routine follow up 7 weeks post operatively from open treatment of a left patella fracture with repair of the left knee retinaculum. Patient states she currently resides with her sister because it is a one-story home. She is receiving home physical therapy twice a week as well as a visiting nurse that comes 1-2 times per week. States she has been able to ambulate short distances around the house with the use of a walker and states she is able to put some weight on the left leg when standing but does not put any weight on it with walking. Patient states she has been in the knee immobilizer at all times and has kept the knee in full extension. States they are only taking off the immobilizer to change the dressing over her incision. States she is not washing it with soap and water but her sister sprays a wound spray over the incision then applies an ABD and changes it once daily. States the drainage has improved and is very minimal.  States her pain is intermittent but typically occurs at night, describing it more like a muscle type pain in her quad. She states she is taking extra strength Tylenol for pain and when that does not help she will take a tramadol which she was on at baseline for her arthritis. Denies any new injuries or falls. States she has numbness along the incision but denies any other numbness or tingling.     Review of Systems  Gen: no fever, chills, malaise  CV: no chest pain or palpitations  Resp: no cough or shortness of breath  GI: no nausea, vomiting, diarrhea, or constipation  Neuro: no seizures, vertigo, or headache  Msk: Left knee pain  10 remaining systems reviewed and negative    Objective :   There were no vitals filed for this visit. Body mass index is 53.59 kg/m². General: No acute distress, resting comfortably in the clinic  Neuro: alert. oriented  Eyes: Extra-ocular muscles intact  Pulm: Respirations unlabored and regular. Skin: warm, well perfused  Psych:   Patient has good fund of knowledge and displays understanding of exam, diagnosis, and plan. LLE:  Skin intact. Surgical incision approximated and healing without evidence of infection. Two small areas within the incision with scant serous/tan drainage and evidence of scabbing. No ecchymosis, erythema or significant swelling. No TTP about the knee. Able to actively fully extend leg and able to  minimally demonstrate straight leg raise by lifting the leg from the floor to her wheelchair leg extender. Compartments soft. 2+ DP pulse. TA/EHL/FHL/GS motor intact. Deep and Superficial Peroneal/Saphenous/Sural SILT. Radiology:  Imaging studies from today were independently reviewed and read as listed below. Any relevant images obtained prior to today's visit were also independently interpreted. History: Open treatment of left superior pole patella fracture with suture anchor     Comparison: 12/12/2022    Findings: 3 views of the left knee (AP, oblique, lateral) in a skeletally mature patient redemonstrating severe tricompartmental osteophyte spurring and severe narrowing and remodeling of the medial compartment. Age-indeterminate bony resorption of the upper pole of the patella, unchanged from previous imaging. No new fractures or dislocations identified. No change in overall alignment of the patella. Impression: Severe left knee tricompartmental osteophyte spurring with  severe narrowing and remodeling of the medial compartment. Age-indeterminate bony resorption of the upper pole of the patella, unchanged from previous imaging.       Assessment:   60y.o. year old female with  open treatment of left patella fracture with repair of left knee retinaculum; DOS: 12/12/2022. Plan:   - Reviewed x-rays with the patient. - At this time, patient may progress to weightbearing as tolerated while in the knee immobilizer. Only needs to wear the immobilizer with ambulation otherwise she can remove it. Instructed to work with PT for knee ROM, quad strength, balance, and gait training. Once these things improve, okay to discontinue the immobilizer all together.      - Instructed to wash incision daily with soap and water then apply clean bandages to the areas that are draining. If no longer draining, leave open to air. Avoid soaks of any kind. Follow up:Return in about 6 weeks (around 3/16/2023) for x-rays. No orders of the defined types were placed in this encounter. Orders Placed This Encounter   Procedures    XR KNEE LEFT (3 VIEWS)     Standing Status:   Future     Number of Occurrences:   1     Standing Expiration Date:   1/27/2024       Electronically signed by DEMARCUS Marshall CNP on 2/2/2023 at 12:15 PM    This note is created with the assistance of a speech recognition program.  While intending to generate a document that actually reflects the content of the visit, the document can still have some errors including those of syntax and sound a like substitutions which may escape proof reading.   In such instances, actual meaning can be extrapolated by contextual diversion

## 2023-02-02 NOTE — LETTER
[facility]  [facility] [address] [place] [address]  Phone: [phone]  Fax: [phone]    Erinn Nash DO        February 2, 2023     Patient: Sharona Norwood   YOB: 1960   Date of Visit: 2/2/2023       To Whom it May Concern:    Napoleon Linton was seen in my clinic on 2/2/2023. Physical therapy: Patient may progress to weightbearing as tolerated in the knee immobilizer. Okay to remove the immobilizer while at rest and when sleeping. Please work on knee range of motion, quadriceps strength, gait training and balance. Once quad strength has improved, okay to begin weaning out of the brace when weightbearing when ready. Wound care: Please wash over incision daily with soap and water. Avoid soaks of any kind (bath tub, hot tub, pool). Apply clean bandaids over areas that are draining and change daily. Once no longer draining, okay to leave open to air. If you have any questions or concerns, please don't hesitate to call.     Sincerely,           Erinn Nash DO

## 2023-02-03 ENCOUNTER — TELEPHONE (OUTPATIENT)
Dept: PRIMARY CARE CLINIC | Age: 63
End: 2023-02-03

## 2023-02-10 ENCOUNTER — CLINICAL DOCUMENTATION (OUTPATIENT)
Dept: SPIRITUAL SERVICES | Age: 63
End: 2023-02-10

## 2023-02-10 NOTE — ACP (ADVANCE CARE PLANNING)
Advance Care Planning   Ambulatory ACP Specialist Patient Outreach    Date:  2/10/2023  ACP Specialist:  Isa Ball    Outreach call to patient in follow-up to ACP Specialist referral from: DEMARCUS Orourke CNP    [x] PCP  [] Provider   [] Ambulatory Care Management [] Other for Reason:    [x] Advance Directive Assistance  [] Code Status Discussion  [] Complete Portable DNR Order  [] Discuss Goals of Care  [] Complete POST/MOST  [] Early ACP Decision-Making  [] Other    Date Referral Received:10/20/22    Today's Outreach:  [] First   [] Second  [x] Third                               Third outreach made by [x]  phone  [] email []   [place]     Intervention:  [x] Spoke with Patient  [] Left VM requesting return call      Outcome: Spoke w/Patient confirming [address] listed on Advance Directive uploaded 12/16/22. Patient confirmed the [address] listed match current wishes. Health Care Decision Makers have been updated to reflect Advance Directive on file. Referral can be Closed. Advance Care Planning   Healthcare Decision Maker:    Primary Decision Maker: Moustapha Marques - Brother/Sister - [phone]    Secondary Decision Maker: Matias Lawler - Brother/Sister - [phone]    Supplemental (Other) Decision Maker: Khurram Lowe - Brother/Sister - [phone]    Supplemental (Other) Decision Maker: Viviana Marques - Brother/Sister - [phone]    Click here to complete Healthcare Decision Makers including selection of the Healthcare Decision Maker Relationship (ie \"Primary\"). Next Step:   [] ACP scheduled conversation  [] Outreach again in one week               [] Email / Mail ACP Info Sheets  [] Email / Mail Advance Directive            [x] Close Referral. Routing closure to referring provider/staff and to ACP Specialist . [] Closure Letter mailed to Patient with Invitation to Contact ACP Specialist if/when ready.     Thank you for this referral.

## 2023-02-17 ENCOUNTER — TELEPHONE (OUTPATIENT)
Dept: PRIMARY CARE CLINIC | Age: 63
End: 2023-02-17

## 2023-02-17 NOTE — TELEPHONE ENCOUNTER
Received fax from [address] [place] [address] in regards to medical  evaluation. Cover page states that there is a total of 3 pages, writer only received two. Unable to reach a live person to verify if this was an error.  Had to [facility] for a call back

## 2023-02-27 ENCOUNTER — TELEPHONE (OUTPATIENT)
Dept: ORTHOPEDIC SURGERY | Age: 63
End: 2023-02-27

## 2023-02-27 NOTE — TELEPHONE ENCOUNTER
Patient called  and left message on voicemail asking for return call. Called patient back and she stated that she had already gotten information she needed.

## 2023-03-06 ENCOUNTER — TELEPHONE (OUTPATIENT)
Dept: ORTHOPEDIC SURGERY | Age: 63
End: 2023-03-06

## 2023-03-13 DIAGNOSIS — I10 ESSENTIAL HYPERTENSION: ICD-10-CM

## 2023-03-14 RX ORDER — AMLODIPINE BESYLATE 10 MG/1
TABLET ORAL
Qty: 90 TABLET | Refills: 3 | Status: SHIPPED | OUTPATIENT
Start: 2023-03-14

## 2023-03-16 ENCOUNTER — OFFICE VISIT (OUTPATIENT)
Dept: ORTHOPEDIC SURGERY | Age: 63
End: 2023-03-16

## 2023-03-16 VITALS — BODY MASS INDEX: 53.92 KG/M2 | HEIGHT: 62 IN | WEIGHT: 293 LBS

## 2023-03-16 DIAGNOSIS — S82.002D CLOSED DISPLACED FRACTURE OF LEFT PATELLA WITH ROUTINE HEALING, UNSPECIFIED FRACTURE MORPHOLOGY, SUBSEQUENT ENCOUNTER: Primary | ICD-10-CM

## 2023-04-18 ENCOUNTER — TELEPHONE (OUTPATIENT)
Dept: ORTHOPEDIC SURGERY | Age: 63
End: 2023-04-18

## 2023-04-18 NOTE — TELEPHONE ENCOUNTER
Pt called in inquiring if we can get her an order for a motorized scooter. She stated her leg occasionally gives out and she would like to have it during those times to get around. Please advise.

## 2023-04-20 ENCOUNTER — TELEPHONE (OUTPATIENT)
Dept: PRIMARY CARE CLINIC | Age: 63
End: 2023-04-20

## 2023-04-20 NOTE — TELEPHONE ENCOUNTER
Called and notified pt, she verbalized understanding and requested we inform her PCP. Called PCP office and notified them as well.

## 2023-04-20 NOTE — TELEPHONE ENCOUNTER
Jared Bisi from [address] called, patient is requesting a motorized scooter and since this is a long term DME PCP has to prescribe. Writer called patient to schedule appt for face to face but she cannot get to office due to having no body to push her wheelchair. Her niece injured shoulder, sisters are older than her, and kids work. She states she needs this due to knees, left knee she fractured and had surgery and right knee has arthritis. She would like to go to aquatics for knees but cannot get around with wheel chair to do that, wants to be more independent. Patient is asking if face to face can be done by a virtual visit. Please advise.

## 2023-04-25 ENCOUNTER — TELEMEDICINE (OUTPATIENT)
Dept: PRIMARY CARE CLINIC | Age: 63
End: 2023-04-25
Payer: MEDICARE

## 2023-04-25 DIAGNOSIS — M19.042 PRIMARY OSTEOARTHRITIS OF BOTH HANDS: ICD-10-CM

## 2023-04-25 DIAGNOSIS — M17.0 PRIMARY OSTEOARTHRITIS OF BOTH KNEES: ICD-10-CM

## 2023-04-25 DIAGNOSIS — R26.89 DECREASED MOBILITY: ICD-10-CM

## 2023-04-25 DIAGNOSIS — Z74.1 REQUIRES ASSISTANCE WITH ACTIVITIES OF DAILY LIVING (ADL): ICD-10-CM

## 2023-04-25 DIAGNOSIS — M19.041 PRIMARY OSTEOARTHRITIS OF BOTH HANDS: ICD-10-CM

## 2023-04-25 DIAGNOSIS — S82.002A CLOSED DISPLACED FRACTURE OF LEFT PATELLA, UNSPECIFIED FRACTURE MORPHOLOGY, INITIAL ENCOUNTER: Primary | ICD-10-CM

## 2023-04-25 PROCEDURE — G8427 DOCREV CUR MEDS BY ELIG CLIN: HCPCS | Performed by: NURSE PRACTITIONER

## 2023-04-25 PROCEDURE — 99214 OFFICE O/P EST MOD 30 MIN: CPT | Performed by: NURSE PRACTITIONER

## 2023-04-25 PROCEDURE — 3017F COLORECTAL CA SCREEN DOC REV: CPT | Performed by: NURSE PRACTITIONER

## 2023-04-25 RX ORDER — TRAMADOL HYDROCHLORIDE 50 MG/1
50 TABLET ORAL 3 TIMES DAILY PRN
Qty: 90 TABLET | Refills: 0 | Status: SHIPPED | OUTPATIENT
Start: 2023-04-25 | End: 2023-05-25

## 2023-04-25 SDOH — ECONOMIC STABILITY: INCOME INSECURITY: HOW HARD IS IT FOR YOU TO PAY FOR THE VERY BASICS LIKE FOOD, HOUSING, MEDICAL CARE, AND HEATING?: SOMEWHAT HARD

## 2023-04-25 SDOH — ECONOMIC STABILITY: FOOD INSECURITY: WITHIN THE PAST 12 MONTHS, THE FOOD YOU BOUGHT JUST DIDN'T LAST AND YOU DIDN'T HAVE MONEY TO GET MORE.: NEVER TRUE

## 2023-04-25 SDOH — ECONOMIC STABILITY: FOOD INSECURITY: WITHIN THE PAST 12 MONTHS, YOU WORRIED THAT YOUR FOOD WOULD RUN OUT BEFORE YOU GOT MONEY TO BUY MORE.: NEVER TRUE

## 2023-04-25 SDOH — ECONOMIC STABILITY: HOUSING INSECURITY
IN THE LAST 12 MONTHS, WAS THERE A TIME WHEN YOU DID NOT HAVE A STEADY PLACE TO SLEEP OR SLEPT IN A SHELTER (INCLUDING NOW)?: NO

## 2023-04-25 ASSESSMENT — ENCOUNTER SYMPTOMS
COUGH: 0
NAUSEA: 0
TROUBLE SWALLOWING: 0
CONSTIPATION: 0
DIARRHEA: 0
SHORTNESS OF BREATH: 0
SORE THROAT: 0
VOMITING: 0
BLOOD IN STOOL: 0
ABDOMINAL PAIN: 0
WHEEZING: 0
SINUS PRESSURE: 0

## 2023-04-25 ASSESSMENT — PATIENT HEALTH QUESTIONNAIRE - PHQ9
SUM OF ALL RESPONSES TO PHQ QUESTIONS 1-9: 1
1. LITTLE INTEREST OR PLEASURE IN DOING THINGS: 0
2. FEELING DOWN, DEPRESSED OR HOPELESS: 1
SUM OF ALL RESPONSES TO PHQ QUESTIONS 1-9: 1
SUM OF ALL RESPONSES TO PHQ9 QUESTIONS 1 & 2: 1

## 2023-04-25 NOTE — PROGRESS NOTES
[address] PRIMARY CARE  [address] [facility] [address]  [address] [address]  Dept: [phone]  Dept Fax: [phone]    Nedra Cobian is a 60 y.o. female who presentstoday for her medical conditions/complaints as noted below. Nedra Cobian is c/o of  Chief Complaint   Patient presents with    Other     F2F for scooter. HPI:     Presents today via video virtual visit for face-to-face appointment for motorized wheelchair  Had a fall December 10, 2022, requiring admission to hospital and surgery due to fractured patella with ruptured tendons  Since that time she has been unable to maintain her independence. Spent several weeks in rehab facility and then was discharged to her sister's home as it is on 1 level. She has not been able to return to her own home yet. She is receiving in-home physical therapy services at this time because she is unable to leave the house. Has difficulty with propelling her standard wheelchair and relies on others to push  She can transfer herself with some difficulty but is able to do so  She requires assistance putting on her underclothing, pants as well as shoes and socks due to the knee pain and decreased mobility in her left leg  She states she feels she would be able to manage a toggle device to propel a motorized wheelchair independently  Has explored transportation options and is aware can use tarps as they will accommodate a motorized wheelchair.   Requiring a motorized wheelchair will allow her increased independence and she will be able to attend in office medical appointments and also graduate to outpatient physical therapy      No results found for: LABA1C          ( goal A1C is < 7)   No results found for: LABMICR  LDL Cholesterol (mg/dL)   Date Value   10/20/2022 145 (H)   2021 176 (H)   2016 72       (goal LDL is <100)   AST (U/L)   Date Value   2022 15     ALT (U/L)   Date Value   2022 19     BUN

## 2023-05-12 ENCOUNTER — TELEPHONE (OUTPATIENT)
Dept: PRIMARY CARE CLINIC | Age: 63
End: 2023-05-12

## 2023-05-18 ENCOUNTER — OFFICE VISIT (OUTPATIENT)
Dept: ORTHOPEDIC SURGERY | Age: 63
End: 2023-05-18

## 2023-05-18 VITALS — WEIGHT: 293 LBS | HEIGHT: 62 IN | BODY MASS INDEX: 53.92 KG/M2

## 2023-05-18 DIAGNOSIS — S82.002D CLOSED DISPLACED FRACTURE OF LEFT PATELLA WITH ROUTINE HEALING, UNSPECIFIED FRACTURE MORPHOLOGY, SUBSEQUENT ENCOUNTER: Primary | ICD-10-CM

## 2023-05-18 NOTE — PROGRESS NOTES
[facility]  [address] [facility] [address] [facility]  Dept Phone: [phone]  Dept Fax: [phone]      Orthopaedic Trauma Clinic Follow Up      Subjective:   Date of Surgery: 12/12/2022    Delilah Carl is a 60y.o. year old female who presents to the clinic today for routine follow up 5 months after ORIF of left patella fracture. Patient continues to live with her sister and receiving home physical therapy. Has plans to obtain motorized wheelchair and install railings at her home. Obtained a quote or a walk-in shower however the estimated cost was approximately $17,000 which she cannot afford and is exploring other options. States she has plans in place to transition to outpatient therapy soon. No falls or other complications. Feels she is still making progress with physical therapy. Unable to perform stairs at this point. Review of Systems  Gen: no fever, chills, malaise  CV: no chest pain or palpitations  Resp: no cough or shortness of breath  GI: no nausea, vomiting, diarrhea, or constipation  Neuro: no seizures, vertigo, or headache  Msk: Left leg weakness and stiffness in the knee  10 remaining systems reviewed and negative    Objective : There were no vitals filed for this visit. Body mass index is 53.59 kg/m². General: No acute distress, resting comfortably in the clinic  Neuro: alert. oriented  Eyes: Extra-ocular muscles intact  Pulm: Respirations unlabored and regular. Skin: warm, well perfused  Psych:   Patient has good fund of knowledge and displays understanding of exam, diagnosis, and plan. MSK: LLE: Surgical incision well-healed without sign of infection or other complication. Knee range of motion 0-70 degrees. No motor or sensory deficit. Morbid obesity.   Able to perform straight leg raise     Radiology:  No studies obtained today     Assessment:   60y.o. year old female with left patella fracture status post ORIF  Plan:   Patient is now focused on her

## 2023-05-31 NOTE — PROGRESS NOTES
Problem: At Risk for Falls  Goal: # Patient does not fall  Outcome: Outcome Met, Continue evaluating goal progress toward completion  Note: Bed and chair alarms on. No impulsive behavior noted. Call light and patient belongings within reach. Hourly rounding done by RN and NA.      Problem: Pressure Injury, Risk for  Goal: # Skin remains intact  Outcome: Outcome Met, Continue evaluating goal progress toward completion  Note: Patient encouraged and assisted to reposition every 2 hours. Skin remains intact.        After obtaining consent, and per orders of Pelon Lord, injection of Flu Vaccine given in Left deltoid by Dinorah Kruger. Patient instructed to remain in clinic for 20 minutes afterwards, and to report any adverse reaction to me immediately.

## 2023-06-22 ENCOUNTER — TELEPHONE (OUTPATIENT)
Dept: PRIMARY CARE CLINIC | Age: 63
End: 2023-06-22

## 2023-06-22 DIAGNOSIS — Z74.1 REQUIRES ASSISTANCE WITH ACTIVITIES OF DAILY LIVING (ADL): ICD-10-CM

## 2023-06-22 DIAGNOSIS — R26.89 DECREASED MOBILITY: ICD-10-CM

## 2023-06-22 DIAGNOSIS — S82.002A CLOSED DISPLACED FRACTURE OF LEFT PATELLA, UNSPECIFIED FRACTURE MORPHOLOGY, INITIAL ENCOUNTER: Primary | ICD-10-CM

## 2023-06-22 NOTE — TELEPHONE ENCOUNTER
Pt called and asked if she could get a referral made for [address] for outpatient Physical therapy, (make sure its outpatient and not in the clinic).  Pt said its from her previous left knee fracture, please advise      Fax : [phone]

## 2023-07-06 ENCOUNTER — TELEMEDICINE (OUTPATIENT)
Dept: PRIMARY CARE CLINIC | Age: 63
End: 2023-07-06
Payer: MEDICARE

## 2023-07-06 DIAGNOSIS — S82.002S CLOSED DISPLACED FRACTURE OF LEFT PATELLA, UNSPECIFIED FRACTURE MORPHOLOGY, SEQUELA: ICD-10-CM

## 2023-07-06 DIAGNOSIS — R26.89 DECREASED MOBILITY: Primary | ICD-10-CM

## 2023-07-06 DIAGNOSIS — M17.0 PRIMARY OSTEOARTHRITIS OF BOTH KNEES: ICD-10-CM

## 2023-07-06 DIAGNOSIS — Z74.1 REQUIRES ASSISTANCE WITH ACTIVITIES OF DAILY LIVING (ADL): ICD-10-CM

## 2023-07-06 PROCEDURE — G8427 DOCREV CUR MEDS BY ELIG CLIN: HCPCS | Performed by: NURSE PRACTITIONER

## 2023-07-06 PROCEDURE — 99213 OFFICE O/P EST LOW 20 MIN: CPT | Performed by: NURSE PRACTITIONER

## 2023-07-06 PROCEDURE — 3017F COLORECTAL CA SCREEN DOC REV: CPT | Performed by: NURSE PRACTITIONER

## 2023-07-06 SDOH — ECONOMIC STABILITY: FOOD INSECURITY: WITHIN THE PAST 12 MONTHS, THE FOOD YOU BOUGHT JUST DIDN'T LAST AND YOU DIDN'T HAVE MONEY TO GET MORE.: NEVER TRUE

## 2023-07-06 SDOH — ECONOMIC STABILITY: FOOD INSECURITY: WITHIN THE PAST 12 MONTHS, YOU WORRIED THAT YOUR FOOD WOULD RUN OUT BEFORE YOU GOT MONEY TO BUY MORE.: NEVER TRUE

## 2023-07-06 SDOH — ECONOMIC STABILITY: INCOME INSECURITY: HOW HARD IS IT FOR YOU TO PAY FOR THE VERY BASICS LIKE FOOD, HOUSING, MEDICAL CARE, AND HEATING?: NOT HARD AT ALL

## 2023-07-06 ASSESSMENT — PATIENT HEALTH QUESTIONNAIRE - PHQ9
2. FEELING DOWN, DEPRESSED OR HOPELESS: 0
SUM OF ALL RESPONSES TO PHQ9 QUESTIONS 1 & 2: 0
SUM OF ALL RESPONSES TO PHQ QUESTIONS 1-9: 0
1. LITTLE INTEREST OR PLEASURE IN DOING THINGS: 0
SUM OF ALL RESPONSES TO PHQ QUESTIONS 1-9: 0

## 2023-07-06 ASSESSMENT — ENCOUNTER SYMPTOMS
CONSTIPATION: 0
SHORTNESS OF BREATH: 0
DIARRHEA: 0
NAUSEA: 0
ABDOMINAL PAIN: 0
WHEEZING: 0
VOMITING: 0
TROUBLE SWALLOWING: 0
BLOOD IN STOOL: 0
SORE THROAT: 0
SINUS PRESSURE: 0
COUGH: 0

## 2023-07-06 NOTE — PROGRESS NOTES
[address]  PRIMARY CARE  [address] [address]  Dept: [phone]  Dept Fax: [phone]    Wally Gomez is a 60 y.o. female who presentstoday for her medical conditions/complaints as noted below.   Wally Gomez is c/o of  Chief Complaint   Patient presents with    Referral - General     PT referral            HPI:     Presents today via video virtual visit to discuss outpatient physical therapy needs  Has completed home PT program and would like to progress to outpatient  She is still living with her sister but is eager to get back to her own home  However, her home has steps and she is still having difficulty with her mobility  She feels the outpatient therapy setting will be able to provide her training/strengthening so that she can resume climbing stairs  Once she is able to do that she plans to move back to her home where she was living independently prior to breaking her knee  She is working with the [facility] office on aging to have some modifications made to her home for railings on steps      No results found for: LABA1C          ( goal A1C is < 7)   No results found for: LABMICR  LDL Cholesterol (mg/dL)   Date Value   10/20/2022 145 (H)   09/09/2021 176 (H)   06/06/2016 72       (goal LDL is <100)   AST (U/L)   Date Value   12/27/2022 15     ALT (U/L)   Date Value   12/27/2022 19     BUN (mg/dL)   Date Value   12/27/2022 19     BP Readings from Last 3 Encounters:   12/14/22 127/62   10/20/22 130/70   09/09/21 124/88          ([id number])    Past Medical History:   Diagnosis Date    CVA (cerebral vascular accident) ([address])     Hypertension     Osteoarthritis     knee    Syncope       Past Surgical History:   Procedure Laterality Date    ANKLE FRACTURE SURGERY Left 12/12/2022    QUAD TENDON REPAIR WITH TENDON AVULSION, ARTHREX, C-ARM performed by Amarilys Mendez DO at [address] Left 12/12/2022    QUAD TENDON REPAIR WITH TENDON

## 2023-07-11 RX ORDER — MELOXICAM 15 MG/1
TABLET ORAL
Qty: 90 TABLET | Refills: 3 | Status: SHIPPED | OUTPATIENT
Start: 2023-07-11

## 2023-07-13 ENCOUNTER — TELEPHONE (OUTPATIENT)
Dept: PRIMARY CARE CLINIC | Age: 63
End: 2023-07-13

## 2023-07-13 RX ORDER — CLOPIDOGREL BISULFATE 75 MG/1
75 TABLET ORAL DAILY
Qty: 90 TABLET | Refills: 1 | Status: SHIPPED | OUTPATIENT
Start: 2023-07-13

## 2023-07-13 NOTE — TELEPHONE ENCOUNTER
Patient called stating that her insurance will no longer cover her aggrenox and it is $700 without insurance coverage.  She is asking for an alternative to be sent in

## 2023-07-19 ENCOUNTER — TELEPHONE (OUTPATIENT)
Dept: PRIMARY CARE CLINIC | Age: 63
End: 2023-07-19

## 2023-07-19 NOTE — TELEPHONE ENCOUNTER
Patients Aspirin-Dipyridamole ER  MG oral caps are no longer covered. They are asking for a alternative.  (They did not give any alternatives on the fax)

## 2023-08-04 ENCOUNTER — TELEPHONE (OUTPATIENT)
Dept: PRIMARY CARE CLINIC | Age: 63
End: 2023-08-04

## 2023-08-04 NOTE — TELEPHONE ENCOUNTER
Patient has not completed mammogram. Will send letter and [employer] message    Last Visit Date: 7/6/2023   Next Visit Date: 10/26/2023

## 2023-08-21 DIAGNOSIS — I10 ESSENTIAL HYPERTENSION: ICD-10-CM

## 2023-08-21 RX ORDER — HYDROCHLOROTHIAZIDE 25 MG/1
TABLET ORAL
Qty: 90 TABLET | Refills: 3 | Status: SHIPPED | OUTPATIENT
Start: 2023-08-21

## 2023-09-19 ENCOUNTER — TELEPHONE (OUTPATIENT)
Dept: ORTHOPEDIC SURGERY | Age: 63
End: 2023-09-19

## 2023-09-19 NOTE — TELEPHONE ENCOUNTER
Called patient regarding shoe insert, stated that she was going to find out which one she needs and have it faxed to office.

## 2023-09-19 NOTE — TELEPHONE ENCOUNTER
Pt called in has been seeing Dr. Anibal Munoz and has been doing PT and states the therapist told her that she needs an insert for her shoe on the lt side to help with her walking and going up and down stairs, says that her foot is off balance.         Please call pt with any questions @ [phone]

## 2023-09-20 DIAGNOSIS — I10 ESSENTIAL HYPERTENSION: ICD-10-CM

## 2023-09-20 RX ORDER — HYDRALAZINE HYDROCHLORIDE 25 MG/1
TABLET, FILM COATED ORAL
Qty: 180 TABLET | Refills: 3 | Status: SHIPPED | OUTPATIENT
Start: 2023-09-20

## 2023-09-21 ENCOUNTER — TELEPHONE (OUTPATIENT)
Dept: ORTHOPEDIC SURGERY | Age: 63
End: 2023-09-21

## 2023-09-21 NOTE — TELEPHONE ENCOUNTER
Cass Helms with [facility] called in noting a 4cm discrepancy in pts leg length exam with the left side being shorter.

## 2023-11-24 DIAGNOSIS — J30.9 CHRONIC ALLERGIC RHINITIS: ICD-10-CM

## 2023-11-24 RX ORDER — LORATADINE 10 MG/1
TABLET ORAL
Qty: 90 TABLET | Refills: 3 | Status: SHIPPED | OUTPATIENT
Start: 2023-11-24

## 2023-11-30 ENCOUNTER — HOSPITAL ENCOUNTER (OUTPATIENT)
Age: 63
Setting detail: SPECIMEN
Discharge: HOME | End: 2023-11-30

## 2023-11-30 ENCOUNTER — OFFICE VISIT (OUTPATIENT)
Dept: PRIMARY CARE CLINIC | Age: 63
End: 2023-11-30
Payer: MEDICARE

## 2023-11-30 VITALS
DIASTOLIC BLOOD PRESSURE: 78 MMHG | BODY MASS INDEX: 53.37 KG/M2 | OXYGEN SATURATION: 96 % | HEART RATE: 114 BPM | WEIGHT: 290 LBS | HEIGHT: 62 IN | SYSTOLIC BLOOD PRESSURE: 132 MMHG

## 2023-11-30 DIAGNOSIS — M17.0 PRIMARY OSTEOARTHRITIS OF BOTH KNEES: ICD-10-CM

## 2023-11-30 DIAGNOSIS — Z00.00 MEDICARE ANNUAL WELLNESS VISIT, SUBSEQUENT: Primary | ICD-10-CM

## 2023-11-30 DIAGNOSIS — Z91.09 ENVIRONMENTAL ALLERGIES: ICD-10-CM

## 2023-11-30 DIAGNOSIS — I10 ESSENTIAL HYPERTENSION: ICD-10-CM

## 2023-11-30 DIAGNOSIS — E78.2 MIXED HYPERLIPIDEMIA: ICD-10-CM

## 2023-11-30 DIAGNOSIS — Z86.73 HISTORY OF CVA (CEREBROVASCULAR ACCIDENT): ICD-10-CM

## 2023-11-30 DIAGNOSIS — Z13.0 SCREENING, ANEMIA, DEFICIENCY, IRON: ICD-10-CM

## 2023-11-30 DIAGNOSIS — Z23 NEED FOR INFLUENZA VACCINATION: ICD-10-CM

## 2023-11-30 DIAGNOSIS — Z12.31 SCREENING MAMMOGRAM FOR BREAST CANCER: ICD-10-CM

## 2023-11-30 DIAGNOSIS — S82.002S CLOSED DISPLACED FRACTURE OF LEFT PATELLA, UNSPECIFIED FRACTURE MORPHOLOGY, SEQUELA: ICD-10-CM

## 2023-11-30 DIAGNOSIS — R42 VERTIGO: ICD-10-CM

## 2023-11-30 LAB
ALBUMIN SERPL-MCNC: 4.2 G/DL (ref 3.5–5.2)
ALBUMIN/GLOB SERPL: 1 {RATIO} (ref 1–2.5)
ALP SERPL-CCNC: 92 U/L (ref 35–104)
ALT SERPL-CCNC: 13 U/L (ref 5–33)
ANION GAP SERPL CALCULATED.3IONS-SCNC: 13 MMOL/L (ref 9–17)
AST SERPL-CCNC: 19 U/L
BASOPHILS # BLD: 0.05 K/UL (ref 0–0.2)
BASOPHILS NFR BLD: 1 % (ref 0–2)
BILIRUB SERPL-MCNC: 0.3 MG/DL (ref 0.3–1.2)
BUN SERPL-MCNC: 17 MG/DL (ref 8–23)
CALCIUM SERPL-MCNC: 9.5 MG/DL (ref 8.6–10.4)
CHLORIDE SERPL-SCNC: 101 MMOL/L (ref 98–107)
CHOLEST SERPL-MCNC: 240 MG/DL
CHOLESTEROL/HDL RATIO: 3.9
CO2 SERPL-SCNC: 25 MMOL/L (ref 20–31)
CREAT SERPL-MCNC: 0.7 MG/DL (ref 0.5–0.9)
EOSINOPHIL # BLD: 0.04 K/UL (ref 0–0.44)
EOSINOPHILS RELATIVE PERCENT: 1 % (ref 1–4)
ERYTHROCYTE [DISTWIDTH] IN BLOOD BY AUTOMATED COUNT: 13.4 % (ref 11.8–14.4)
GFR SERPL CREATININE-BSD FRML MDRD: >60 ML/MIN/1.73M2
GLUCOSE SERPL-MCNC: 87 MG/DL (ref 70–99)
HCT VFR BLD AUTO: 43.5 % (ref 36.3–47.1)
HDLC SERPL-MCNC: 62 MG/DL
HGB BLD-MCNC: 13.6 G/DL (ref 11.9–15.1)
IMM GRANULOCYTES # BLD AUTO: <0.03 K/UL (ref 0–0.3)
IMM GRANULOCYTES NFR BLD: 0 %
LDLC SERPL CALC-MCNC: 161 MG/DL (ref 0–130)
LYMPHOCYTES NFR BLD: 1.66 K/UL (ref 1.1–3.7)
LYMPHOCYTES RELATIVE PERCENT: 34 % (ref 24–43)
MCH RBC QN AUTO: 26.5 PG (ref 25.2–33.5)
MCHC RBC AUTO-ENTMCNC: 31.3 G/DL (ref 28.4–34.8)
MCV RBC AUTO: 84.8 FL (ref 82.6–102.9)
MONOCYTES NFR BLD: 0.36 K/UL (ref 0.1–1.2)
MONOCYTES NFR BLD: 8 % (ref 3–12)
NEUTROPHILS NFR BLD: 56 % (ref 36–65)
NEUTS SEG NFR BLD: 2.7 K/UL (ref 1.5–8.1)
NRBC BLD-RTO: 0 PER 100 WBC
PLATELET # BLD AUTO: 313 K/UL (ref 138–453)
PMV BLD AUTO: 9.6 FL (ref 8.1–13.5)
POTASSIUM SERPL-SCNC: 4.1 MMOL/L (ref 3.7–5.3)
PROT SERPL-MCNC: 8.5 G/DL (ref 6.4–8.3)
RBC # BLD AUTO: 5.13 M/UL (ref 3.95–5.11)
SODIUM SERPL-SCNC: 139 MMOL/L (ref 135–144)
TRIGL SERPL-MCNC: 84 MG/DL
WBC OTHER # BLD: 4.8 K/UL (ref 3.5–11.3)

## 2023-11-30 PROCEDURE — 3075F SYST BP GE 130 - 139MM HG: CPT | Performed by: NURSE PRACTITIONER

## 2023-11-30 PROCEDURE — 3078F DIAST BP <80 MM HG: CPT | Performed by: NURSE PRACTITIONER

## 2023-11-30 PROCEDURE — G8482 FLU IMMUNIZE ORDER/ADMIN: HCPCS | Performed by: NURSE PRACTITIONER

## 2023-11-30 PROCEDURE — G0008 ADMIN INFLUENZA VIRUS VAC: HCPCS | Performed by: NURSE PRACTITIONER

## 2023-11-30 PROCEDURE — 3017F COLORECTAL CA SCREEN DOC REV: CPT | Performed by: NURSE PRACTITIONER

## 2023-11-30 PROCEDURE — G0439 PPPS, SUBSEQ VISIT: HCPCS | Performed by: NURSE PRACTITIONER

## 2023-11-30 PROCEDURE — 90674 CCIIV4 VAC NO PRSV 0.5 ML IM: CPT | Performed by: NURSE PRACTITIONER

## 2023-11-30 RX ORDER — HYDROCHLOROTHIAZIDE 25 MG/1
25 TABLET ORAL DAILY
Qty: 90 TABLET | Refills: 3 | Status: SHIPPED | OUTPATIENT
Start: 2023-11-30

## 2023-11-30 RX ORDER — HYDRALAZINE HYDROCHLORIDE 25 MG/1
TABLET, FILM COATED ORAL
Qty: 180 TABLET | Refills: 3 | Status: SHIPPED | OUTPATIENT
Start: 2023-11-30

## 2023-11-30 RX ORDER — TRAMADOL HYDROCHLORIDE 50 MG/1
50 TABLET ORAL 3 TIMES DAILY PRN
Qty: 30 TABLET | Refills: 0 | Status: SHIPPED | OUTPATIENT
Start: 2023-11-30 | End: 2023-12-30

## 2023-11-30 RX ORDER — MECLIZINE HCL 12.5 MG/1
TABLET ORAL
Qty: 90 TABLET | Refills: 5 | Status: SHIPPED | OUTPATIENT
Start: 2023-11-30

## 2023-11-30 RX ORDER — AMLODIPINE BESYLATE 10 MG/1
10 TABLET ORAL DAILY
Qty: 90 TABLET | Refills: 3 | Status: SHIPPED | OUTPATIENT
Start: 2023-11-30

## 2023-11-30 RX ORDER — CLOPIDOGREL BISULFATE 75 MG/1
75 TABLET ORAL DAILY
Qty: 90 TABLET | Refills: 3 | Status: SHIPPED | OUTPATIENT
Start: 2023-11-30

## 2023-11-30 RX ORDER — MELOXICAM 15 MG/1
15 TABLET ORAL DAILY
Qty: 90 TABLET | Refills: 3 | Status: SHIPPED | OUTPATIENT
Start: 2023-11-30

## 2023-11-30 RX ORDER — LORATADINE 10 MG/1
10 TABLET ORAL DAILY
Qty: 90 TABLET | Refills: 3 | Status: SHIPPED | OUTPATIENT
Start: 2023-11-30

## 2023-11-30 RX ORDER — ATORVASTATIN CALCIUM 20 MG/1
TABLET, FILM COATED ORAL
Qty: 90 TABLET | Refills: 3 | Status: SHIPPED | OUTPATIENT
Start: 2023-11-30

## 2023-11-30 ASSESSMENT — PATIENT HEALTH QUESTIONNAIRE - PHQ9
SUM OF ALL RESPONSES TO PHQ9 QUESTIONS 1 & 2: 1
SUM OF ALL RESPONSES TO PHQ QUESTIONS 1-9: 1
2. FEELING DOWN, DEPRESSED OR HOPELESS: 1
1. LITTLE INTEREST OR PLEASURE IN DOING THINGS: 0
SUM OF ALL RESPONSES TO PHQ QUESTIONS 1-9: 1

## 2023-11-30 NOTE — PROGRESS NOTES
After obtaining consent, and per orders of Genie Etienne CNP, injection of Flu Vaccine given in Left deltoid by Luciana Leyva MA. Patient instructed to remain in clinic for 20 minutes afterwards, and to report any adverse reaction to me immediately.

## 2023-11-30 NOTE — PROGRESS NOTES
Medicare Annual Wellness Visit    Nick Montes is here for Medicare AWV    Assessment & Plan   Medicare annual wellness visit, subsequent-she is due for screening labs and mammogram, chronic conditions are stable. Refills provided on all medications. Her mobility has improved since left knee fracture. She has made modifications to her home and is planning to return there in the next  History of CVA (cerebrovascular accident)  -     atorvastatin (LIPITOR) 20 MG tablet; TAKE 1 TABLET BY MOUTH EVERY DAY, Disp-90 tablet, R-3Patient needs appointment for further refillsNormal  Vertigo  -     meclizine (ANTIVERT) 12.5 MG tablet; TAKE 1 TABLET BY MOUTH THREE TIMES A DAY AS NEEDED, Disp-90 tablet, R-5Normal  Essential hypertension  -     amLODIPine (NORVASC) 10 MG tablet; Take 1 tablet by mouth daily, Disp-90 tablet, R-3DX Code Needed  . Normal  -     hydroCHLOROthiazide (HYDRODIURIL) 25 MG tablet; Take 1 tablet by mouth daily, Disp-90 tablet, R-3Normal  -     hydrALAZINE (APRESOLINE) 25 MG tablet; TAKE 1 TABLET BY MOUTH TWICE A DAY, Disp-180 tablet, R-3Normal  -     Comprehensive Metabolic Panel; Future  -     Lipid Panel; Future  Primary osteoarthritis of both knees  -     traMADol (ULTRAM) 50 MG tablet; Take 1 tablet by mouth 3 times daily as needed for Pain for up to 30 days. , Disp-30 tablet, R-0Normal  Need for influenza vaccination  -     Influenza, FLUCELVAX, (age 10 mo+), IM, Preservative Free, 0.5 mL  Screening, anemia, deficiency, iron  -     CBC with Auto Differential; Future  Screening mammogram for breast cancer  -     [facility] DIGITAL SCREEN W OR WO CAD BILATERAL; Future  Environmental allergies  -     loratadine (CLARITIN) 10 MG tablet; Take 1 tablet by mouth daily, Disp-90 tablet, R-3Normal  Closed displaced fracture of left patella, unspecified fracture morphology, sequela  -     meloxicam (MOBIC) 15 MG tablet;  Take 1 tablet by mouth daily, Disp-90 tablet, R-3Normal  Mixed hyperlipidemia  -     Comprehensive

## 2023-12-04 ENCOUNTER — TELEPHONE (OUTPATIENT)
Dept: PRIMARY CARE CLINIC | Age: 63
End: 2023-12-04

## 2023-12-04 NOTE — TELEPHONE ENCOUNTER
Patient called in stating she went to  her prescriptions today and the pharmacist flagged her Loratadine and Meclizine stating they were concerned about her taking them together. Patient states \"it had big black letters flagging it stating I can't take them together\". Patient would like to know what she should do about this.      Please advise

## 2023-12-04 NOTE — TELEPHONE ENCOUNTER
The warning is because they are both antihistamines. However, they are used for different things. It should be fine for her to use both of these medications.   I suggest she take her Claritin daily in the morning and use the Antivert as needed only for vertigo

## 2024-09-30 DIAGNOSIS — Z86.73 HISTORY OF CVA (CEREBROVASCULAR ACCIDENT): ICD-10-CM

## 2024-09-30 RX ORDER — ATORVASTATIN CALCIUM 20 MG/1
TABLET, FILM COATED ORAL
Qty: 90 TABLET | Refills: 3 | Status: SHIPPED | OUTPATIENT
Start: 2024-09-30

## 2024-09-30 RX ORDER — CLOPIDOGREL BISULFATE 75 MG/1
75 TABLET ORAL DAILY
Qty: 90 TABLET | Refills: 3 | Status: SHIPPED | OUTPATIENT
Start: 2024-09-30

## 2024-12-04 ENCOUNTER — TELEPHONE (OUTPATIENT)
Dept: PRIMARY CARE CLINIC | Age: 64
End: 2024-12-04

## 2024-12-04 NOTE — TELEPHONE ENCOUNTER
----- Message from Raphael ROLDAN sent at 12/4/2024  8:53 AM EST -----  Regarding: [facility] Appointment Request  [facility] Appointment Request    Patient needs appointment for [facility] Appointment Type: Annual Visit.    Patient Requested Dates(s): Dec. 19 , 2024  Patient Requested Time: 8:30 am  Provider Name:    Marlee Duron APRN - [facility]        Reason for Appointment Request: Established Patient - No appointments available during search  --------------------------------------------------------------------------------------------------------------------------    Relationship to Patient: Self     Call Back Information: OK to leave message on voicemail  Preferred Call Back Number: Phone [phone]

## 2024-12-18 SDOH — HEALTH STABILITY: PHYSICAL HEALTH
ON AVERAGE, HOW MANY DAYS PER WEEK DO YOU ENGAGE IN MODERATE TO STRENUOUS EXERCISE (LIKE A BRISK WALK)?: PATIENT DECLINED

## 2024-12-18 SDOH — HEALTH STABILITY: PHYSICAL HEALTH: ON AVERAGE, HOW MANY MINUTES DO YOU ENGAGE IN EXERCISE AT THIS LEVEL?: 30 MIN

## 2024-12-18 ASSESSMENT — PATIENT HEALTH QUESTIONNAIRE - PHQ9
8. MOVING OR SPEAKING SO SLOWLY THAT OTHER PEOPLE COULD HAVE NOTICED. OR THE OPPOSITE, BEING SO FIGETY OR RESTLESS THAT YOU HAVE BEEN MOVING AROUND A LOT MORE THAN USUAL: NOT AT ALL
SUM OF ALL RESPONSES TO PHQ QUESTIONS 1-9: 9
6. FEELING BAD ABOUT YOURSELF - OR THAT YOU ARE A FAILURE OR HAVE LET YOURSELF OR YOUR FAMILY DOWN: NOT AT ALL
9. THOUGHTS THAT YOU WOULD BE BETTER OFF DEAD, OR OF HURTING YOURSELF: NOT AT ALL
5. POOR APPETITE OR OVEREATING: NOT AT ALL
2. FEELING DOWN, DEPRESSED OR HOPELESS: MORE THAN HALF THE DAYS
7. TROUBLE CONCENTRATING ON THINGS, SUCH AS READING THE NEWSPAPER OR WATCHING TELEVISION: NOT AT ALL
3. TROUBLE FALLING OR STAYING ASLEEP: MORE THAN HALF THE DAYS
1. LITTLE INTEREST OR PLEASURE IN DOING THINGS: NEARLY EVERY DAY
4. FEELING TIRED OR HAVING LITTLE ENERGY: MORE THAN HALF THE DAYS
SUM OF ALL RESPONSES TO PHQ QUESTIONS 1-9: 9
SUM OF ALL RESPONSES TO PHQ QUESTIONS 1-9: 9
10. IF YOU CHECKED OFF ANY PROBLEMS, HOW DIFFICULT HAVE THESE PROBLEMS MADE IT FOR YOU TO DO YOUR WORK, TAKE CARE OF THINGS AT HOME, OR GET ALONG WITH OTHER PEOPLE: NOT DIFFICULT AT ALL
SUM OF ALL RESPONSES TO PHQ QUESTIONS 1-9: 9
SUM OF ALL RESPONSES TO PHQ9 QUESTIONS 1 & 2: 5

## 2024-12-18 ASSESSMENT — LIFESTYLE VARIABLES
HOW OFTEN DO YOU HAVE SIX OR MORE DRINKS ON ONE OCCASION: 1
HOW OFTEN DO YOU HAVE A DRINK CONTAINING ALCOHOL: NEVER
HOW MANY STANDARD DRINKS CONTAINING ALCOHOL DO YOU HAVE ON A TYPICAL DAY: 0
HOW OFTEN DO YOU HAVE A DRINK CONTAINING ALCOHOL: 1
HOW MANY STANDARD DRINKS CONTAINING ALCOHOL DO YOU HAVE ON A TYPICAL DAY: PATIENT DOES NOT DRINK

## 2024-12-19 ENCOUNTER — OFFICE VISIT (OUTPATIENT)
Dept: PRIMARY CARE CLINIC | Age: 64
End: 2024-12-19
Payer: MEDICARE

## 2024-12-19 ENCOUNTER — HOSPITAL ENCOUNTER (OUTPATIENT)
Age: 64
Setting detail: SPECIMEN
Discharge: HOME | End: 2024-12-19

## 2024-12-19 VITALS
BODY MASS INDEX: 51.38 KG/M2 | HEART RATE: 116 BPM | HEIGHT: 62 IN | WEIGHT: 279.2 LBS | DIASTOLIC BLOOD PRESSURE: 84 MMHG | SYSTOLIC BLOOD PRESSURE: 138 MMHG | OXYGEN SATURATION: 98 %

## 2024-12-19 DIAGNOSIS — R42 VERTIGO: ICD-10-CM

## 2024-12-19 DIAGNOSIS — M17.0 PRIMARY OSTEOARTHRITIS OF BOTH KNEES: ICD-10-CM

## 2024-12-19 DIAGNOSIS — Z23 FLU VACCINE NEED: ICD-10-CM

## 2024-12-19 DIAGNOSIS — Z12.31 SCREENING MAMMOGRAM FOR BREAST CANCER: ICD-10-CM

## 2024-12-19 DIAGNOSIS — Z13.0 SCREENING, ANEMIA, DEFICIENCY, IRON: ICD-10-CM

## 2024-12-19 DIAGNOSIS — I10 ESSENTIAL HYPERTENSION: ICD-10-CM

## 2024-12-19 DIAGNOSIS — Z86.73 HISTORY OF CVA (CEREBROVASCULAR ACCIDENT): ICD-10-CM

## 2024-12-19 DIAGNOSIS — E78.2 MIXED HYPERLIPIDEMIA: ICD-10-CM

## 2024-12-19 DIAGNOSIS — Z00.00 MEDICARE ANNUAL WELLNESS VISIT, SUBSEQUENT: Primary | ICD-10-CM

## 2024-12-19 DIAGNOSIS — Z23 NEED FOR PNEUMOCOCCAL VACCINATION: ICD-10-CM

## 2024-12-19 PROBLEM — T14.8XXA AVULSION FRACTURE: Status: RESOLVED | Noted: 2022-12-10 | Resolved: 2024-12-19

## 2024-12-19 LAB
ALBUMIN SERPL-MCNC: 4.8 G/DL (ref 3.5–5.2)
ALBUMIN/GLOB SERPL: 1.3 {RATIO} (ref 1–2.5)
ALP SERPL-CCNC: 82 U/L (ref 35–104)
ALT SERPL-CCNC: 12 U/L (ref 10–35)
ANION GAP SERPL CALCULATED.3IONS-SCNC: 14 MMOL/L (ref 9–16)
AST SERPL-CCNC: 25 U/L (ref 10–35)
BASOPHILS # BLD: 0.06 K/UL (ref 0–0.2)
BASOPHILS NFR BLD: 1 % (ref 0–2)
BILIRUB SERPL-MCNC: 0.3 MG/DL (ref 0–1.2)
BUN SERPL-MCNC: 18 MG/DL (ref 8–23)
CALCIUM SERPL-MCNC: 10.1 MG/DL (ref 8.6–10.4)
CHLORIDE SERPL-SCNC: 104 MMOL/L (ref 98–107)
CHOLEST SERPL-MCNC: 247 MG/DL (ref 0–199)
CHOLESTEROL/HDL RATIO: 3.8
CO2 SERPL-SCNC: 23 MMOL/L (ref 20–31)
CREAT SERPL-MCNC: 0.9 MG/DL (ref 0.6–0.9)
EOSINOPHIL # BLD: <0.03 K/UL (ref 0–0.44)
EOSINOPHILS RELATIVE PERCENT: 0 % (ref 1–4)
ERYTHROCYTE [DISTWIDTH] IN BLOOD BY AUTOMATED COUNT: 13.2 % (ref 11.8–14.4)
GFR, ESTIMATED: 71 ML/MIN/1.73M2
GLUCOSE SERPL-MCNC: 92 MG/DL (ref 74–99)
HCT VFR BLD AUTO: 43.9 % (ref 36.3–47.1)
HDLC SERPL-MCNC: 65 MG/DL
HGB BLD-MCNC: 13.6 G/DL (ref 11.9–15.1)
IMM GRANULOCYTES # BLD AUTO: 0.07 K/UL (ref 0–0.3)
IMM GRANULOCYTES NFR BLD: 1 %
LDLC SERPL CALC-MCNC: 170 MG/DL (ref 0–100)
LYMPHOCYTES NFR BLD: 1.95 K/UL (ref 1.1–3.7)
LYMPHOCYTES RELATIVE PERCENT: 27 % (ref 24–43)
MCH RBC QN AUTO: 26.9 PG (ref 25.2–33.5)
MCHC RBC AUTO-ENTMCNC: 31 G/DL (ref 28.4–34.8)
MCV RBC AUTO: 86.8 FL (ref 82.6–102.9)
MONOCYTES NFR BLD: 0.46 K/UL (ref 0.1–1.2)
MONOCYTES NFR BLD: 6 % (ref 3–12)
NEUTROPHILS NFR BLD: 65 % (ref 36–65)
NEUTS SEG NFR BLD: 4.77 K/UL (ref 1.5–8.1)
NRBC BLD-RTO: 0 PER 100 WBC
PLATELET # BLD AUTO: 332 K/UL (ref 138–453)
PMV BLD AUTO: 10.1 FL (ref 8.1–13.5)
POTASSIUM SERPL-SCNC: 4 MMOL/L (ref 3.7–5.3)
PROT SERPL-MCNC: 8.5 G/DL (ref 6.6–8.7)
RBC # BLD AUTO: 5.06 M/UL (ref 3.95–5.11)
SODIUM SERPL-SCNC: 141 MMOL/L (ref 136–145)
TRIGL SERPL-MCNC: 60 MG/DL
VLDLC SERPL CALC-MCNC: 12 MG/DL (ref 1–30)
WBC OTHER # BLD: 7.3 K/UL (ref 3.5–11.3)

## 2024-12-19 PROCEDURE — G0008 ADMIN INFLUENZA VIRUS VAC: HCPCS | Performed by: NURSE PRACTITIONER

## 2024-12-19 PROCEDURE — 90653 IIV ADJUVANT VACCINE IM: CPT | Performed by: NURSE PRACTITIONER

## 2024-12-19 PROCEDURE — G0009 ADMIN PNEUMOCOCCAL VACCINE: HCPCS | Performed by: NURSE PRACTITIONER

## 2024-12-19 PROCEDURE — G8482 FLU IMMUNIZE ORDER/ADMIN: HCPCS | Performed by: NURSE PRACTITIONER

## 2024-12-19 PROCEDURE — 3075F SYST BP GE 130 - 139MM HG: CPT | Performed by: NURSE PRACTITIONER

## 2024-12-19 PROCEDURE — 1123F ACP DISCUSS/DSCN MKR DOCD: CPT | Performed by: NURSE PRACTITIONER

## 2024-12-19 PROCEDURE — 90677 PCV20 VACCINE IM: CPT | Performed by: NURSE PRACTITIONER

## 2024-12-19 PROCEDURE — 3079F DIAST BP 80-89 MM HG: CPT | Performed by: NURSE PRACTITIONER

## 2024-12-19 PROCEDURE — 3017F COLORECTAL CA SCREEN DOC REV: CPT | Performed by: NURSE PRACTITIONER

## 2024-12-19 PROCEDURE — G0439 PPPS, SUBSEQ VISIT: HCPCS | Performed by: NURSE PRACTITIONER

## 2024-12-19 RX ORDER — TRAMADOL HYDROCHLORIDE 50 MG/1
50 TABLET ORAL EVERY 8 HOURS PRN
Qty: 21 TABLET | Refills: 0 | Status: SHIPPED | OUTPATIENT
Start: 2024-12-19 | End: 2025-01-18

## 2024-12-19 RX ORDER — MECLIZINE HCL 12.5 MG 12.5 MG/1
TABLET ORAL
Qty: 90 TABLET | Refills: 5 | Status: SHIPPED | OUTPATIENT
Start: 2024-12-19

## 2024-12-19 SDOH — ECONOMIC STABILITY: FOOD INSECURITY: WITHIN THE PAST 12 MONTHS, YOU WORRIED THAT YOUR FOOD WOULD RUN OUT BEFORE YOU GOT MONEY TO BUY MORE.: NEVER TRUE

## 2024-12-19 SDOH — ECONOMIC STABILITY: FOOD INSECURITY: WITHIN THE PAST 12 MONTHS, THE FOOD YOU BOUGHT JUST DIDN'T LAST AND YOU DIDN'T HAVE MONEY TO GET MORE.: NEVER TRUE

## 2024-12-19 SDOH — ECONOMIC STABILITY: INCOME INSECURITY: HOW HARD IS IT FOR YOU TO PAY FOR THE VERY BASICS LIKE FOOD, HOUSING, MEDICAL CARE, AND HEATING?: NOT HARD AT ALL

## 2024-12-19 NOTE — PROGRESS NOTES
Medicare Annual Wellness Visit    Kelle Marques is here for Medicare AWV, Other (Address Care Gaps), and Insomnia    Assessment & Plan   Medicare annual wellness visit, subsequent  Vertigo episodes are infrequently mainly flare up with times of increased sinus congestion or allergies.  -     meclizine (ANTIVERT) 12.5 MG tablet; TAKE 1 TABLET BY MOUTH THREE TIMES A DAY AS NEEDED, Disp-90 tablet, R-5Normal  Primary osteoarthritis of both knees-generally stable, she has increased mobility and activity since her last visit 1 year ago.  She is back living independently now.  Refill on tramadol for episodes of severe pain/use at at bedtime.  Last refill was 1 year ago, PDMP reflects appropriate use of medication  -     traMADol (ULTRAM) 50 MG tablet; Take 1 tablet by mouth every 8 hours as needed for Pain for up to 30 days. Max Daily Amount: 150 mg, Disp-21 tablet, R-0Normal  Screening mammogram for breast cancer  -     [facility] DIGITAL SCREEN W OR WO CAD BILATERAL; Future  Essential hypertension-well-controlled on amlodipine, carvedilol, hydralazine and hydrochlorothiazide  -     Comprehensive Metabolic Panel; Future  -     Lipid Panel; Future  Screening, anemia, deficiency, iron  -     CBC with Auto Differential; Future  History of CVA (cerebrovascular accident)-has been stable with no recurrence, she is compliant with Plavix  Mixed hyperlipidemia-has been stable on statin  Flu vaccine need  -     Influenza, FLUAD Trivalent, (age 65 y+), IM, Preservative Free, 0.5mL  Need for pneumococcal vaccination  -     Pneumococcal, PCV20, PREVNAR 20, (age 6w+), IM, PF    Recommendations for Preventive Services Due: see orders and patient instructions/AVS.  Recommended screening schedule for the next 5-10 years is provided to the patient in written form: see Patient Instructions/AVS.     Return in about 1 year (around 12/19/2025) for AWV.     Subjective   The following acute and/or chronic problems were also addressed today:  Has been

## 2024-12-19 NOTE — PATIENT INSTRUCTIONS
device in the bathroom with you.   Where can you learn more?  Go to [email or web address]/patientEd and enter G117 to learn more about \"Preventing Falls: Care Instructions.\"  Current as of: July 17, 2023  Content Version: 14.2  © 2024 [employer].   Care instructions adapted under license by [employer]. If you have questions about a medical condition or this instruction, always ask your healthcare professional. [employer] disclaims any warranty or liability for your use of this information.           Learning About Mindfulness for Stress  What are mindfulness and stress?     Stress is your body's response to a hard situation. Your body can have a physical, emotional, or mental response. A lot of things can cause stress. You may feel stress when you go on a job interview, take a test, or run a race. This kind of short-term stress is normal and even useful. It can help you if you need to work hard or react quickly.  Stress also can last a long time. Long-term stress is caused by stressful situations or events. Examples of long-term stress include long-term health problems, ongoing problems at work, and conflicts in your family. Long-term stress can harm your health.  Mindfulness is a focus only on things happening in the present moment. It's a process of purposefully paying attention to and being aware of your surroundings, your emotions, your thoughts, and how your body feels. You are aware of these things, but you aren't judging these experiences as \"good\" or \"bad.\" Mindfulness can help you learn to calm your mind and body to help you cope with illness, pain, and stress.  How does mindfulness help to relieve stress?  Mindfulness can help quiet your mind and relax your body. Studies show that it can help some people sleep better, feel less anxious, and bring their blood pressure down. And it's been shown to help some people live and cope better with certain health problems like heart

## 2024-12-25 DIAGNOSIS — S82.002S CLOSED DISPLACED FRACTURE OF LEFT PATELLA, UNSPECIFIED FRACTURE MORPHOLOGY, SEQUELA: ICD-10-CM

## 2024-12-25 DIAGNOSIS — I10 ESSENTIAL HYPERTENSION: ICD-10-CM

## 2024-12-26 RX ORDER — MELOXICAM 15 MG/1
15 TABLET ORAL DAILY
Qty: 90 TABLET | Refills: 3 | Status: SHIPPED | OUTPATIENT
Start: 2024-12-26

## 2024-12-26 RX ORDER — AMLODIPINE BESYLATE 10 MG/1
10 TABLET ORAL DAILY
Qty: 90 TABLET | Refills: 3 | Status: SHIPPED | OUTPATIENT
Start: 2024-12-26

## 2025-01-16 DIAGNOSIS — I10 ESSENTIAL HYPERTENSION: ICD-10-CM

## 2025-01-16 RX ORDER — HYDROCHLOROTHIAZIDE 25 MG/1
25 TABLET ORAL DAILY
Qty: 90 TABLET | Refills: 3 | Status: SHIPPED | OUTPATIENT
Start: 2025-01-16

## 2025-02-08 DIAGNOSIS — I10 ESSENTIAL HYPERTENSION: ICD-10-CM

## 2025-02-10 RX ORDER — HYDRALAZINE HYDROCHLORIDE 25 MG/1
TABLET, FILM COATED ORAL
Qty: 180 TABLET | Refills: 3 | Status: SHIPPED | OUTPATIENT
Start: 2025-02-10

## (undated) DEVICE — DUAL HOSE W/CPC CONNECTORS: Brand: A.T.S.® TOURNIQUET SYSTEM

## (undated) DEVICE — GOWN,SIRUS,NONRNF,SETINSLV,XL,20/CS: Brand: MEDLINE

## (undated) DEVICE — DRAPE,U/ SHT,SPLIT,PLAS,STERIL: Brand: MEDLINE

## (undated) DEVICE — ELECTRODE PT RET AD L9FT HI MOIST COND ADH HYDRGEL CORDED

## (undated) DEVICE — GLOVE SURG SZ 65 THK91MIL LTX FREE SYN POLYISOPRENE

## (undated) DEVICE — BNDG,ELSTC,MATRIX,STRL,6"X5YD,LF,HOOK&LP: Brand: MEDLINE

## (undated) DEVICE — FOAM BUMP, LARGE: Brand: MEDLINE INDUSTRIES, INC.

## (undated) DEVICE — C-ARMOR C-ARM EQUIPMENT COVERS CLEAR STERILE UNIVERSAL FIT 12 PER CASE: Brand: C-ARMOR

## (undated) DEVICE — ZIMMER® STERILE DISPOSABLE TOURNIQUET CUFF WITH PROTECTIVE SLEEVE AND PLC, DUAL PORT, SINGLE BLADDER, 42 IN. (107 CM)

## (undated) DEVICE — 1016 S-DRAPE IRRIG POUCH 10/BOX: Brand: STERI-DRAPE™

## (undated) DEVICE — SUTURE FIBERWIRE SZ 5 L38IN NONABSORBABLE BLU L48MM 1/2 AR7211

## (undated) DEVICE — INTENDED FOR TISSUE SEPARATION, AND OTHER PROCEDURES THAT REQUIRE A SHARP SURGICAL BLADE TO PUNCTURE OR CUT.: Brand: BARD-PARKER ® CARBON RIB-BACK BLADES

## (undated) DEVICE — SUTURE ABSORBABLE BRAIDED 2-0 CT-1 27 IN UD VICRYL J259H

## (undated) DEVICE — SOLUTION SCRB 4OZ 4% CHG H2O AIDED FOR PREOPERATIVE SKIN

## (undated) DEVICE — CYSTO/BLADDER IRRIGATION SET, REGULATING CLAMP

## (undated) DEVICE — GAUZE,SPONGE,FLUFF,6"X6.75",STRL,5/TRAY: Brand: MEDLINE

## (undated) DEVICE — GARMENT,MEDLINE,DVT,INT,CALF,MED, GEN2: Brand: MEDLINE

## (undated) DEVICE — SUTURE N ABSRB BRAIDED 2 MMX17 IN 265 MM BLU FIBERWIRE AR723717N

## (undated) DEVICE — GLOVE ORANGE PI 7   MSG9070

## (undated) DEVICE — SPLINT ORTH 22IN KNEE BASIC

## (undated) DEVICE — BANDAGE COMPR W6INXL15YD WHT BGE POLY COT WV E HK LOOP CLSR

## (undated) DEVICE — C-ARM: Brand: UNBRANDED

## (undated) DEVICE — APPLICATOR MEDICATED 26 CC SOLUTION HI LT ORNG CHLORAPREP

## (undated) DEVICE — DRAPE,REIN 53X77,STERILE: Brand: MEDLINE

## (undated) DEVICE — DRESSING,GAUZE,XEROFORM,CURAD,1"X8",ST: Brand: CURAD

## (undated) DEVICE — SUTURE VCRL SZ 0 L27IN ABSRB UD L36MM CT-1 1/2 CIR J260H

## (undated) DEVICE — PADDING CAST W6INXL4YD COT LO LINTING WYTEX

## (undated) DEVICE — SURGICAL SUCTION CONNECTING TUBE WITH MALE CONNECTOR AND SUCTION CLAMP, 2 FT. LONG (.6 M), 5 MM I.D.: Brand: CONMED

## (undated) DEVICE — THE STERILE LIGHT HANDLE COVER IS USED WITH STERIS SURGICAL LIGHTING AND VISUALIZATION SYSTEMS.

## (undated) DEVICE — GOWN,AURORA,NONREINFORCED,LARGE: Brand: MEDLINE

## (undated) DEVICE — PADDING,UNDERCAST,COTTON, 4"X4YD STERILE: Brand: MEDLINE

## (undated) DEVICE — GLOVE ORANGE PI 7 1/2   MSG9075

## (undated) DEVICE — SUTURE MCRYL SZ 3-0 L27IN ABSRB UD L24MM PS-1 3/8 CIR PRIM Y936H

## (undated) DEVICE — SUTURE MCRYL SZ 2-0 L27IN ABSRB UD SH L26MM TAPERPOINT NDL Y417H

## (undated) DEVICE — ZIMMER® STERILE DISPOSABLE TOURNIQUET CUFF WITH PROTECTIVE SLEEVE AND PLC, DUAL PORT, SINGLE BLADDER, 34 IN. (86 CM)

## (undated) DEVICE — SVMMC ORTH SPL DRP PK

## (undated) DEVICE — BANDAGE COBAN 6 IN WND 6INX5YD FOAM